# Patient Record
Sex: MALE | Race: WHITE | NOT HISPANIC OR LATINO | Employment: OTHER | URBAN - METROPOLITAN AREA
[De-identification: names, ages, dates, MRNs, and addresses within clinical notes are randomized per-mention and may not be internally consistent; named-entity substitution may affect disease eponyms.]

---

## 2017-01-02 ENCOUNTER — OFFICE VISIT (OUTPATIENT)
Dept: INTERNAL MEDICINE | Facility: CLINIC | Age: 67
End: 2017-01-02
Payer: COMMERCIAL

## 2017-01-02 VITALS
DIASTOLIC BLOOD PRESSURE: 66 MMHG | BODY MASS INDEX: 29.86 KG/M2 | OXYGEN SATURATION: 100 % | SYSTOLIC BLOOD PRESSURE: 118 MMHG | HEART RATE: 74 BPM | WEIGHT: 214 LBS | RESPIRATION RATE: 16 BRPM | TEMPERATURE: 97.3 F

## 2017-01-02 DIAGNOSIS — E78.5 HYPERLIPIDEMIA LDL GOAL <130: ICD-10-CM

## 2017-01-02 DIAGNOSIS — I67.89 ACUTE, BUT ILL-DEFINED, CEREBROVASCULAR DISEASE: Primary | ICD-10-CM

## 2017-01-02 PROCEDURE — 99214 OFFICE O/P EST MOD 30 MIN: CPT | Performed by: INTERNAL MEDICINE

## 2017-01-02 RX ORDER — ATORVASTATIN CALCIUM 40 MG/1
80 TABLET, FILM COATED ORAL DAILY
Qty: 180 TABLET | Refills: 3 | Status: SHIPPED | OUTPATIENT
Start: 2017-01-02 | End: 2018-03-23

## 2017-01-02 RX ORDER — ASPIRIN 325 MG
162 TABLET, DELAYED RELEASE (ENTERIC COATED) ORAL DAILY
COMMUNITY
End: 2018-03-23

## 2017-01-02 NOTE — NURSING NOTE
"Chief Complaint   Patient presents with     ER F/U       Initial /66 mmHg  Pulse 74  Temp(Src) 97.3  F (36.3  C) (Temporal)  Resp 16  Wt 214 lb (97.07 kg)  SpO2 100% Estimated body mass index is 29.86 kg/(m^2) as calculated from the following:    Height as of 12/28/16: 5' 11\" (1.803 m).    Weight as of this encounter: 214 lb (97.07 kg).  BP completed using cuff size: karolina Davis MA    "

## 2017-01-02 NOTE — MR AVS SNAPSHOT
After Visit Summary   1/2/2017    Dylon Barragan    MRN: [id number]           Patient Information     Date Of Birth          1950        Visit Information        Provider Department      1/2/2017 10:30 AM Orville Galeas MD [facility]        Today's Diagnoses     Acute, but ill-defined, cerebrovascular disease    -  1     Hyperlipidemia LDL goal <130            Follow-ups after your visit        Who to contact     If you have questions or need follow up information about today's clinic visit or your schedule please contact [facility] directly at [phone].  Normal or non-critical lab and imaging results will be communicated to you by T[employer]or Made Oilhart, letter or phone within 4 business days after the clinic has received the results. If you do not hear from us within 7 days, please contact the clinic through Totsyt or phone. If you have a critical or abnormal lab result, we will notify you by phone as soon as possible.  Submit refill requests through Unafinance or call your pharmacy and they will forward the refill request to us. Please allow 3 business days for your refill to be completed.          Additional Information About Your Visit        MyChart Information     Unafinance gives you secure access to your electronic health record. If you see a primary care provider, you can also send messages to your care team and make appointments. If you have questions, please call your primary care clinic.  If you do not have a primary care provider, please call [phone] and they will assist you.        Care EveryWhere ID     This is your Care EveryWhere ID. This could be used by other organizations to access your [place] medical records  [id number]        Your Vitals Were     Pulse Temperature Respirations Pulse Oximetry          74 97.3  F (36.3  C) (Temporal) 16 100%         Blood Pressure from Last 3 Encounters:   01/02/17 118/66   12/28/16 117/57   12/02/16 110/61     Weight from Last 3 Encounters:   01/02/17 214 lb (97.07 kg)   12/28/16 210 lb 1.6 oz (95.3 kg)   12/02/16 210 lb (95.255 kg)              Today, you had the following     No orders found for display         Today's Medication Changes          These changes are accurate as of: 1/2/17 11:59 PM.  If you have any questions, ask your nurse or doctor.               These medicines have changed or have updated prescriptions.        Dose/Directions    aspirin 325 MG EC tablet   This may have changed:  Another medication with the same name was removed. Continue taking this medication, and follow the directions you see here.   Changed by:  Orville Galeas MD        Dose:  325 mg   Take 325 mg by mouth daily   Refills:  0       atorvastatin 40 MG tablet   Commonly known as:  LIPITOR   This may have changed:    - how much to take  - how to take this  - when to take this   Used for:  Hyperlipidemia LDL goal <130   Changed by:  Orville Galeas MD        Dose:  80 mg   Take 2 tablets (80 mg) by mouth daily TAKE 1 TABLET BY MOUTH DAILY   Quantity:  180 tablet   Refills:  3            Where to get your medicines      These medications were sent to [address]   [address] [facility] [address]     Phone:  [phone]    - atorvastatin 40 MG tablet             Primary Care Provider Office Phone # Fax #    Orville Galeas [phone]       [address]   [facility] [address]        Thank you!     Thank you for choosing [facility]  for your care. Our goal is always to provide you with excellent care. Hearing back from our patients is one way we can continue to improve our services. Please take a few minutes to complete the written survey that you may receive in the mail after your visit with us. Thank you!             Your Updated Medication List - Protect others around you: Learn how to safely use, store and throw away  your medicines at www.disposemymeds.org.          This list is accurate as of: 1/2/17 11:59 PM.  Always use your most recent med list.                   Brand Name Dispense Instructions for use    aspirin 325 MG EC tablet      Take 325 mg by mouth daily       atorvastatin 40 MG tablet    LIPITOR    180 tablet    Take 2 tablets (80 mg) by mouth daily TAKE 1 TABLET BY MOUTH DAILY       clonazePAM 1 MG tablet    klonoPIN    30 tablet    Take 1 tablet (1 mg) by mouth daily       * COMPRESSION STOCKINGS     1 each    1 each daily.       fexofenadine 180 MG tablet    ALLEGRA    30 tablet    Take 1 tablet (180 mg) by mouth daily as needed       gabapentin 100 MG capsule    NEURONTIN     Take 100 mg by mouth daily       indapamide 1.25 MG tablet    LOZOL    90 tablet    Take 1 tablet (1.25 mg) by mouth every morning       ketoconazole 2 % cream    NIZORAL    60 g    APPLY TO AFFECTED AREA  on ear, face and handsBID       * other medical supplies      lotion for his hands       PARoxetine 12.5 MG 24 hr tablet    PAXIL-CR    90 tablet    TAKE ONE TABLET BY MOUTH EVERY MORNING       propranolol 20 MG tablet    INDERAL    180 tablet    Take 1 tablet (20 mg) by mouth 2 times daily       triamcinolone 0.5 % cream    KENALOG    45 g    Apply topically 2 times daily Apply 0.25 inches topically.       VENTOLIN  (90 BASE) MCG/ACT Inhaler   Generic drug:  albuterol     18 g    INHALE 2 PUFFS BY MOUTH EVERY 6 HOURS AS NEEDED FOR SHORTNESS OF BREATH / DYSPNEA       * Notice:  This list has 2 medication(s) that are the same as other medications prescribed for you. Read the directions carefully, and ask your doctor or other care provider to review them with you.

## 2017-01-02 NOTE — PROGRESS NOTES
SUBJECTIVE:                                                    Dylon Barragan is a 66 year old male who presents to clinic today for the following health issues:      ED/UC Followup:    Facility:  [facility]  Date of visit: 12/28/16  Reason for visit: TIA  Current Status: follow up     On the 28th, his wife notice a right side droop, tongue deviated as well.  Maybe some left eyelid hanging down.  Improved by the ER, maybe a few hours.      Elie lee had dizzy spell for a minute or so, then got better sitting in the car.  More balance issues, no syncope feelings. Then felt fine the next day.     Since the 28th has been more tired.      Patient is feeling better. No issues with speech, vision, facial droop.    Past Medical History   Diagnosis Date     Personal history of unspecified circulatory disease      Acute, but ill-defined, cerebrovascular disease 7/99     right brainstem with right hemiparesis and dysphagia left eye     Essential hypertension, benign      Mixed hyperlipidemia      Edema      Ventral hernia, unspecified, without mention of obstruction or gangrene      Other psoriasis      Sleep apnea      CPAP     Unspecified cerebral artery occlusion with cerebral infarction      1999     Dysphagia      Complication of anesthesia      half an airway due to stroke hx     Current Outpatient Prescriptions   Medication     aspirin 325 MG EC tablet     atorvastatin (LIPITOR) 40 MG tablet     fexofenadine (ALLEGRA) 180 MG tablet     PARoxetine (PAXIL-CR) 12.5 MG 24 hr tablet     propranolol (INDERAL) 20 MG tablet     indapamide (LOZOL) 1.25 MG tablet     triamcinolone (KENALOG) 0.5 % cream     clonazePAM (KLONOPIN) 1 MG tablet     VENTOLIN  (90 BASE) MCG/ACT inhaler     gabapentin (NEURONTIN) 100 MG capsule     ketoconazole (NIZORAL) 2 % cream     COMPRESSION STOCKINGS     OTHER MEDICAL SUPPLIES     No current facility-administered medications for this visit.       Review of  Systems  Constitutional-No fevers, chills, or weight changes..  Eyes-No blurry or double vision.  ENT-No earpain, sore throat, voice changes or rhinitis.  Cardiac-No chest pain or palpitations.  Respiratory-No cough, sob, or hemoptysis.  GI-No nausea, vomitting, diarrhea, constipation, or blood in the stool.    Physical Exam  /66 mmHg  Pulse 74  Temp(Src) 97.3  F (36.3  C) (Temporal)  Resp 16  Wt 214 lb (97.07 kg)  SpO2 100%  General Appearance-healthy, alert, no distress  Eyes-conjuctiva clear, PERRL, EOM intact  ENT-ENT exam normal, no neck nodes or sinus tenderness  Cardiac-regular rate and rhythm  with normal S1, S2 ; no murmur, rub or gallops  Lungs-clear to auscultation    ASSESSMENT:  Patient has had a stroke back in 2006. He now had a TIA. He was having some right-sided issues with his cheek and tongue. He was evaluated in the emergency room and slightly elevated blood pressure, negative head CT, negative MRI other than his old lesion from 2006 and a clear MRA of his head and neck. His symptoms resolved within 2 hours. He has felt tired since then. His blood pressure is well controlled. He is on atorvastatin but will increase this from 40-80 mg. He has said his aspirin increased from . He'll follow up as regularly scheduled with his neurologist. He is aware that his increased risk for the next 1-2 months to have another TIA or stroke and therefore will have increased aspirin and statin therapy he will avoid indulging on high cholesterol foods and stay near medical facilities.    Electronically signed by Orville Galeas MD

## 2017-02-03 ENCOUNTER — TRANSFERRED RECORDS (OUTPATIENT)
Dept: HEALTH INFORMATION MANAGEMENT | Facility: CLINIC | Age: 67
End: 2017-02-03

## 2017-08-23 ENCOUNTER — TELEPHONE (OUTPATIENT)
Dept: INTERNAL MEDICINE | Facility: CLINIC | Age: 67
End: 2017-08-23

## 2017-08-23 NOTE — TELEPHONE ENCOUNTER
Do you have any recommendations or should pt contact the travel clinic? Ani Morgan CMA ([facility])

## 2017-08-23 NOTE — TELEPHONE ENCOUNTER
I would go by the tourist information.  Malaria should not be an issue unless they list it. Typhoid and hepatitis A are options for vaccines but they are food and water contamination so he should be able to avoid that risk with avoid local foods, tap water, etc.  He can get the typhoid and hep A vaccines if he wants.

## 2017-08-23 NOTE — TELEPHONE ENCOUNTER
If he is not going to wilderness areas and just doing the cruise line tourist areas he should be fine. Avoid street food vendors.      He can research the areas on the CDC website if he wants but should not need travel clinic.

## 2017-08-23 NOTE — TELEPHONE ENCOUNTER
Relayed provider's message.  Pt states he is going to think about it, and will get back to us.    Vern Greene RN, BSN

## 2017-08-23 NOTE — TELEPHONE ENCOUNTER
Telephone call to pt, relayed provider's message.  Pt states that [place].  Advised pt that RN can double check with Dr. Galeas and will only call back if additional recommendations.  Pt verbalizes understanding.      Vern Greene RN, BSN

## 2017-08-23 NOTE — TELEPHONE ENCOUNTER
Reason for Call:  Other question    Detailed comments: patient is going on a cruise from [place], stopping in [place], [place], [place] and a few more, patient would like to know if there are shots he needs to get or any meds he should be bring, patient is wondering if he needs to go to the travel clinic, as patient states that the cruise line stated that they didn't needs any shots, please call and advise, also if they need to go to the travel clinic where would the closest one be.    Phone Number Patient can be reached at: Home number on file [phone] (home)    Best Time: any     Can we leave a detailed message on this number? YES    Call taken on 8/23/2017 at 8:36 AM by Cecilia West

## 2017-11-09 DIAGNOSIS — I10 ESSENTIAL HYPERTENSION WITH GOAL BLOOD PRESSURE LESS THAN 130/80: ICD-10-CM

## 2017-11-09 DIAGNOSIS — F43.21 ADJUSTMENT DISORDER WITH DEPRESSED MOOD: ICD-10-CM

## 2017-11-14 RX ORDER — INDAPAMIDE 1.25 MG/1
TABLET ORAL
Qty: 90 TABLET | Refills: 0 | Status: SHIPPED | OUTPATIENT
Start: 2017-11-14 | End: 2018-03-12

## 2017-11-14 RX ORDER — PAROXETINE HYDROCHLORIDE HEMIHYDRATE 12.5 MG/1
TABLET, FILM COATED, EXTENDED RELEASE ORAL
Qty: 90 TABLET | Refills: 2 | Status: SHIPPED | OUTPATIENT
Start: 2017-11-14 | End: 2018-03-23

## 2017-11-14 NOTE — TELEPHONE ENCOUNTER
Requested Prescriptions   Pending Prescriptions Disp Refills     PARoxetine (PAXIL-CR) 12.5 MG 24 hr tablet [Pharmacy Med Name: PAROXETINE HCL ER 12.5MG TB24] 90 tablet 2     Sig: TAKE ONE TABLET BY MOUTH EVERY MORNING    SSRIs Protocol Failed    11/9/2017  9:38 AM       Failed - PHQ-9 score less than 5 in past 6 months    Please review PHQ-9 score.          Failed - Recent (6 mo) or future visit with authorizing provider's specialty    Patient had office visit in the last 6 months or has a visit in the next 30 days with authorizing provider.  See chart review.            Passed - Medication is NOT Bupropion    If the medication is Bupropion (Wellbutrin), and the patient is taking for smoking cessation; OK to refill.         Passed - Patient is age 18 or older        indapamide (LOZOL) 1.25 MG tablet [Pharmacy Med Name: INDAPAMIDE 1.25MG TABS] 90 tablet 0     Sig: TAKE ONE TABLET BY MOUTH EVERY MORNING    Diuretics (Including Combos) Protocol Passed    11/9/2017  9:38 AM       Passed - Blood pressure under 140/90    BP Readings from Last 3 Encounters:   01/02/17 118/66   12/28/16 117/57   12/02/16 110/61                Passed - Recent or future visit with authorizing provider's specialty    Patient had office visit in the last year or has a visit in the next 30 days with authorizing provider.  See chart review.              Passed - Patient is age 18 or older       Passed - Normal serum creatinine on file in past 12 months    Recent Labs   Lab Test  12/28/16   0945   CR  0.79             Passed - Normal serum potassium on file in past 12 months    Recent Labs   Lab Test  12/28/16   0945   POTASSIUM  3.7                   Passed - Normal serum sodium on file in past 12 months    Recent Labs   Lab Test  12/28/16   0945   NA  142              PHQ-9 score:    PHQ-9 SCORE 3/12/2015   Total Score 4       Routing refill request to provider for review/approval because:  Patient needs to be seen because it has been more than  1 year since last office visit.  PHQ 9 completed on 03/12/2015.    Angélica Go RN

## 2017-11-15 ENCOUNTER — TELEPHONE (OUTPATIENT)
Dept: FAMILY MEDICINE | Facility: OTHER | Age: 67
End: 2017-11-15

## 2017-11-15 NOTE — TELEPHONE ENCOUNTER
Patient came to ask Dr. Herrera if she could look at a rupture underneath his eye, she directed him to an RN or FP, Manuel Yadav came to take a look at it and patient stated he had no pain, the bruise had gotten bigger since it was noticed by his wife at 5:15pm and he is unaware of any injury to the site. Patient did state he previously had a stroke and was being treated for high blood pressure so Manuel recommended that he be seen at the urgent care due to his history. Sonali Melendez, [facility]

## 2017-12-01 ENCOUNTER — MYC MEDICAL ADVICE (OUTPATIENT)
Dept: INTERNAL MEDICINE | Facility: CLINIC | Age: 67
End: 2017-12-01

## 2017-12-01 DIAGNOSIS — G81.90 HEMIPARESIS (H): ICD-10-CM

## 2017-12-01 DIAGNOSIS — Z86.73 HISTORY OF STROKE: Primary | ICD-10-CM

## 2017-12-04 ENCOUNTER — TELEPHONE (OUTPATIENT)
Dept: FAMILY MEDICINE | Facility: CLINIC | Age: 67
End: 2017-12-04

## 2017-12-04 DIAGNOSIS — Z71.84 TRAVEL ADVICE ENCOUNTER: Primary | ICD-10-CM

## 2017-12-04 RX ORDER — AZITHROMYCIN 250 MG/1
TABLET, FILM COATED ORAL
Qty: 6 TABLET | Refills: 0 | Status: SHIPPED | OUTPATIENT
Start: 2017-12-04 | End: 2018-03-23

## 2017-12-06 DIAGNOSIS — T75.3XXS: Primary | ICD-10-CM

## 2017-12-06 RX ORDER — SCOLOPAMINE TRANSDERMAL SYSTEM 1 MG/1
PATCH, EXTENDED RELEASE TRANSDERMAL
Qty: 10 PATCH | Refills: 0 | Status: SHIPPED | OUTPATIENT
Start: 2017-12-06 | End: 2018-03-23

## 2017-12-06 NOTE — TELEPHONE ENCOUNTER
Patient is requesting a refill on his Transderm Scop patches.  It is dc'd on his med list.  Last filled 12/7/2015.    Patient is going on a cruise with his wife and is requesting 10 patches.     -Michelle Garcia, Pharm.D., [facility], [phone]

## 2017-12-07 ENCOUNTER — TELEPHONE (OUTPATIENT)
Dept: INTERNAL MEDICINE | Facility: CLINIC | Age: 67
End: 2017-12-07

## 2017-12-07 DIAGNOSIS — J45.909 ASTHMA: Primary | ICD-10-CM

## 2017-12-07 NOTE — TELEPHONE ENCOUNTER
Pt was advised and verbalized understanding. Pt is now questioning if Dr. Galeas has an idea of what bug spray he should use while out of the country.

## 2017-12-07 NOTE — TELEPHONE ENCOUNTER
Yes those should be good. Typhoid is good for 5 years, pneumonia are good for life.  Make sure he got hepatitis A completely.

## 2017-12-07 NOTE — TELEPHONE ENCOUNTER
Reason for Call:  Other  - Dylon is aware that Dr Galeas is not in today    Detailed comments: Dylon is wondering about his travel immunziations he had 2yrs ago and if they are still active for his out of the country vacation this Jan.    Phone Number Patient can be reached at: Home number on file [phone] (home)    Best Time: any    Can we leave a detailed message on this number? YES    Call taken on 12/7/2017 at 10:34 AM by Katarina Rossi

## 2017-12-07 NOTE — TELEPHONE ENCOUNTER
Going on a cruise and needs for that  Roxy Chow, Pharmacy [facility] [place] [phone]

## 2017-12-11 ENCOUNTER — ALLIED HEALTH/NURSE VISIT (OUTPATIENT)
Dept: FAMILY MEDICINE | Facility: OTHER | Age: 67
End: 2017-12-11
Payer: COMMERCIAL

## 2017-12-11 DIAGNOSIS — Z23 NEED FOR VACCINATION: Primary | ICD-10-CM

## 2017-12-11 PROCEDURE — 90471 IMMUNIZATION ADMIN: CPT

## 2017-12-11 PROCEDURE — 90632 HEPA VACCINE ADULT IM: CPT

## 2017-12-11 PROCEDURE — 99207 ZZC NO CHARGE LOS: CPT

## 2017-12-11 NOTE — MR AVS SNAPSHOT
After Visit Summary   12/11/2017    Dylon Barragan    MRN: [id number]           Patient Information     Date Of Birth          1950        Visit Information        Provider Department      12/11/2017 8:45 AM CIELO LEWIS TEAM B, [facility]        Today's Diagnoses     Need for vaccination    -  1       Follow-ups after your visit        Who to contact     If you have questions or need follow up information about today's clinic visit or your schedule please contact [facility] directly at [phone].  Normal or non-critical lab and imaging results will be communicated to you by FAGUOhart, letter or phone within 4 business days after the clinic has received the results. If you do not hear from us within 7 days, please contact the clinic through Ampriust or phone. If you have a critical or abnormal lab result, we will notify you by phone as soon as possible.  Submit refill requests through HubHub or call your pharmacy and they will forward the refill request to us. Please allow 3 business days for your refill to be completed.          Additional Information About Your Visit        MyChart Information     HubHub gives you secure access to your electronic health record. If you see a primary care provider, you can also send messages to your care team and make appointments. If you have questions, please call your primary care clinic.  If you do not have a primary care provider, please call [phone] and they will assist you.        Care EveryWhere ID     This is your Care EveryWhere ID. This could be used by other organizations to access your [place] medical records  [id number]         Blood Pressure from Last 3 Encounters:   01/02/17 118/66   12/28/16 117/57   12/02/16 110/61    Weight from Last 3 Encounters:   01/02/17 214 lb (97.1 kg)   12/28/16 210 lb 1.6 oz (95.3 kg)   12/02/16 210 lb (95.3 kg)              We Performed the Following     1st  Administration   [[id number]]     HEPATITIS A VACCINE, ADULT  [[id number]]        Primary Care Provider Office Phone # Fax #    Orville Galeas [phone]       [facility] [address] [place] [address]        Equal Access to Services     GLADIS NITA : Hadii aad ku hadasho Soomaali, waaxda luqadaha, qaybta kaalmada adeegyada, waxay idiin hayaan adeeg khgarrett lastuartn ah. So [facility] [phone].    ATENCIÓN: Si habla español, tiene a jackson disposición servicios gratuitos de asistencia lingüística. Llame al [phone].    We comply with applicable federal civil rights laws and [place] laws. We do not discriminate on the basis of race, color, national origin, age, disability, sex, sexual orientation, or gender identity.            Thank you!     Thank you for choosing [facility]  for your care. Our goal is always to provide you with excellent care. Hearing back from our patients is one way we can continue to improve our services. Please take a few minutes to complete the written survey that you may receive in the mail after your visit with us. Thank you!             Your Updated Medication List - Protect others around you: Learn how to safely use, store and throw away your medicines at www.disposemymeds.org.          This list is accurate as of: 12/11/17  8:45 AM.  Always use your most recent med list.                   Brand Name Dispense Instructions for use Diagnosis    aspirin 325 MG EC tablet      Take 325 mg by mouth daily        atorvastatin 40 MG tablet    LIPITOR    180 tablet    Take 2 tablets (80 mg) by mouth daily TAKE 1 TABLET BY MOUTH DAILY    Hyperlipidemia LDL goal <130       azithromycin 250 MG tablet    ZITHROMAX    6 tablet    Two tablets first day, then one tablet daily for four days.    Travel advice encounter       clonazePAM 1 MG tablet    klonoPIN    30 tablet    Take 1 tablet (1 mg) by mouth daily    Tremor       * COMPRESSION STOCKINGS     1 each    1 each daily.     Edema, Ankle pain       fexofenadine 180 MG tablet    ALLEGRA    30 tablet    Take 1 tablet (180 mg) by mouth daily as needed    Chronic rhinitis       gabapentin 100 MG capsule    NEURONTIN     Take 100 mg by mouth daily        indapamide 1.25 MG tablet    LOZOL    90 tablet    TAKE ONE TABLET BY MOUTH EVERY MORNING    Essential hypertension with goal blood pressure less than 130/80       ketoconazole 2 % cream    NIZORAL    60 g    APPLY TO AFFECTED AREA  on ear, face and handsBID    Dermatitis       * other medical supplies      lotion for his hands        PARoxetine 12.5 MG 24 hr tablet    PAXIL-CR    90 tablet    TAKE ONE TABLET BY MOUTH EVERY MORNING    Adjustment disorder with depressed mood       propranolol 20 MG tablet    INDERAL    180 tablet    Take 1 tablet (20 mg) by mouth 2 times daily    Tremor       scopolamine 72 hr patch    TRANSDERM    10 patch    Apply 1 patch to hairless area behind one ear at least 4 hours before travel.  Remove old patch and change every 3 days (72 hours).    Seasickness, sequela       triamcinolone 0.5 % cream    KENALOG    45 g    Apply topically 2 times daily Apply 0.25 inches topically.    Rash       VENTOLIN  (90 BASE) MCG/ACT Inhaler   Generic drug:  albuterol     18 g    INHALE 2 PUFFS BY MOUTH EVERY 6 HOURS AS NEEDED FOR SHORTNESS OF BREATH / DYSPNEA    Unspecified asthma(493.90)       * Notice:  This list has 2 medication(s) that are the same as other medications prescribed for you. Read the directions carefully, and ask your doctor or other care provider to review them with you.

## 2017-12-11 NOTE — NURSING NOTE
Screening Questionnaire for Adult Immunization    Are you sick today?   No   Do you have allergies to medications, food, a vaccine component or latex?   No   Have you ever had a serious reaction after receiving a vaccination?   No   Do you have a long-term health problem with heart disease, lung disease, asthma, kidney disease, metabolic disease (e.g. diabetes), anemia, or other blood disorder?   No   Do you have cancer, leukemia, HIV/AIDS, or any other immune system problem?   No   In the past 3 months, have you taken medications that affect  your immune system, such as prednisone, other steroids, or anticancer drugs; drugs for the treatment of rheumatoid arthritis, Crohn s disease, or psoriasis; or have you had radiation treatments?   No   Have you had a seizure, or a brain or other nervous system problem?   No   During the past year, have you received a transfusion of blood or blood     products, or been given immune (gamma) globulin or antiviral drug?   No   For women: Are you pregnant or is there a chance you could become        pregnant during the next month?   No   Have you received any vaccinations in the past 4 weeks?   No     Immunization questionnaire answers were all negative.      Patient instructed to remain in clinic for 15 minutes afterwards, and to report any adverse reaction to me immediately. Prior to injection verified patient identity using patient's name and date of birth.     Screening performed by Katheryn Downing on 12/11/2017 at 8:42 AM.

## 2017-12-12 RX ORDER — ALBUTEROL SULFATE 90 UG/1
AEROSOL, METERED RESPIRATORY (INHALATION)
Qty: 18 G | Refills: 1 | Status: SHIPPED | OUTPATIENT
Start: 2017-12-12 | End: 2019-07-08

## 2018-03-12 DIAGNOSIS — I10 ESSENTIAL HYPERTENSION WITH GOAL BLOOD PRESSURE LESS THAN 130/80: ICD-10-CM

## 2018-03-12 NOTE — TELEPHONE ENCOUNTER
"Requested Prescriptions   Pending Prescriptions Disp Refills     indapamide (LOZOL) 1.25 MG tablet [Pharmacy Med Name: INDAPAMIDE 1.25MG TABS] 90 tablet 0     Sig: TAKE ONE TABLET BY MOUTH EVERY MORNING    Diuretics (Including Combos) Protocol Failed    3/12/2018  8:32 AM       Failed - Blood pressure under 140/90 in past 12 months    BP Readings from Last 3 Encounters:   01/02/17 118/66   12/28/16 117/57   12/02/16 110/61                Failed - Recent (12 mo) or future (30 days) visit within the authorizing provider's specialty    Patient had office visit in the last 12 months or has a visit in the next 30 days with authorizing provider or within the authorizing provider's specialty.  See \"Patient Info\" tab in inbasket, or \"Choose Columns\" in Meds & Orders section of the refill encounter.           Failed - Normal serum creatinine on file in past 12 months    Recent Labs   Lab Test  12/28/16   0945   CR  0.79             Failed - Normal serum potassium on file in past 12 months    Recent Labs   Lab Test  12/28/16   0945   POTASSIUM  3.7                   Failed - Normal serum sodium on file in past 12 months    Recent Labs   Lab Test  12/28/16   0945   NA  142             Passed - Patient is age 18 or older          Last Written Prescription Date:  11/14/17  Last Fill Quantity: 90,  # refills: 0   Last Office Visit with [facility], P or [facility] prescribing provider:  1/02/17   Future Office Visit:       "

## 2018-03-13 RX ORDER — INDAPAMIDE 1.25 MG/1
1.25 TABLET ORAL EVERY MORNING
Qty: 90 TABLET | Refills: 0 | Status: SHIPPED | OUTPATIENT
Start: 2018-03-13 | End: 2018-03-23

## 2018-03-15 ENCOUNTER — TRANSFERRED RECORDS (OUTPATIENT)
Dept: HEALTH INFORMATION MANAGEMENT | Facility: CLINIC | Age: 68
End: 2018-03-15

## 2018-03-23 ENCOUNTER — OFFICE VISIT (OUTPATIENT)
Dept: INTERNAL MEDICINE | Facility: CLINIC | Age: 68
End: 2018-03-23
Payer: COMMERCIAL

## 2018-03-23 ENCOUNTER — OFFICE VISIT (OUTPATIENT)
Dept: SLEEP MEDICINE | Facility: CLINIC | Age: 68
End: 2018-03-23
Payer: COMMERCIAL

## 2018-03-23 VITALS
HEIGHT: 71 IN | BODY MASS INDEX: 29.54 KG/M2 | HEART RATE: 74 BPM | SYSTOLIC BLOOD PRESSURE: 124 MMHG | OXYGEN SATURATION: 98 % | RESPIRATION RATE: 16 BRPM | DIASTOLIC BLOOD PRESSURE: 86 MMHG | WEIGHT: 211 LBS

## 2018-03-23 VITALS
HEIGHT: 71 IN | BODY MASS INDEX: 29.54 KG/M2 | HEART RATE: 74 BPM | TEMPERATURE: 96.9 F | WEIGHT: 211 LBS | OXYGEN SATURATION: 98 % | DIASTOLIC BLOOD PRESSURE: 86 MMHG | SYSTOLIC BLOOD PRESSURE: 124 MMHG | RESPIRATION RATE: 16 BRPM

## 2018-03-23 DIAGNOSIS — G47.33 OSA (OBSTRUCTIVE SLEEP APNEA): Primary | ICD-10-CM

## 2018-03-23 DIAGNOSIS — E78.5 HYPERLIPIDEMIA LDL GOAL <130: ICD-10-CM

## 2018-03-23 DIAGNOSIS — F43.21 ADJUSTMENT DISORDER WITH DEPRESSED MOOD: ICD-10-CM

## 2018-03-23 DIAGNOSIS — I10 ESSENTIAL HYPERTENSION WITH GOAL BLOOD PRESSURE LESS THAN 130/80: Primary | ICD-10-CM

## 2018-03-23 LAB
ALBUMIN SERPL-MCNC: 3.8 G/DL (ref 3.4–5)
ALP SERPL-CCNC: 99 U/L (ref 40–150)
ALT SERPL W P-5'-P-CCNC: 39 U/L (ref 0–70)
ANION GAP SERPL CALCULATED.3IONS-SCNC: 9 MMOL/L (ref 3–14)
AST SERPL W P-5'-P-CCNC: 28 U/L (ref 0–45)
BILIRUB SERPL-MCNC: 0.9 MG/DL (ref 0.2–1.3)
BUN SERPL-MCNC: 14 MG/DL (ref 7–30)
CALCIUM SERPL-MCNC: 8.6 MG/DL (ref 8.5–10.1)
CHLORIDE SERPL-SCNC: 101 MMOL/L (ref 94–109)
CHOLEST SERPL-MCNC: 135 MG/DL
CO2 SERPL-SCNC: 32 MMOL/L (ref 20–32)
CREAT SERPL-MCNC: 0.66 MG/DL (ref 0.66–1.25)
CREAT UR-MCNC: 233 MG/DL
GFR SERPL CREATININE-BSD FRML MDRD: >90 ML/MIN/1.7M2
GLUCOSE SERPL-MCNC: 103 MG/DL (ref 70–99)
HDLC SERPL-MCNC: 45 MG/DL
LDLC SERPL CALC-MCNC: 68 MG/DL
MICROALBUMIN UR-MCNC: 29 MG/L
MICROALBUMIN/CREAT UR: 12.45 MG/G CR (ref 0–17)
NONHDLC SERPL-MCNC: 90 MG/DL
POTASSIUM SERPL-SCNC: 3.6 MMOL/L (ref 3.4–5.3)
PROT SERPL-MCNC: 7.7 G/DL (ref 6.8–8.8)
SODIUM SERPL-SCNC: 142 MMOL/L (ref 133–144)
TRIGL SERPL-MCNC: 108 MG/DL

## 2018-03-23 PROCEDURE — 99213 OFFICE O/P EST LOW 20 MIN: CPT | Performed by: PHYSICIAN ASSISTANT

## 2018-03-23 PROCEDURE — 99214 OFFICE O/P EST MOD 30 MIN: CPT | Performed by: INTERNAL MEDICINE

## 2018-03-23 PROCEDURE — 82043 UR ALBUMIN QUANTITATIVE: CPT | Performed by: INTERNAL MEDICINE

## 2018-03-23 PROCEDURE — 36415 COLL VENOUS BLD VENIPUNCTURE: CPT | Performed by: INTERNAL MEDICINE

## 2018-03-23 PROCEDURE — 80053 COMPREHEN METABOLIC PANEL: CPT | Performed by: INTERNAL MEDICINE

## 2018-03-23 PROCEDURE — 80061 LIPID PANEL: CPT | Performed by: INTERNAL MEDICINE

## 2018-03-23 RX ORDER — BACLOFEN 20 MG/1
20 TABLET ORAL 3 TIMES DAILY
COMMUNITY
End: 2022-03-16

## 2018-03-23 RX ORDER — PAROXETINE HYDROCHLORIDE HEMIHYDRATE 12.5 MG/1
12.5 TABLET, FILM COATED, EXTENDED RELEASE ORAL EVERY MORNING
Qty: 90 TABLET | Refills: 3 | Status: SHIPPED | OUTPATIENT
Start: 2018-03-23 | End: 2018-12-05

## 2018-03-23 RX ORDER — PROPRANOLOL HYDROCHLORIDE 60 MG/1
1 TABLET ORAL 2 TIMES DAILY
COMMUNITY
End: 2019-07-15 | Stop reason: DRUGHIGH

## 2018-03-23 RX ORDER — INDAPAMIDE 1.25 MG/1
1.25 TABLET ORAL EVERY MORNING
Qty: 90 TABLET | Refills: 3 | Status: SHIPPED | OUTPATIENT
Start: 2018-03-23 | End: 2019-06-03

## 2018-03-23 RX ORDER — ATORVASTATIN CALCIUM 40 MG/1
80 TABLET, FILM COATED ORAL DAILY
Qty: 180 TABLET | Refills: 3 | Status: SHIPPED | OUTPATIENT
Start: 2018-03-23 | End: 2018-05-14

## 2018-03-23 ASSESSMENT — PAIN SCALES - GENERAL: PAINLEVEL: NO PAIN (0)

## 2018-03-23 NOTE — MR AVS SNAPSHOT
After Visit Summary   3/23/2018    Dyoln Barragan    MRN: [id number]           Patient Information     Date Of Birth          1950        Visit Information        Provider Department      3/23/2018 9:00 AM Orville Galeas MD [facility]         Follow-ups after your visit        Your next 10 appointments already scheduled     Mar 23, 2018 10:30 AM CDT   Return Sleep Patient with Silvano Chambers PA-C   [facility] ([facility])    [address] [place] [id number] [phone]              Who to contact     If you have questions or need follow up information about today's clinic visit or your schedule please contact [facility] directly at [phone].  Normal or non-critical lab and imaging results will be communicated to you by MyChart, letter or phone within 4 business days after the clinic has received the results. If you do not hear from us within 7 days, please contact the clinic through MyChart or phone. If you have a critical or abnormal lab result, we will notify you by phone as soon as possible.  Submit refill requests through [employer] or call your pharmacy and they will forward the refill request to us. Please allow 3 business days for your refill to be completed.          Additional Information About Your Visit        MyChart Information     [employer] gives you secure access to your electronic health record. If you see a primary care provider, you can also send messages to your care team and make appointments. If you have questions, please call your primary care clinic.  If you do not have a primary care provider, please call [phone] and they will assist you.        Care EveryWhere ID     This is your Care EveryWhere ID. This could be used by other organizations to access your [place] medical records  [id number]        Your Vitals Were     Pulse Temperature Respirations Height Pulse  "Oximetry BMI (Body Mass Index)    74 96.9  F (36.1  C) (Temporal) 16 5' 10.75\" (1.797 m) 98% 29.64 kg/m2       Blood Pressure from Last 3 Encounters:   03/23/18 98/64   01/02/17 118/66   12/28/16 117/57    Weight from Last 3 Encounters:   03/23/18 211 lb (95.7 kg)   01/02/17 214 lb (97.1 kg)   12/28/16 210 lb 1.6 oz (95.3 kg)              Today, you had the following     No orders found for display         Today's Medication Changes          These changes are accurate as of 3/23/18  9:10 AM.  If you have any questions, ask your nurse or doctor.               These medicines have changed or have updated prescriptions.        Dose/Directions    propranolol HCl 60 MG Tabs   This may have changed:  Another medication with the same name was removed. Continue taking this medication, and follow the directions you see here.   Changed by:  Orville Galeas MD        Dose:  1 tablet   Take 1 tablet by mouth 2 times daily   Refills:  0                Primary Care Provider Office Phone # Fax #    Orville Galeas [phone]       [facility] [address] [place] [address]        Equal Access to Services     HUMBLE MOYA AH: Hadii rodrigo ku hadasho Soomaali, waaxda luqadaha, qaybta kaalmada adeegyada, waxay osbaldoin haykenzien stephany griggs. So [facility] [phone].    ATENCIÓN: Si habla español, tiene a jackson disposición servicios gratuitos de asistencia lingüística. Llame al [phone].    We comply with applicable federal civil rights laws and [place] laws. We do not discriminate on the basis of race, color, national origin, age, disability, sex, sexual orientation, or gender identity.            Thank you!     Thank you for choosing [facility]  for your care. Our goal is always to provide you with excellent care. Hearing back from our patients is one way we can continue to improve our services. Please take a few minutes to complete the written survey that you may receive in the " mail after your visit with us. Thank you!             Your Updated Medication List - Protect others around you: Learn how to safely use, store and throw away your medicines at www.disposemymeds.org.          This list is accurate as of 3/23/18  9:10 AM.  Always use your most recent med list.                   Brand Name Dispense Instructions for use Diagnosis    albuterol 108 (90 BASE) MCG/ACT Inhaler    VENTOLIN HFA    18 g    INHALE 2 PUFFS BY MOUTH EVERY 6 HOURS AS NEEDED FOR SHORTNESS OF BREATH / DYSPNEA    Asthma       aspirin 325 MG EC tablet      Take 325 mg by mouth daily        atorvastatin 40 MG tablet    LIPITOR    180 tablet    Take 2 tablets (80 mg) by mouth daily TAKE 1 TABLET BY MOUTH DAILY    Hyperlipidemia LDL goal <130       baclofen 20 MG tablet    LIORESAL     Take 20 mg by mouth 2 times daily        clonazePAM 1 MG tablet    klonoPIN    30 tablet    Take 1 tablet (1 mg) by mouth daily    Tremor       * COMPRESSION STOCKINGS     1 each    1 each daily.    Edema, Ankle pain       fexofenadine 180 MG tablet    ALLEGRA    30 tablet    Take 1 tablet (180 mg) by mouth daily as needed    Chronic rhinitis       gabapentin 100 MG capsule    NEURONTIN     Take 100 mg by mouth daily        indapamide 1.25 MG tablet    LOZOL    90 tablet    Take 1 tablet (1.25 mg) by mouth every morning Due for office visit    Essential hypertension with goal blood pressure less than 130/80       ketoconazole 2 % cream    NIZORAL    60 g    APPLY TO AFFECTED AREA  on ear, face and handsBID    Dermatitis       * other medical supplies      lotion for his hands        PARoxetine 12.5 MG 24 hr tablet    PAXIL-CR    90 tablet    TAKE ONE TABLET BY MOUTH EVERY MORNING    Adjustment disorder with depressed mood       propranolol HCl 60 MG Tabs      Take 1 tablet by mouth 2 times daily        triamcinolone 0.5 % cream    KENALOG    45 g    Apply topically 2 times daily Apply 0.25 inches topically.    Rash       * Notice:  This  list has 2 medication(s) that are the same as other medications prescribed for you. Read the directions carefully, and ask your doctor or other care provider to review them with you.

## 2018-03-23 NOTE — NURSING NOTE
"Chief Complaint   Patient presents with     CPAP Follow Up     Need DME supplies, last seen a provider on 12/15/15       Initial /86  Pulse 74  Resp 16  Ht 1.797 m (5' 10.75\")  Wt 95.7 kg (211 lb)  SpO2 98%  BMI 29.64 kg/m2 Estimated body mass index is 29.64 kg/(m^2) as calculated from the following:    Height as of this encounter: 1.797 m (5' 10.75\").    Weight as of this encounter: 95.7 kg (211 lb).  Medication Reconciliation: complete     [place]=10    April Campos CMA       "

## 2018-03-23 NOTE — PROGRESS NOTES
"Obstructive Sleep Apnea- PAP Follow-Up Visit:    Chief Complaint   Patient presents with     CPAP Follow Up     Need DME supplies, last seen a provider on 12/15/15       Dylon Barragan comes in today for follow-up of their severe sleep apnea, managed with CPAP.     No specialty comments available.    Overall, he rates the experience with PAP as 6-15 (0 poor, 10 great). The mask is comfortable.  The mask is uncomfortable because of \"he needs a new one\".  The mask is not leaking .  He is not snoring with the mask on. He is not having gasp arousals.  He is not having significant oral/nasal dryness. The pressure is comfortable.    His PAP interface is Nasal Mask.    Bedtime is typically 11:30. Usually it takes about 10 minutes to fall asleep with the mask on. Wake time is typically 0630.  Patient is using PAP therapy 4-5  hours per night. The patient is usually getting 6-7 hours of sleep per night.    He does not feel rested in the morning.      ResMed   Auto-PAP 6 - 15 cmH2O 30 day usage data:    73% of days with > 4 hours of use. 1/30 days with no use. Average use 4 hours 46 minutes per day.   95%ile Leak 1.9 L/min.   CPAP 95% pressure 10.4 cm.   AHI 6.8 events per hour.       Past medical/surgical history, family history, social history, medications and allergies were reviewed.      Problem List:  Patient Active Problem List    Diagnosis Date Noted     Essential hypertension with goal blood pressure less than 130/80 08/30/2016     Priority: Medium     Adjustment disorder with depressed mood 12/22/2015     Priority: Medium     Acute bronchitis 07/09/2015     Priority: Medium     TED (obstructive sleep apnea) 06/25/2013     Priority: Medium     Advanced directives, counseling/discussion 01/23/2012     Priority: Medium     Advance Directive Problem List Overview:   Name Relationship Phone    Primary Health Care Agent            Alternative Health Care Agent          Discussed advance care planning with patient; " "however, patient declined at this time. 1/23/2012          HYPERLIPIDEMIA LDL GOAL <130 10/31/2010     Priority: Medium     GERD (gastroesophageal reflux disease) 09/25/2008     Priority: Medium     Acute, but ill-defined, cerebrovascular disease      Priority: Medium     right brainstem          /86  Pulse 74  Resp 16  Ht 1.797 m (5' 10.75\")  Wt 95.7 kg (211 lb)  SpO2 98%  BMI 29.64 kg/m2    [place]= 10      Impression/Plan:     Severe Sleep apnea. He is Tolerating PAP well. Daytime symptoms are improved, still falls asleep easily.   Overall improved from 2015, apnea index shows 3.7 central/1.9 obstructive events. Will not change pressures at this time. He will talk with DME regarding new supplies.     Dylon Barragan will follow up in about 1 year(s).     Twenty-five minutes spent with patient, all of which were spent face-to-face counseling, consulting, coordinating plan of care.            CC:  Orville Galeas,     "

## 2018-03-23 NOTE — MR AVS SNAPSHOT
"              After Visit Summary   3/23/2018    Dylon Barragan    MRN: [id number]           Patient Information     Date Of Birth          1950        Visit Information        Provider Department      3/23/2018 10:30 AM Silvano Chambers PA-C [facility]        Today's Diagnoses     TED (obstructive sleep apnea)    -  1       Follow-ups after your visit        Follow-up notes from your care team     Return in about 1 year (around 3/23/2019).      Who to contact     If you have questions or need follow up information about today's clinic visit or your schedule please contact [facility] directly at [phone].  Normal or non-critical lab and imaging results will be communicated to you by MyChart, letter or phone within 4 business days after the clinic has received the results. If you do not hear from us within 7 days, please contact the clinic through [employer]Buz[employer] or phone. If you have a critical or abnormal lab result, we will notify you by phone as soon as possible.  Submit refill requests through [email or web address] or call your pharmacy and they will forward the refill request to us. Please allow 3 business days for your refill to be completed.          Additional Information About Your Visit        MyChart Information     [email or web address] gives you secure access to your electronic health record. If you see a primary care provider, you can also send messages to your care team and make appointments. If you have questions, please call your primary care clinic.  If you do not have a primary care provider, please call [phone] and they will assist you.        Care EveryWhere ID     This is your Care EveryWhere ID. This could be used by other organizations to access your [place] medical records  [id number]        Your Vitals Were     Pulse Respirations Height Pulse Oximetry BMI (Body Mass Index)       74 16 1.797 m (5' 10.75\") 98% 29.64 kg/m2        Blood Pressure from Last 3 " Encounters:   03/23/18 124/86   03/23/18 124/86   01/02/17 118/66    Weight from Last 3 Encounters:   03/23/18 95.7 kg (211 lb)   03/23/18 95.7 kg (211 lb)   01/02/17 97.1 kg (214 lb)              We Performed the Following     Comprehensive DME          Today's Medication Changes          These changes are accurate as of 3/23/18  2:05 PM.  If you have any questions, ask your nurse or doctor.               These medicines have changed or have updated prescriptions.        Dose/Directions    indapamide 1.25 MG tablet   Commonly known as:  LOZOL   This may have changed:  additional instructions   Used for:  Essential hypertension with goal blood pressure less than 130/80   Changed by:  Orville Galeas MD        Dose:  1.25 mg   Take 1 tablet (1.25 mg) by mouth every morning   Quantity:  90 tablet   Refills:  3       PARoxetine 12.5 MG 24 hr tablet   Commonly known as:  PAXIL-CR   This may have changed:  See the new instructions.   Used for:  Adjustment disorder with depressed mood   Changed by:  Orville Galeas MD        Dose:  12.5 mg   Take 1 tablet (12.5 mg) by mouth every morning   Quantity:  90 tablet   Refills:  3       propranolol HCl 60 MG Tabs   This may have changed:  Another medication with the same name was removed. Continue taking this medication, and follow the directions you see here.   Changed by:  Orville Galeas MD        Dose:  1 tablet   Take 1 tablet by mouth 2 times daily   Refills:  0            Where to get your medicines      These medications were sent to [facility] [place], [place] - [address]  [address] [address]     Phone:  [phone]     atorvastatin 40 MG tablet    indapamide 1.25 MG tablet    PARoxetine 12.5 MG 24 hr tablet                Primary Care Provider Office Phone # Fax #    Orville Galeas [phone]       [address] [place] [address]        Equal Access to Services     HUMBLE MOYA AH:  Hadii aad ku hadvickeyo Soomaali, waaxda luqadaha, qaybta kaalmada eliel, deonte osbaldoin hayaavonnie friendsuman meadows lastuartvonnie anson. So [facility] [phone].    ATENCIÓN: Si delia smiley, tiene a jackson disposición servicios gratuitos de asistencia lingüística. Ushaame al [phone].    We comply with applicable federal civil rights laws and [place] laws. We do not discriminate on the basis of race, color, national origin, age, disability, sex, sexual orientation, or gender identity.            Thank you!     Thank you for choosing [facility]  for your care. Our goal is always to provide you with excellent care. Hearing back from our patients is one way we can continue to improve our services. Please take a few minutes to complete the written survey that you may receive in the mail after your visit with us. Thank you!             Your Updated Medication List - Protect others around you: Learn how to safely use, store and throw away your medicines at www.disposemymeds.org.          This list is accurate as of 3/23/18  2:05 PM.  Always use your most recent med list.                   Brand Name Dispense Instructions for use Diagnosis    albuterol 108 (90 BASE) MCG/ACT Inhaler    VENTOLIN HFA    18 g    INHALE 2 PUFFS BY MOUTH EVERY 6 HOURS AS NEEDED FOR SHORTNESS OF BREATH / DYSPNEA    Asthma       ASPIRIN PO      Take 162 mg by mouth        atorvastatin 40 MG tablet    LIPITOR    180 tablet    Take 2 tablets (80 mg) by mouth daily TAKE 1 TABLET BY MOUTH DAILY    Hyperlipidemia LDL goal <130       baclofen 20 MG tablet    LIORESAL     Take 10 mg by mouth 2 times daily        clonazePAM 1 MG tablet    klonoPIN    30 tablet    Take 1 tablet (1 mg) by mouth daily    Tremor       * COMPRESSION STOCKINGS     1 each    1 each daily.    Edema, Ankle pain       fexofenadine 180 MG tablet    ALLEGRA    30 tablet    Take 1 tablet (180 mg) by mouth daily as needed    Chronic rhinitis       gabapentin 100 MG capsule     NEURONTIN     Take 300 mg by mouth daily        indapamide 1.25 MG tablet    LOZOL    90 tablet    Take 1 tablet (1.25 mg) by mouth every morning    Essential hypertension with goal blood pressure less than 130/80       ketoconazole 2 % cream    NIZORAL    60 g    APPLY TO AFFECTED AREA  on ear, face and handsBID    Dermatitis       * other medical supplies      lotion for his hands        PARoxetine 12.5 MG 24 hr tablet    PAXIL-CR    90 tablet    Take 1 tablet (12.5 mg) by mouth every morning    Adjustment disorder with depressed mood       propranolol HCl 60 MG Tabs      Take 1 tablet by mouth 2 times daily        triamcinolone 0.5 % cream    KENALOG    45 g    Apply topically 2 times daily Apply 0.25 inches topically.    Rash       * Notice:  This list has 2 medication(s) that are the same as other medications prescribed for you. Read the directions carefully, and ask your doctor or other care provider to review them with you.

## 2018-03-23 NOTE — NURSING NOTE
"Chief Complaint   Patient presents with     Recheck Medication     Htn  Lipids  Asthma       Initial BP 98/64  Pulse 74  Temp 96.9  F (36.1  C) (Temporal)  Resp 16  Ht 5' 10.75\" (1.797 m)  Wt 211 lb (95.7 kg)  SpO2 98%  BMI 29.64 kg/m2 Estimated body mass index is 29.64 kg/(m^2) as calculated from the following:    Height as of this encounter: 5' 10.75\" (1.797 m).    Weight as of this encounter: 211 lb (95.7 kg).  Medication Reconciliation: complete    "

## 2018-03-23 NOTE — PROGRESS NOTES
SUBJECTIVE:   Dylon Barragan is a 68 year old male who presents to clinic today for the following health issues:    Chief Complaint   Patient presents with     Recheck Medication     Htn  Lipids  Asthma     He hasn't been in for a year.      He saw his neurologist two weeks ago.  Right side was bothering. They started Baclofen and he is better, more agile and doing ok.      BP at neurology was 140 range so they increased propranolol to 60 mg bid and he has been monitoring at home.  Getting 138-150 range systolic.      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     Complication of anesthesia     half an airway due to stroke hx     Dysphagia      Edema      Essential hypertension, benign      Mixed hyperlipidemia      Other psoriasis      Personal history of unspecified circulatory disease      Sleep apnea     CPAP     Unspecified cerebral artery occlusion with cerebral infarction     1999     Ventral hernia, unspecified, without mention of obstruction or gangrene      Current Outpatient Prescriptions   Medication     baclofen (LIORESAL) 20 MG tablet     propranolol HCl 60 MG TABS     indapamide (LOZOL) 1.25 MG tablet     albuterol (VENTOLIN HFA) 108 (90 BASE) MCG/ACT Inhaler     PARoxetine (PAXIL-CR) 12.5 MG 24 hr tablet     aspirin 325 MG EC tablet     atorvastatin (LIPITOR) 40 MG tablet     clonazePAM (KLONOPIN) 1 MG tablet     gabapentin (NEURONTIN) 100 MG capsule     ketoconazole (NIZORAL) 2 % cream     fexofenadine (ALLEGRA) 180 MG tablet     triamcinolone (KENALOG) 0.5 % cream     [DISCONTINUED] propranolol (INDERAL) 20 MG tablet     COMPRESSION STOCKINGS     OTHER MEDICAL SUPPLIES     No current facility-administered medications for this visit.        Review of Systems  Constitutional-No fevers, chills, or weight changes..  Cardiac-No chest pain or palpitations.  Respiratory-No cough, sob,  "or hemoptysis.  GI-No nausea, vomitting, diarrhea, constipation, or blood in the stool.  Musculoskeletal-pains on the right side of better with baclofen the last few weeks..    Physical Exam  /86  Pulse 74  Temp 96.9  F (36.1  C) (Temporal)  Resp 16  Ht 5' 10.75\" (1.797 m)  Wt 211 lb (95.7 kg)  SpO2 98%  BMI 29.64 kg/m2  General Appearance-healthy, alert, no distress  Cardiac-regular rate and rhythm  with normal S1, S2 ; no murmur, rub or gallops  Lungs-clear to auscultation  Extremities- Trace edema in both lower extremities    ASSESSMENT:  68-year-old gentleman had a stroke 19 years ago.  He does have hypertension, he was recently seen by his neurologist.  His blood pressure was high initially increased his propranolol.  His blood pressure today is actually quite good on recheck is 124/86 on the right side and 130/80 on the left side.  He will continue his medications.  We will check his labs today including fasting lipids and his electrolytes.    He will continue on his Paxil even of low-dose is doing well.  Can consider weaning this off in the future.    Electronically signed by Orville Galeas MD        "

## 2018-03-24 ASSESSMENT — ASTHMA QUESTIONNAIRES: ACT_TOTALSCORE: 24

## 2018-03-26 DIAGNOSIS — G47.33 OSA (OBSTRUCTIVE SLEEP APNEA): Primary | ICD-10-CM

## 2018-05-14 ENCOUNTER — TELEPHONE (OUTPATIENT)
Dept: FAMILY MEDICINE | Facility: CLINIC | Age: 68
End: 2018-05-14

## 2018-05-14 ENCOUNTER — TRANSFERRED RECORDS (OUTPATIENT)
Dept: HEALTH INFORMATION MANAGEMENT | Facility: CLINIC | Age: 68
End: 2018-05-14

## 2018-05-14 DIAGNOSIS — E78.5 HYPERLIPIDEMIA LDL GOAL <130: ICD-10-CM

## 2018-05-14 RX ORDER — ATORVASTATIN CALCIUM 40 MG/1
40 TABLET, FILM COATED ORAL DAILY
Qty: 90 TABLET | Refills: 3 | Status: SHIPPED | OUTPATIENT
Start: 2018-05-14 | End: 2019-06-24

## 2018-05-14 NOTE — TELEPHONE ENCOUNTER
"On 3/23/18 a new rx was sent for Lipitor 40mg with sig of \"Take 2 tablets (80mg) po daily TAKE 1 TABLET BY MOUTH DAILY\".    We had contacted you to verify one daily or two daily and we noted on the rx it was verified at 2 daily.      Per patient has only been taking one tablet (40mg) daily.  He says he has always taken as one tablet daily (40mg tabs).    Please send new rx with correct directions.    Thank you   -Michelle Garcia, Pharm.D., [facility], [phone]  "

## 2018-07-05 ENCOUNTER — TELEPHONE (OUTPATIENT)
Dept: INTERNAL MEDICINE | Facility: CLINIC | Age: 68
End: 2018-07-05

## 2018-07-05 NOTE — TELEPHONE ENCOUNTER
": 1950  PHONE #'s: [phone] (home) [phone] (work)    PRESENTING PROBLEM:  Had a problem swallowing one of his pills last night. 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 So I do have a hard time, once in a while with it,  swallowing things. I just usually have to bend forward to move things in my throat and then I am OK. Last night, was not the case. That pill wouldn't move. I think it was a tablet and not a capsule so it probably melted in my throat. \"   Onset/duration:  Last night  Precip. factors: Hx of esophageal problems, intermittent since his stroke 19 years ago.   Assoc. Sx:  This morning I made my wife a sandwich and I ate some lunch meat, that , too felt like it was stuck, but then I drank some milk and it went down. I went to [place] and had a muffin and coffee afterward and that felt fine.  I decided to give it a try and take all 8 of my meds at one time like I usually do , and it went fine.  So not sure what is going on??  Improves/worsens Sx:  Improved.   Pain scale (1-10)   It feels better today, but last night was BAD.  Sx specific meds:  NA  Last exam/Tx:   Has NOT been seen for this.     RECOMMENDED DISPOSITION:  Home care advice - gargle with warm tap water to sooth throat. May want to followup with ENT to see if any damage to throat from pill melting.  Patient states he wants to give it a few days to see if it feels better like today, IF so, he will cancel appt with ENT. [facility] up kaitlin with DR. Galeano, whom he has seen in the past.   Will comply with recommendation: YES  If further questions/concerns or if Sx do not improve, worsen or new Sx develop, call your PCP or [facility] as soon as possible.    NOTES:  Disposition was determined by the first positive assessment question, " therefore all previous assessment questions were negative.  Informed to check provider manual or call insurance company to assure coverage.    Guideline used: Swallowing Difficulty  Telephone Triage Protocols for Nurses, Fifth Edition, Yoon Asencio RN  July 5, 2018

## 2018-07-05 NOTE — TELEPHONE ENCOUNTER
Reason for call:  Patient reporting a symptom    Symptom or request: Patient states he had a hard time swallowing one of his pills last night and it got stuck in a pocket in his throat and it was burning and swollen. Patient states it is better than last night but wanted to make us aware of it. Patient had a stroke 20 years ago and has problems with swallowing at times. Patient is concerned and is worried about aspirating.     Duration (how long have symptoms been present): 1 day    Have you been treated for this before? No    Additional comments:     Phone Number patient can be reached at:  Home number on file [phone] (home)    Best Time:  any    Can we leave a detailed message on this number:  YES    Call taken on 7/5/2018 at 10:45 AM by Alejandra Salter

## 2018-07-25 ENCOUNTER — TRANSFERRED RECORDS (OUTPATIENT)
Dept: HEALTH INFORMATION MANAGEMENT | Facility: CLINIC | Age: 68
End: 2018-07-25

## 2018-09-26 ENCOUNTER — TRANSFERRED RECORDS (OUTPATIENT)
Dept: HEALTH INFORMATION MANAGEMENT | Facility: CLINIC | Age: 68
End: 2018-09-26

## 2018-10-05 ENCOUNTER — TRANSFERRED RECORDS (OUTPATIENT)
Dept: HEALTH INFORMATION MANAGEMENT | Facility: CLINIC | Age: 68
End: 2018-10-05

## 2018-11-19 ENCOUNTER — TELEPHONE (OUTPATIENT)
Dept: FAMILY MEDICINE | Facility: CLINIC | Age: 68
End: 2018-11-19

## 2018-11-19 NOTE — TELEPHONE ENCOUNTER
Reason for Call:  Other prescription    Detailed comments: Paxil is now a tier 3 and is too expensive. He is wondering if there may be a cheaper alternative? He did not have a list of what they would cover.     Phone Number Patient can be reached at: Home number on file [phone] (home)    Best Time: any     Can we leave a detailed message on this number? YES    Call taken on 11/19/2018 at 1:58 PM by Dorinda Abreu

## 2018-11-19 NOTE — TELEPHONE ENCOUNTER
He could go to prozac, zoloft, celexa but the switch can be a little hard.  Paxil is old and should be generic.

## 2018-11-20 NOTE — TELEPHONE ENCOUNTER
Pt was advised of the recommendations and verbalized understanding. Pt states he prefers not to switch drug because he's been doing so well on Paxil. Pt states he is switching insurance company at the first of the year.

## 2018-11-30 ENCOUNTER — TELEPHONE (OUTPATIENT)
Dept: INTERNAL MEDICINE | Facility: CLINIC | Age: 68
End: 2018-11-30

## 2018-11-30 DIAGNOSIS — J06.9 UPPER RESPIRATORY TRACT INFECTION, UNSPECIFIED TYPE: Primary | ICD-10-CM

## 2018-11-30 RX ORDER — PREDNISONE 20 MG/1
20 TABLET ORAL DAILY
Qty: 5 TABLET | Refills: 0 | Status: SHIPPED | OUTPATIENT
Start: 2018-11-30 | End: 2018-12-05

## 2018-11-30 NOTE — TELEPHONE ENCOUNTER
"Dylon Barragan is a 68 year old male who calls with sinus congestion.    NURSING ASSESSMENT:  Description:  Patient states that he has had sinus congestion and green discharge intermittently for the past 3 days.  Patient states it increases with a cough later on in the evening. He states he has sharp \"above the eye\" headache pain intermittently and some cheek pain.  Patient is concerned about these symptoms creating an URI due to past history.  Patient states since stroke, he has only one good airway.  Patient has an appointment on Wednesday 12/5/18, but wants to know if he should be seen sooner to be proactive.   Patient denies vision change, fever, persistent dull ache around eyes, Chronic cough.  Patient advised to use humidifier, hot water shower running in bathroom, Drink plenty of fluids/ water, home care.    Onset/duration:  3 days  Precip. factors:  Unknown  Associated symptoms:  See above  Improves/worsens symptoms:  NA    Allergies: No Known Allergies    NURSING PLAN: Nursing advice to patient continue home care, call with worsening symptoms.  Will forward to provider for FYI on patient concerns and review.      RECOMMENDED DISPOSITION:  Home care advice   Will comply with recommendation: Yes  If further questions/concerns or if symptoms do not improve, worsen or new symptoms develop, call your PCP or [facility] as soon as possible.      Guideline used: Sinus Problems  Telephone Triage Protocols for Nurses, Fifth Edition, Yoon Villanueva RN      "

## 2018-11-30 NOTE — TELEPHONE ENCOUNTER
Reason for Call:  Medication or medication refill:    Do you use a [facility]?  Name of the pharmacy and phone number for the current request:  [facility]  [phone]    Name of the medication requested: Prednisone?     Other request: Pt states he's getting a sinus infection and has an unopened zpak and is wondering if he should start taking it? And in the past you have prescribed Prednisone. Is this the route you want to go? Please call him back this morning rather than this afternoon and talk to him.     Can we leave a detailed message on this number? YES    Phone number patient can be reached at: Home number on file [phone] (home)    Best Time: anytime    Call taken on 11/30/2018 at 8:16 AM by Kira Fry

## 2018-11-30 NOTE — TELEPHONE ENCOUNTER
Please get more details, length of symptoms and such. Usually only treat if it has been two weeks.

## 2018-12-05 ENCOUNTER — OFFICE VISIT (OUTPATIENT)
Dept: INTERNAL MEDICINE | Facility: CLINIC | Age: 68
End: 2018-12-05
Payer: COMMERCIAL

## 2018-12-05 VITALS
HEART RATE: 68 BPM | DIASTOLIC BLOOD PRESSURE: 66 MMHG | SYSTOLIC BLOOD PRESSURE: 108 MMHG | RESPIRATION RATE: 16 BRPM | WEIGHT: 212 LBS | TEMPERATURE: 96.9 F | OXYGEN SATURATION: 96 % | BODY MASS INDEX: 29.78 KG/M2

## 2018-12-05 DIAGNOSIS — F43.21 ADJUSTMENT DISORDER WITH DEPRESSED MOOD: Primary | ICD-10-CM

## 2018-12-05 DIAGNOSIS — I10 ESSENTIAL HYPERTENSION WITH GOAL BLOOD PRESSURE LESS THAN 130/80: ICD-10-CM

## 2018-12-05 DIAGNOSIS — R25.1 TREMOR: ICD-10-CM

## 2018-12-05 DIAGNOSIS — G47.33 OSA (OBSTRUCTIVE SLEEP APNEA): ICD-10-CM

## 2018-12-05 PROCEDURE — 99214 OFFICE O/P EST MOD 30 MIN: CPT | Performed by: INTERNAL MEDICINE

## 2018-12-05 RX ORDER — PAROXETINE 10 MG/1
10 TABLET, FILM COATED ORAL AT BEDTIME
Qty: 90 TABLET | Refills: 3 | Status: SHIPPED | OUTPATIENT
Start: 2018-12-05 | End: 2019-07-02 | Stop reason: ALTCHOICE

## 2018-12-05 ASSESSMENT — PAIN SCALES - GENERAL: PAINLEVEL: NO PAIN (0)

## 2018-12-05 ASSESSMENT — PATIENT HEALTH QUESTIONNAIRE - PHQ9: SUM OF ALL RESPONSES TO PHQ QUESTIONS 1-9: 7

## 2018-12-05 NOTE — PROGRESS NOTES
SUBJECTIVE:   Dylon Barragan is a 68 year old male who presents to clinic today for the following health issues:    Chief Complaint   Patient presents with     Recheck Medication     Htn  Lipids  GERD       He had a URI and we gave him prednisone with a zpak he had, now doing well. Today was last day of medications, feeling better, some congestion.      BP is doing ok, weight is stable.    No side effects with medications.    Will change to medicare and supplement next year. Paxil is going up to tier 4      Feeling ok, sleep is messed up.  Staying up later then normal lately.     Has some tremor and shaking.  Sees neurology, got on baclofen for sore and stiff muscles.      Had a flu and pneumonia shot.    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     Complication of anesthesia     half an airway due to stroke hx     Dysphagia      Edema      Essential hypertension, benign      Mixed hyperlipidemia      Other psoriasis      Personal history of unspecified circulatory disease      Sleep apnea     CPAP     Unspecified cerebral artery occlusion with cerebral infarction     1999     Ventral hernia, unspecified, without mention of obstruction or gangrene      Current Outpatient Prescriptions   Medication     albuterol (VENTOLIN HFA) 108 (90 BASE) MCG/ACT Inhaler     ASPIRIN PO     atorvastatin (LIPITOR) 40 MG tablet     baclofen (LIORESAL) 20 MG tablet     clonazePAM (KLONOPIN) 1 MG tablet     fexofenadine (ALLEGRA) 180 MG tablet     indapamide (LOZOL) 1.25 MG tablet     ketoconazole (NIZORAL) 2 % cream     PARoxetine (PAXIL) 10 MG tablet     propranolol HCl 60 MG TABS     triamcinolone (KENALOG) 0.5 % cream     COMPRESSION STOCKINGS     OTHER MEDICAL SUPPLIES     [DISCONTINUED] PARoxetine (PAXIL-CR) 12.5 MG 24 hr tablet     No current facility-administered medications for this visit.      Review of  Systems  Constitutional-No fevers, chills, or weight changes..  ENT-Rhinitis.  Cardiac-No chest pain or palpitations.  Respiratory-Cough without sputum.    Physical Exam  /66  Pulse 68  Temp 96.9  F (36.1  C) (Temporal)  Resp 16  Wt 212 lb (96.2 kg)  SpO2 96%  BMI 29.78 kg/m2  General Appearance-healthy, alert, no distress  ENT-ENT exam normal, no neck nodes or sinus tenderness  Cardiac-regular rate and rhythm  with normal S1, S2 ; no murmur, rub or gallops  Lungs-clear to auscultation    ASSESSMENT:  This is a 68-year-old gentleman who comes in for recheck of his blood pressure, hyperlipidemia, depression and recently had an upper respiratory infection.  He has a history of a stroke but is done very well since then.  He does follow with his neurologist and has been placed on baclofen for some muscle spasms, Klonopin the pain was well.    Recent upper respiratory infection he did have a Z-Quinten at home which he took I gave him some prednisone to help he has finished all 5 days and is at the point where he is getting better.  His lung exam today is benign with clear lungs no wheezing no crackles.    Hypertension is doing well his blood pressure is 108/66, we will continue him on an Depramine and follow his blood pressure.    Slight tremor the patient follows with neurology he is on propranolol for his blood pressure and tremors.    Hyperlipidemia the patient had lipids and LFTs done in the spring we will repeat that at his physical, make sure his atorvastatin is taken and filled.    Electronically signed by Orville Galeas MD

## 2018-12-05 NOTE — MR AVS SNAPSHOT
After Visit Summary   12/5/2018    Dylon Barragan    MRN: [id number]           Patient Information     Date Of Birth          1950        Visit Information        Provider Department      12/5/2018 2:45 PM Orville Galeas MD [facility]         Follow-ups after your visit        Who to contact     If you have questions or need follow up information about today's clinic visit or your schedule please contact [facility] directly at [phone].  Normal or non-critical lab and imaging results will be communicated to you by DriveFactorhart, letter or phone within 4 business days after the clinic has received the results. If you do not hear from us within 7 days, please contact the clinic through dotSyntaxt or phone. If you have a critical or abnormal lab result, we will notify you by phone as soon as possible.  Submit refill requests through [employer] or call your pharmacy and they will forward the refill request to us. Please allow 3 business days for your refill to be completed.          Additional Information About Your Visit        DriveFactorhart Information     [employer] gives you secure access to your electronic health record. If you see a primary care provider, you can also send messages to your care team and make appointments. If you have questions, please call your primary care clinic.  If you do not have a primary care provider, please call [phone] and they will assist you.        Care EveryWhere ID     This is your Care EveryWhere ID. This could be used by other organizations to access your [place] medical records  [id number]        Your Vitals Were     Pulse Temperature Respirations Pulse Oximetry BMI (Body Mass Index)       68 96.9  F (36.1  C) (Temporal) 16 96% 29.78 kg/m2        Blood Pressure from Last 3 Encounters:   12/05/18 108/66   03/23/18 124/86   03/23/18 124/86    Weight from Last 3 Encounters:   12/05/18 212 lb (96.2 kg)   03/23/18 211 lb (95.7 kg)    03/23/18 211 lb (95.7 kg)              Today, you had the following     No orders found for display       Primary Care Provider Office Phone # Fax #    Orville Galeas [phone] [address] [facility] [address]        Equal Access to Services     LIVGLADIS NITA : Hadii aad ku hadasho Soomaali, waaxda luqadaha, qaybta kaalmada adeegyada, waxay idiin hayaan adeeg abdiel lapatricia . So [facility] [phone].    ATENCIÓN: Si habla español, tiene a jackson disposición servicios gratuitos de asistencia lingüística. Adia al [phone].    We comply with applicable federal civil rights laws and [place] laws. We do not discriminate on the basis of race, color, national origin, age, disability, sex, sexual orientation, or gender identity.            Thank you!     Thank you for choosing [facility]  for your care. Our goal is always to provide you with excellent care. Hearing back from our patients is one way we can continue to improve our services. Please take a few minutes to complete the written survey that you may receive in the mail after your visit with us. Thank you!             Your Updated Medication List - Protect others around you: Learn how to safely use, store and throw away your medicines at www.disposemymeds.org.          This list is accurate as of 12/5/18  2:48 PM.  Always use your most recent med list.                   Brand Name Dispense Instructions for use Diagnosis    albuterol 108 (90 Base) MCG/ACT inhaler    VENTOLIN HFA    18 g    INHALE 2 PUFFS BY MOUTH EVERY 6 HOURS AS NEEDED FOR SHORTNESS OF BREATH / DYSPNEA    Asthma       ASPIRIN PO      Take 162 mg by mouth        atorvastatin 40 MG tablet    LIPITOR    90 tablet    Take 1 tablet (40 mg) by mouth daily TAKE 1 TABLET BY MOUTH DAILY    Hyperlipidemia LDL goal <130       baclofen 20 MG tablet    LIORESAL     Take 10 mg by mouth 2 times daily        clonazePAM 1 MG tablet    klonoPIN    30 tablet    Take 1  tablet (1 mg) by mouth daily    Tremor       * COMPRESSION STOCKINGS     1 each    1 each daily.    Edema, Ankle pain       fexofenadine 180 MG tablet    ALLEGRA    30 tablet    Take 1 tablet (180 mg) by mouth daily as needed    Chronic rhinitis       indapamide 1.25 MG tablet    LOZOL    90 tablet    Take 1 tablet (1.25 mg) by mouth every morning    Essential hypertension with goal blood pressure less than 130/80       ketoconazole 2 % external cream    NIZORAL    60 g    APPLY TO AFFECTED AREA  on ear, face and handsBID    Dermatitis       * other medical supplies      lotion for his hands        PARoxetine 12.5 MG 24 hr tablet    PAXIL-CR    90 tablet    Take 1 tablet (12.5 mg) by mouth every morning    Adjustment disorder with depressed mood       propranolol 60 MG tablet    INDERAL     Take 1 tablet by mouth 2 times daily        triamcinolone 0.5 % external cream    KENALOG    45 g    Apply topically 2 times daily Apply 0.25 inches topically.    Rash       * Notice:  This list has 2 medication(s) that are the same as other medications prescribed for you. Read the directions carefully, and ask your doctor or other care provider to review them with you.

## 2019-01-04 ENCOUNTER — OFFICE VISIT (OUTPATIENT)
Dept: FAMILY MEDICINE | Facility: OTHER | Age: 69
End: 2019-01-04
Payer: COMMERCIAL

## 2019-01-04 VITALS
BODY MASS INDEX: 29.92 KG/M2 | WEIGHT: 213 LBS | TEMPERATURE: 98.4 F | SYSTOLIC BLOOD PRESSURE: 112 MMHG | HEART RATE: 62 BPM | RESPIRATION RATE: 14 BRPM | OXYGEN SATURATION: 97 % | DIASTOLIC BLOOD PRESSURE: 80 MMHG

## 2019-01-04 DIAGNOSIS — J01.90 ACUTE SINUSITIS, RECURRENCE NOT SPECIFIED, UNSPECIFIED LOCATION: ICD-10-CM

## 2019-01-04 DIAGNOSIS — H10.33 ACUTE BACTERIAL CONJUNCTIVITIS OF BOTH EYES: Primary | ICD-10-CM

## 2019-01-04 PROCEDURE — 99213 OFFICE O/P EST LOW 20 MIN: CPT | Performed by: PHYSICIAN ASSISTANT

## 2019-01-04 RX ORDER — AMOXICILLIN AND CLAVULANATE POTASSIUM 600; 42.9 MG/5ML; MG/5ML
865 POWDER, FOR SUSPENSION ORAL 2 TIMES DAILY
Qty: 144 ML | Refills: 0 | Status: SHIPPED | OUTPATIENT
Start: 2019-01-04 | End: 2019-05-21

## 2019-01-04 RX ORDER — POLYMYXIN B SULFATE AND TRIMETHOPRIM 1; 10000 MG/ML; [USP'U]/ML
1-2 SOLUTION OPHTHALMIC 4 TIMES DAILY
Qty: 1 BOTTLE | Refills: 0 | Status: SHIPPED | OUTPATIENT
Start: 2019-01-04 | End: 2019-05-21

## 2019-01-04 NOTE — PROGRESS NOTES
"  SUBJECTIVE:   Dylon Barragan is a 68 year old male who presents to clinic today for the following health issues:      HPI  Acute Illness   Acute illness concerns: cold/sore throat  Onset: last month, he took a Z-judi and prednisone, which started clearing up after a week, but \"then got it back and now it's not in chest but in my sinuses\"    Fever: no    Chills/Sweats: YES    Headache (location?): no    Sinus Pressure:YES- post-nasal drainage and facial pain    Conjunctivitis:  YES: both - \"using azithromycin salve from eye doctor\"    Ear Pain: no    Rhinorrhea: YES- \"green mucous\"    Congestion: YES    Sore Throat: YES- during the day it's better, but at night it's \"so bad\"     Cough: YES-productive of green sputum    Wheeze: no    Decreased Appetite: no - \"not like it normally is\"    Nausea: no    Vomiting: no    Diarrhea:  no    Dysuria/Freq.: no    Fatigue/Achiness: YES    Sick/Strep Exposure: no     Therapies Tried and outcome: Tylenol    He got better on the Z-pack and prednisone last month but then symptoms returned about 1 week ago with sinus pressure, nasal congestion with green mucous discharge and a sore throat. He has a slight cough as well but not as bad as when he was sick last month. He also has redness and drainage from both eyes, worse on the left. No fever.     Problem list and histories reviewed & adjusted, as indicated.  Additional history: none    ROS:  GENERAL: Denies fever, fatigue, weakness, weight gain, or weight loss.  HEENT: Eyes-Denies pain, redness, loss of vision, double or blurred vision.     Ears/Nose- +Nasal/sinus congestion, sore throat. Denies tinnitus, loss of hearing, epistaxis, decreased sense of smell. Denies loss of sense of taste, dry mouth.  CARDIOVASCULAR: Denies chest pain, shortness of breath, irregular heartbeats, palpitations, or edema.  RESPIRATORY: +Productive cough. Denies hemoptysis and shortness of breath.    OBJECTIVE:     /80   Pulse 62   Temp 98.4  F " (36.9  C) (Temporal)   Resp 14   Wt 96.6 kg (213 lb)   SpO2 97%   BMI 29.92 kg/m    Body mass index is 29.92 kg/m .  GENERAL: healthy, alert and no distress  EYES: Bilateral scleral and conjunctival erythema, worse on the left with yellowish discharge. PERRL.  HENT: ear canals and TM's normal. Nasal mucosa is erythematous and edematous, worse on the left. Pharynx is clear.   NECK: no adenopathy, no asymmetry, masses, or scars and thyroid normal to palpation  RESP: lungs clear to auscultation - no rales, rhonchi or wheezes  CV: regular rate and rhythm, normal S1 S2, no S3 or S4, no murmur, click or rub    ASSESSMENT/PLAN:       ICD-10-CM    1. Acute bacterial conjunctivitis of both eyes H10.33 trimethoprim-polymyxin b (POLYTRIM) 34939-7.1 UNIT/ML-% ophthalmic solution   2. Acute sinusitis, recurrence not specified, unspecified location J01.90 amoxicillin-clavulanate (AUGMENTIN-ES) 600-42.9 MG/5ML suspension       Will prescribe Polytrim eye drops to use 4 times daily for 10 days for the conjunctivitis  I recommend warm compresses over the eyes.  Will also prescribe Augmentin to take twice daily for 10 days if symptoms are not improving over the next few days to treat for sinusitis. He has difficulty swallowing pills so will prescribe the suspension form. Instructed to take a daily probiotic or Activia yogurt while you are on this.  Encouraged to drink plenty of fluids.  Can use an over the counter Nettipot or sinus rinse to help with nasal congestion. Mucinex can also be helpful.   I also recommend Flonase to help with nasal swelling.  Follow up if symptoms are not improving.      Oumar Yadav PA-C  [facility]

## 2019-01-04 NOTE — PATIENT INSTRUCTIONS
Will prescribe an antibiotic called Augmentin to take twice daily for 10 days if symptoms are not improving over the next few days. Take a daily probiotic or Activia yogurt while you are on this.  Drink plenty of fluids.  Can use an over the counter Nettipot or sinus rinse to help with nasal congestion. Mucinex can also be helpful.   I also recommend Flonase to help with nasal swelling.  Follow up if symptoms are not improving.

## 2019-04-03 ENCOUNTER — TRANSFERRED RECORDS (OUTPATIENT)
Dept: HEALTH INFORMATION MANAGEMENT | Facility: CLINIC | Age: 69
End: 2019-04-03

## 2019-05-21 ENCOUNTER — OFFICE VISIT (OUTPATIENT)
Dept: INTERNAL MEDICINE | Facility: CLINIC | Age: 69
End: 2019-05-21
Payer: COMMERCIAL

## 2019-05-21 VITALS
OXYGEN SATURATION: 96 % | HEIGHT: 71 IN | WEIGHT: 214 LBS | TEMPERATURE: 97.2 F | DIASTOLIC BLOOD PRESSURE: 70 MMHG | BODY MASS INDEX: 29.96 KG/M2 | HEART RATE: 68 BPM | RESPIRATION RATE: 16 BRPM | SYSTOLIC BLOOD PRESSURE: 118 MMHG

## 2019-05-21 DIAGNOSIS — R07.89 ATYPICAL CHEST PAIN: ICD-10-CM

## 2019-05-21 DIAGNOSIS — J30.1 SEASONAL ALLERGIC RHINITIS DUE TO POLLEN: ICD-10-CM

## 2019-05-21 DIAGNOSIS — R06.02 SOB (SHORTNESS OF BREATH) ON EXERTION: Primary | ICD-10-CM

## 2019-05-21 LAB
ALBUMIN SERPL-MCNC: 3.7 G/DL (ref 3.4–5)
ALP SERPL-CCNC: 96 U/L (ref 40–150)
ALT SERPL W P-5'-P-CCNC: 40 U/L (ref 0–70)
ANION GAP SERPL CALCULATED.3IONS-SCNC: 5 MMOL/L (ref 3–14)
AST SERPL W P-5'-P-CCNC: 25 U/L (ref 0–45)
BILIRUB SERPL-MCNC: 0.8 MG/DL (ref 0.2–1.3)
BUN SERPL-MCNC: 18 MG/DL (ref 7–30)
CALCIUM SERPL-MCNC: 8.5 MG/DL (ref 8.5–10.1)
CHLORIDE SERPL-SCNC: 101 MMOL/L (ref 94–109)
CO2 SERPL-SCNC: 35 MMOL/L (ref 20–32)
CREAT SERPL-MCNC: 0.84 MG/DL (ref 0.66–1.25)
ERYTHROCYTE [DISTWIDTH] IN BLOOD BY AUTOMATED COUNT: 13.9 % (ref 10–15)
GFR SERPL CREATININE-BSD FRML MDRD: 89 ML/MIN/{1.73_M2}
GLUCOSE SERPL-MCNC: 126 MG/DL (ref 70–99)
HCT VFR BLD AUTO: 44.6 % (ref 40–53)
HGB BLD-MCNC: 15.2 G/DL (ref 13.3–17.7)
MCH RBC QN AUTO: 30.5 PG (ref 26.5–33)
MCHC RBC AUTO-ENTMCNC: 34.1 G/DL (ref 31.5–36.5)
MCV RBC AUTO: 89 FL (ref 78–100)
PLATELET # BLD AUTO: 124 10E9/L (ref 150–450)
POTASSIUM SERPL-SCNC: 3.5 MMOL/L (ref 3.4–5.3)
PROT SERPL-MCNC: 7.4 G/DL (ref 6.8–8.8)
RBC # BLD AUTO: 4.99 10E12/L (ref 4.4–5.9)
SODIUM SERPL-SCNC: 141 MMOL/L (ref 133–144)
TROPONIN I SERPL-MCNC: <0.015 UG/L (ref 0–0.04)
WBC # BLD AUTO: 7 10E9/L (ref 4–11)

## 2019-05-21 PROCEDURE — 36415 COLL VENOUS BLD VENIPUNCTURE: CPT | Performed by: INTERNAL MEDICINE

## 2019-05-21 PROCEDURE — 84484 ASSAY OF TROPONIN QUANT: CPT | Performed by: INTERNAL MEDICINE

## 2019-05-21 PROCEDURE — 99214 OFFICE O/P EST MOD 30 MIN: CPT | Performed by: INTERNAL MEDICINE

## 2019-05-21 PROCEDURE — 85027 COMPLETE CBC AUTOMATED: CPT | Performed by: INTERNAL MEDICINE

## 2019-05-21 PROCEDURE — 80053 COMPREHEN METABOLIC PANEL: CPT | Performed by: INTERNAL MEDICINE

## 2019-05-21 PROCEDURE — 93000 ELECTROCARDIOGRAM COMPLETE: CPT | Performed by: INTERNAL MEDICINE

## 2019-05-21 RX ORDER — PRIMIDONE 50 MG/1
100 TABLET ORAL AT BEDTIME
COMMUNITY
End: 2019-07-15 | Stop reason: ALTCHOICE

## 2019-05-21 ASSESSMENT — PAIN SCALES - GENERAL: PAINLEVEL: NO PAIN (0)

## 2019-05-21 ASSESSMENT — MIFFLIN-ST. JEOR: SCORE: 1748.86

## 2019-05-21 NOTE — PROGRESS NOTES
Subjective     Dylon Barragan is a 69 year old male who presents to clinic today for the following health issues:    HPI   Chief Complaint   Patient presents with     Ear Problem     experiencing different sounds in ears and some dizziness     Shortness of Breath     some episodes of sob with exertion     Ears are concern, almost had vertigo. Swallow and yawn feels ear popping.  Nose always is running.      Gets sob with some heavier walking, once had some chest pain/tightness.  Harder to do yard work.     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     Complication of anesthesia     half an airway due to stroke hx     Dysphagia      Edema      Essential hypertension, benign      Mixed hyperlipidemia      Other psoriasis      Personal history of unspecified circulatory disease      Sleep apnea     CPAP     Unspecified cerebral artery occlusion with cerebral infarction          Ventral hernia, unspecified, without mention of obstruction or gangrene      Current Outpatient Medications   Medication     albuterol (VENTOLIN HFA) 108 (90 BASE) MCG/ACT Inhaler     ASPIRIN PO     atorvastatin (LIPITOR) 40 MG tablet     baclofen (LIORESAL) 20 MG tablet     clonazePAM (KLONOPIN) 1 MG tablet     fexofenadine (ALLEGRA) 180 MG tablet     indapamide (LOZOL) 1.25 MG tablet     PARoxetine (PAXIL) 10 MG tablet     primidone (MYSOLINE) 50 MG tablet     propranolol HCl 60 MG TABS     COMPRESSION STOCKINGS     ketoconazole (NIZORAL) 2 % cream     OTHER MEDICAL SUPPLIES     No current facility-administered medications for this visit.      Social History     Tobacco Use     Smoking status: Former Smoker     Packs/day: 1.00     Years: 20.00     Pack years: 20.00     Types: Cigarettes     Last attempt to quit: 1985     Years since quittin.1     Smokeless tobacco: Never Used     Tobacco comment: spouse smokes  "outside   Substance Use Topics     Alcohol use: Yes     Alcohol/week: 0.0 oz     Comment: 6 per year     Drug use: No     Review of Systems  Constitutional-No fevers, chills, or weight changes..  ENT-No earpain, sore throat, voice changes or rhinitis.  Cardiac-Exertional SOB.  Respiratory-No cough, sob, or hemoptysis.  GI-No nausea, vomitting, diarrhea, constipation, or blood in the stool.  Musculoskeletal-No muscles aches or joint pains.    Physical Exam  /70   Pulse 68   Temp 97.2  F (36.2  C) (Temporal)   Resp 16   Ht 1.797 m (5' 10.75\")   Wt 97.1 kg (214 lb)   SpO2 96%   BMI 30.06 kg/m    General Appearance-healthy, alert, no distress  ENT-ENT exam normal, no neck nodes or sinus tenderness  Cardiac-regular rate and rhythm  with normal S1, S2 ; no murmur, rub or gallops  Lungs-clear to auscultation    EKG-NSR, no st changes    ASSESSMENT:  Patient here for feelings of vertigo and wonders about his ears which are clear on the left and some fluid on the right, I think this is allergies and he has Allegra but he isn't taking it so should restart that.    He also reports some atypical chest pains, sob with heavy exertion.  Concern for angina and heart disease, will check labs today, EKG was ok.  Will get a stress echo in the future, limit exertion until stress test is ok.    Electronically signed by Orville Galeas MD    "

## 2019-05-22 ENCOUNTER — TELEPHONE (OUTPATIENT)
Dept: INTERNAL MEDICINE | Facility: CLINIC | Age: 69
End: 2019-05-22

## 2019-05-22 ASSESSMENT — ASTHMA QUESTIONNAIRES: ACT_TOTALSCORE: 21

## 2019-05-22 NOTE — TELEPHONE ENCOUNTER
Reason for Call:  Other prescription    Detailed comments: Patient was seen yesterday and told to take allegra. Wondering what the dosage and frequency should be. Please call and advise.     Phone Number Patient can be reached at: Home number on file [phone] (home)    Best Time: any    Can we leave a detailed message on this number? YES    Call taken on 5/22/2019 at 12:14 PM by Linsey Winston

## 2019-05-22 NOTE — TELEPHONE ENCOUNTER
Pt calling again. He states you can disregard this. He found the info.  Thank you,  Dorinda Abreu- Patient Representative

## 2019-05-28 ENCOUNTER — TELEPHONE (OUTPATIENT)
Dept: INTERNAL MEDICINE | Facility: CLINIC | Age: 69
End: 2019-05-28

## 2019-05-28 NOTE — TELEPHONE ENCOUNTER
Reason for Call:  Other call back    Detailed comments: Patient is scheduled for a cardiac stress test tomorrow morning. Wondering if he is able to take all of his medication. - Lipitor - baby Asprin -propranolol - baclofen - diapamide     Phone Number Patient can be reached at: Home number on file [phone] (home)    Best Time: any    Can we leave a detailed message on this number? YES    Call taken on 5/28/2019 at 4:48 PM by Linsey Winston

## 2019-05-29 ENCOUNTER — HOSPITAL ENCOUNTER (OUTPATIENT)
Dept: CARDIOLOGY | Facility: CLINIC | Age: 69
Discharge: HOME | End: 2019-05-29
Attending: INTERNAL MEDICINE | Admitting: INTERNAL MEDICINE
Payer: COMMERCIAL

## 2019-05-29 PROCEDURE — 93350 STRESS TTE ONLY: CPT | Mod: 26 | Performed by: INTERNAL MEDICINE

## 2019-05-29 PROCEDURE — 40000264 ECHO STRESS ECHOCARDIOGRAM

## 2019-05-29 PROCEDURE — 93321 DOPPLER ECHO F-UP/LMTD STD: CPT | Mod: 26 | Performed by: INTERNAL MEDICINE

## 2019-05-29 PROCEDURE — 93325 DOPPLER ECHO COLOR FLOW MAPG: CPT | Mod: 26 | Performed by: INTERNAL MEDICINE

## 2019-05-29 PROCEDURE — 25500064 ZZH RX 255 OP 636: Performed by: INTERNAL MEDICINE

## 2019-05-29 PROCEDURE — 93018 CV STRESS TEST I&R ONLY: CPT | Performed by: INTERNAL MEDICINE

## 2019-05-29 PROCEDURE — 93016 CV STRESS TEST SUPVJ ONLY: CPT | Performed by: INTERNAL MEDICINE

## 2019-05-29 RX ADMIN — HUMAN ALBUMIN MICROSPHERES AND PERFLUTREN 4 ML: 10; .22 INJECTION, SOLUTION INTRAVENOUS at 09:25

## 2019-05-30 ENCOUNTER — TELEPHONE (OUTPATIENT)
Dept: INTERNAL MEDICINE | Facility: CLINIC | Age: 69
End: 2019-05-30

## 2019-05-30 DIAGNOSIS — R06.02 SOB (SHORTNESS OF BREATH): Primary | ICD-10-CM

## 2019-05-30 DIAGNOSIS — R07.89 ATYPICAL CHEST PAIN: ICD-10-CM

## 2019-05-30 NOTE — TELEPHONE ENCOUNTER
----- Message from Orville Galeas MD sent at 5/29/2019  5:26 PM CDT -----  His stress echo was non diagnostic  I would like him to have a lexiscan for evaluation.

## 2019-05-31 NOTE — TELEPHONE ENCOUNTER
Patient notified and transferred to specialty to schedule.Instructed to hold propranolol and caffiene.  Jennifer,

## 2019-06-03 DIAGNOSIS — I10 ESSENTIAL HYPERTENSION WITH GOAL BLOOD PRESSURE LESS THAN 130/80: ICD-10-CM

## 2019-06-04 RX ORDER — INDAPAMIDE 1.25 MG/1
1.25 TABLET ORAL EVERY MORNING
Qty: 90 TABLET | Refills: 3 | Status: SHIPPED | OUTPATIENT
Start: 2019-06-04 | End: 2020-03-17

## 2019-06-04 NOTE — TELEPHONE ENCOUNTER
LOZOL  Last Written Prescription Date:  3/23/18  Last Fill Quantity: 90,  # refills: 3  Last office visit: 5/21/2019 with prescribing provider:   Component      Latest Ref Rng & Units 12/28/2016 3/23/2018 5/21/2019   Potassium      3.4 - 5.3 mmol/L 3.7 3.6 3.5     Component      Latest Ref Rng & Units 12/28/2016 3/23/2018 5/21/2019   Creatinine      0.66 - 1.25 mg/dL 0.79 0.66 0.84     Future Office Visit:  NONE  BP Readings from Last 3 Encounters:   05/21/19 118/70   01/04/19 112/80   12/05/18 108/66     Routing refill request to provider for review/approval because:  A break in medication  RabiaRN

## 2019-06-10 ENCOUNTER — TELEPHONE (OUTPATIENT)
Dept: INTERNAL MEDICINE | Facility: CLINIC | Age: 69
End: 2019-06-10

## 2019-06-10 DIAGNOSIS — R39.89 ABNORMAL URINE COLOR: ICD-10-CM

## 2019-06-10 DIAGNOSIS — R10.9 FLANK PAIN: Primary | ICD-10-CM

## 2019-06-10 NOTE — TELEPHONE ENCOUNTER
It should be fine, this scan is ok on the kidneys.  We can check a UA tomorrow to look for blood or infection.

## 2019-06-10 NOTE — TELEPHONE ENCOUNTER
Called patient.  Right flank pain, started about a week ago.  The pain seems to going upwards, no other symptoms except his urine maybe a little on the darker side.  Wondering about the scan and any dye that will be given.

## 2019-06-10 NOTE — TELEPHONE ENCOUNTER
Reason for call:  Patient reporting a symptom    Symptom or request: Pain on back right side-getting worse    Duration (how long have symptoms been present): 1 week    Have you been treated for this before? No    Additional comments: Pt calling and states he is experiencing pain on his back right side and it's getting worse. He is having a Lexiscan tomorrow and he is concerned if he should do this or not. Please advise.     Phone Number patient can be reached at:  Cell number on file:    Telephone Information:   Mobile [phone]       Best Time:  any    Can we leave a detailed message on this number:  YES    Call taken on 6/10/2019 at 2:14 PM by Alejandra Salter

## 2019-06-11 ENCOUNTER — HOSPITAL ENCOUNTER (OUTPATIENT)
Dept: NUCLEAR MEDICINE | Facility: CLINIC | Age: 69
Setting detail: NUCLEAR MEDICINE
Discharge: HOME | End: 2019-06-11
Attending: INTERNAL MEDICINE | Admitting: INTERNAL MEDICINE
Payer: COMMERCIAL

## 2019-06-11 DIAGNOSIS — R07.89 ATYPICAL CHEST PAIN: ICD-10-CM

## 2019-06-11 DIAGNOSIS — R10.9 FLANK PAIN: ICD-10-CM

## 2019-06-11 DIAGNOSIS — R39.89 ABNORMAL URINE COLOR: ICD-10-CM

## 2019-06-11 DIAGNOSIS — R06.02 SOB (SHORTNESS OF BREATH): ICD-10-CM

## 2019-06-11 LAB
ALBUMIN UR-MCNC: NEGATIVE MG/DL
APPEARANCE UR: CLEAR
BILIRUB UR QL STRIP: NEGATIVE
COLOR UR AUTO: YELLOW
GLUCOSE UR STRIP-MCNC: NEGATIVE MG/DL
HGB UR QL STRIP: NEGATIVE
KETONES UR STRIP-MCNC: NEGATIVE MG/DL
LEUKOCYTE ESTERASE UR QL STRIP: NEGATIVE
NITRATE UR QL: NEGATIVE
PH UR STRIP: 6 PH (ref 5–7)
SOURCE: NORMAL
SP GR UR STRIP: 1.02 (ref 1–1.03)
UROBILINOGEN UR STRIP-MCNC: 0 MG/DL (ref 0–2)

## 2019-06-11 PROCEDURE — 78452 HT MUSCLE IMAGE SPECT MULT: CPT

## 2019-06-11 PROCEDURE — 93017 CV STRESS TEST TRACING ONLY: CPT | Performed by: REHABILITATION PRACTITIONER

## 2019-06-11 PROCEDURE — 78452 HT MUSCLE IMAGE SPECT MULT: CPT | Mod: 26 | Performed by: INTERNAL MEDICINE

## 2019-06-11 PROCEDURE — 93018 CV STRESS TEST I&R ONLY: CPT | Performed by: INTERNAL MEDICINE

## 2019-06-11 PROCEDURE — 34300033 ZZH RX 343: Performed by: INTERNAL MEDICINE

## 2019-06-11 PROCEDURE — A9502 TC99M TETROFOSMIN: HCPCS | Performed by: INTERNAL MEDICINE

## 2019-06-11 PROCEDURE — 25000128 H RX IP 250 OP 636: Performed by: INTERNAL MEDICINE

## 2019-06-11 PROCEDURE — 93016 CV STRESS TEST SUPVJ ONLY: CPT | Performed by: INTERNAL MEDICINE

## 2019-06-11 PROCEDURE — 81003 URINALYSIS AUTO W/O SCOPE: CPT | Performed by: INTERNAL MEDICINE

## 2019-06-11 RX ORDER — REGADENOSON 0.08 MG/ML
0.4 INJECTION, SOLUTION INTRAVENOUS ONCE
Status: COMPLETED | OUTPATIENT
Start: 2019-06-11 | End: 2019-06-11

## 2019-06-11 RX ADMIN — TETROFOSMIN 10 MCI.: 1.38 INJECTION, POWDER, LYOPHILIZED, FOR SOLUTION INTRAVENOUS at 08:45

## 2019-06-11 RX ADMIN — REGADENOSON 0.4 MG: 0.08 INJECTION, SOLUTION INTRAVENOUS at 10:19

## 2019-06-11 RX ADMIN — TETROFOSMIN 31.9 MCI.: 1.38 INJECTION, POWDER, LYOPHILIZED, FOR SOLUTION INTRAVENOUS at 10:13

## 2019-06-12 ENCOUNTER — TELEPHONE (OUTPATIENT)
Dept: INTERNAL MEDICINE | Facility: CLINIC | Age: 69
End: 2019-06-12

## 2019-06-12 ENCOUNTER — OFFICE VISIT (OUTPATIENT)
Dept: INTERNAL MEDICINE | Facility: CLINIC | Age: 69
End: 2019-06-12
Payer: COMMERCIAL

## 2019-06-12 VITALS
TEMPERATURE: 97.2 F | BODY MASS INDEX: 29.36 KG/M2 | SYSTOLIC BLOOD PRESSURE: 144 MMHG | OXYGEN SATURATION: 96 % | WEIGHT: 209 LBS | RESPIRATION RATE: 16 BRPM | DIASTOLIC BLOOD PRESSURE: 84 MMHG | HEART RATE: 74 BPM

## 2019-06-12 DIAGNOSIS — R10.9 FLANK PAIN: Primary | ICD-10-CM

## 2019-06-12 DIAGNOSIS — R19.8 CHANGE IN BOWEL FUNCTION: ICD-10-CM

## 2019-06-12 DIAGNOSIS — F43.21 ADJUSTMENT DISORDER WITH DEPRESSED MOOD: ICD-10-CM

## 2019-06-12 PROCEDURE — 99214 OFFICE O/P EST MOD 30 MIN: CPT | Performed by: INTERNAL MEDICINE

## 2019-06-12 RX ORDER — PAROXETINE HYDROCHLORIDE HEMIHYDRATE 12.5 MG/1
12.5 TABLET, FILM COATED, EXTENDED RELEASE ORAL EVERY MORNING
Qty: 30 TABLET | Refills: 11 | Status: SHIPPED | OUTPATIENT
Start: 2019-06-12 | End: 2020-04-30

## 2019-06-12 ASSESSMENT — PAIN SCALES - GENERAL: PAINLEVEL: MODERATE PAIN (5)

## 2019-06-12 ASSESSMENT — PATIENT HEALTH QUESTIONNAIRE - PHQ9: SUM OF ALL RESPONSES TO PHQ QUESTIONS 1-9: 13

## 2019-06-12 NOTE — TELEPHONE ENCOUNTER
Left message for patient to call back.  I have him down for 2:45(he can come in a little earlier, like 2:30) today but if the noon spot is still open he can come at that time instead.

## 2019-06-12 NOTE — PROGRESS NOTES
Dylon Barragan is a 69 year old male who presents with recheck, stress nuclear scan was ok.     Right flank and mid back has some pain, did Ua yesterday that was ok.      Symptoms seem to come on with change from clonazepam to primodone.  Felt more back pains and some sob with it as well.  They have tried him on lower dose.       paxil change from XR to immediate release and takes that at night.  Was changed due to cost. Feels more depression as well,      CPAP hasn't been working as well.      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     Complication of anesthesia     half an airway due to stroke hx     Dysphagia      Edema      Essential hypertension, benign      Mixed hyperlipidemia      Other psoriasis      Personal history of unspecified circulatory disease      Sleep apnea     CPAP     Unspecified cerebral artery occlusion with cerebral infarction          Ventral hernia, unspecified, without mention of obstruction or gangrene      Current Outpatient Medications   Medication     albuterol (VENTOLIN HFA) 108 (90 BASE) MCG/ACT Inhaler     ASPIRIN PO     atorvastatin (LIPITOR) 40 MG tablet     baclofen (LIORESAL) 20 MG tablet     indapamide (LOZOL) 1.25 MG tablet     ketoconazole (NIZORAL) 2 % cream     PARoxetine (PAXIL) 10 MG tablet     PARoxetine (PAXIL-CR) 12.5 MG 24 hr tablet     primidone (MYSOLINE) 50 MG tablet     propranolol HCl 60 MG TABS     fexofenadine (ALLEGRA) 180 MG tablet     OTHER MEDICAL SUPPLIES     No current facility-administered medications for this visit.      Social History     Tobacco Use     Smoking status: Former Smoker     Packs/day: 1.00     Years: 20.00     Pack years: 20.00     Types: Cigarettes     Last attempt to quit: 1985     Years since quittin.2     Smokeless tobacco: Never Used     Tobacco comment: spouse smokes outside   Substance Use Topics      Alcohol use: Yes     Alcohol/week: 0.0 oz     Comment: 6 per year     Drug use: No     Review of Systems  Constitutional-No fevers, chills, or weight changes..  Cardiac-No chest pain or palpitations.  Respiratory-No cough, sob, or hemoptysis.  GI-No nausea, vomitting, diarrhea, constipation, or blood in the stool.  Musculoskeletal-neck pains.    Physical Exam  /84   Pulse 74   Temp 97.2  F (36.2  C) (Temporal)   Resp 16   Wt 94.8 kg (209 lb)   SpO2 96%   BMI 29.36 kg/m    General Appearance-healthy, alert, no distress  Cardiac-regular rate and rhythm  with normal S1, S2 ; no murmur, rub or gallops  Lungs-clear to auscultation  Extremities-no peripheral edema, peripheral pulses normal    ASSESSMENT:  69-year-old gentleman who is here with some right flank pain.  Some low back pain and has had some shortness of breath, he had a recent negative nuclear stress test.  He feels some of these changes are from switching to from clonazepam to primidone for his tremors.  He has follow-up in July with neurology.  We did discuss stopping the primidone and see how his symptoms are including how bad his tremors get.    He does have a more depression since switching his Paxil due to insurance will go back to Paxil 12.5 extended release and take it in the morning.    Patient also has some neck pain we will be seeing him again in early July we can discuss it then depending how these other events have affected him.  Hopefully he will do better off the primidone and he is given a written description of weaning it down and stopping it.    Electronically signed by Orville Galeas MD

## 2019-06-12 NOTE — TELEPHONE ENCOUNTER
----- Message from Orville Galeas MD sent at 6/11/2019  4:34 PM CDT -----  Stress test was ok, no ischemia or cause of his symptoms.

## 2019-06-12 NOTE — TELEPHONE ENCOUNTER
Patient was informed that his stress test was ok. There was no ischemia or cause of his symptoms. Patient is scheduled for 7/2/19 at 9:00 am to discuss medications. Patient is wondering if the changes in his medications have to do with his symptoms.     Alejo Forte, [facility]

## 2019-06-12 NOTE — TELEPHONE ENCOUNTER
Reason for Call:  Same Day Appointment, Requested Provider:  Orville Galeas MD    PCP: Orville Galeas    Reason for visit: Patient has been having right sided back pain. Had a UA/UC yesterday and was told it was negative. It is not getting any better. Hoping to get worked in with PCP ASAP to have this addressed.     Duration of symptoms: 2 - 3 weeks getting worse every day    Have you been treated for this in the past? No    Additional comments:     Can we leave a detailed message on this number? YES    Phone number patient can be reached at: Home number on file [phone] (home)    Best Time: any    Call taken on 6/12/2019 at 10:10 AM by Linsey Winston

## 2019-06-13 ENCOUNTER — HOSPITAL ENCOUNTER (OUTPATIENT)
Dept: CT IMAGING | Facility: CLINIC | Age: 69
Discharge: HOME | End: 2019-06-13
Attending: INTERNAL MEDICINE | Admitting: INTERNAL MEDICINE
Payer: COMMERCIAL

## 2019-06-13 DIAGNOSIS — R10.9 FLANK PAIN: ICD-10-CM

## 2019-06-13 DIAGNOSIS — R19.8 CHANGE IN BOWEL FUNCTION: ICD-10-CM

## 2019-06-13 PROCEDURE — 25000125 ZZHC RX 250: Performed by: INTERNAL MEDICINE

## 2019-06-13 PROCEDURE — 74177 CT ABD & PELVIS W/CONTRAST: CPT

## 2019-06-13 PROCEDURE — 25000128 H RX IP 250 OP 636: Performed by: INTERNAL MEDICINE

## 2019-06-13 RX ORDER — IOPAMIDOL 755 MG/ML
500 INJECTION, SOLUTION INTRAVASCULAR ONCE
Status: COMPLETED | OUTPATIENT
Start: 2019-06-13 | End: 2019-06-13

## 2019-06-13 RX ORDER — IOPAMIDOL 755 MG/ML
500 INJECTION, SOLUTION INTRAVASCULAR ONCE
Status: DISCONTINUED | OUTPATIENT
Start: 2019-06-13 | End: 2019-06-13

## 2019-06-13 RX ADMIN — IOPAMIDOL 98 ML: 755 INJECTION, SOLUTION INTRAVENOUS at 12:48

## 2019-06-13 RX ADMIN — SODIUM CHLORIDE 60 ML: 9 INJECTION, SOLUTION INTRAVENOUS at 12:48

## 2019-06-24 DIAGNOSIS — E78.5 HYPERLIPIDEMIA LDL GOAL <130: ICD-10-CM

## 2019-06-25 RX ORDER — ATORVASTATIN CALCIUM 40 MG/1
TABLET, FILM COATED ORAL
Qty: 90 TABLET | Refills: 1 | Status: SHIPPED | OUTPATIENT
Start: 2019-06-25 | End: 2019-12-24

## 2019-06-25 NOTE — TELEPHONE ENCOUNTER
"atorvastatin  Last Written Prescription Date:  5/14/2018  Last Fill Quantity: 90,  # refills: 3   Last office visit: 1/4/2019 with prescribing provider:      Future Office Visit:   Next 5 appointments (look out 90 days)    Jul 02, 2019  9:00 AM CDT  Office Visit with Orville Galeas MD  [facility] ([facility]) [address] [place] [id number] [phone]           Requested Prescriptions   Pending Prescriptions Disp Refills     atorvastatin (LIPITOR) 40 MG tablet [Pharmacy Med Name: ATORVASTATIN CALCIUM 40MG TABS] 90 tablet 3     Sig: TAKE ONE TABLET BY MOUTH EVERY DAY       Statins Protocol Failed - 6/24/2019  8:28 AM        Failed - LDL on file in past 12 months     Recent Labs   Lab Test 03/23/18  0954   LDL 68             Passed - No abnormal creatine kinase in past 12 months     Recent Labs   Lab Test 10/19/16  0932                   Passed - Recent (12 mo) or future (30 days) visit within the authorizing provider's specialty     Patient had office visit in the last 12 months or has a visit in the next 30 days with authorizing provider or within the authorizing provider's specialty.  See \"Patient Info\" tab in inbasket, or \"Choose Columns\" in Meds & Orders section of the refill encounter.              Passed - Medication is active on med list        Passed - Patient is age 18 or older          Routing refill request to provider for review/approval because:  Drug interaction warning              Opal Davis RN on 6/25/2019 at 4:39 PM    "

## 2019-06-27 ENCOUNTER — OFFICE VISIT (OUTPATIENT)
Dept: SLEEP MEDICINE | Facility: CLINIC | Age: 69
End: 2019-06-27
Payer: COMMERCIAL

## 2019-06-27 ENCOUNTER — DOCUMENTATION ONLY (OUTPATIENT)
Dept: SLEEP MEDICINE | Facility: CLINIC | Age: 69
End: 2019-06-27
Payer: COMMERCIAL

## 2019-06-27 VITALS
HEART RATE: 61 BPM | DIASTOLIC BLOOD PRESSURE: 63 MMHG | WEIGHT: 211 LBS | OXYGEN SATURATION: 97 % | HEIGHT: 71 IN | SYSTOLIC BLOOD PRESSURE: 128 MMHG | BODY MASS INDEX: 29.54 KG/M2

## 2019-06-27 DIAGNOSIS — G47.33 OSA (OBSTRUCTIVE SLEEP APNEA): Primary | ICD-10-CM

## 2019-06-27 PROCEDURE — 99214 OFFICE O/P EST MOD 30 MIN: CPT | Performed by: PHYSICIAN ASSISTANT

## 2019-06-27 ASSESSMENT — MIFFLIN-ST. JEOR: SCORE: 1739.22

## 2019-06-27 NOTE — PROGRESS NOTES
Obstructive Sleep Apnea - PAP Follow-Up Visit:    Chief Complaint   Patient presents with     CPAP Follow Up       Dylon Barragan comes in today for follow-up of their moderate sleep apnea, managed with CPAP.      He states his CPAP is about 6 years old and stopped working. The humidifier has not been working for few months before the machine completely stopped working.     Overall, he rates the experience with PAP as 7 (0 poor, 10 great). The mask is comfortable.   The mask is leaking  He is not snoring with the mask on. He is not having gasp arousals.  He is having significant oral/nasal dryness. The pressure is comfortable.     His PAP interface is Full Face Mask.    Bedtime is typically 2330. Usually it takes about 15 min minutes to fall asleep with the mask on. Wake time is typically 0645.  Patient is using PAP therapy 5 hours per night. The patient is usually getting 7 hours of sleep per night.    He does feel rested in the morning.    Total score - Moody: 12 (6/27/2019  8:00 AM)    ResMed   Auto-PAP 6 - 15 cmH2O 30 day usage data:    87% of days with > 4 hours of use. 4/30 days with no use.   Average use 3 hours 56 minutes per day.   95%ile Leak 28.4 L/min.   CPAP 95% pressure 11.8 cm.   AHI 18.8 events per hour. Central index 7.3, obstructive 6.7, unknown 2.9.    Past medical/surgical history, family history, social history, medications and allergies were reviewed.      Problem List:  Patient Active Problem List    Diagnosis Date Noted     Essential hypertension with goal blood pressure less than 130/80 08/30/2016     Priority: Medium     Adjustment disorder with depressed mood 12/22/2015     Priority: Medium     Acute bronchitis 07/09/2015     Priority: Medium     TED (obstructive sleep apnea) 06/25/2013     Priority: Medium     Advanced directives, counseling/discussion 01/23/2012     Priority: Medium     Advance Directive Problem List Overview:   Name Relationship Phone    Primary Health Care Agent     "        Alternative Health Care Agent          Discussed advance care planning with patient; however, patient declined at this time. 1/23/2012          HYPERLIPIDEMIA LDL GOAL <130 10/31/2010     Priority: Medium     GERD (gastroesophageal reflux disease) 09/25/2008     Priority: Medium     Acute, but ill-defined, cerebrovascular disease      Priority: Medium     right brainstem          Ht 1.803 m (5' 11\")   Wt 95.7 kg (211 lb)   BMI 29.43 kg/m      Impression/Plan:  Moderate obstructive sleep apnea-  Tolerating PAP well.  He needs a replacements CPAP and Comprehensive DME order placed.   Mask fitting with replacement CPAP  He will be taught how to adjust humidifier settings.     If the mask is no longer leaking and he continues to have persistent elevated AHI, will consider a titration study.     Dylon Barragan will follow up in about 6 week(s).     Twenty-five minutes spent with patient, all of which were spent face-to-face counseling, consulting, coordinating plan of care regarding TED.      Jennifer Avila PA-C    "

## 2019-06-27 NOTE — PATIENT INSTRUCTIONS
Your BMI is Body mass index is 29.43 kg/m .  Weight management is a personal decision.  If you are interested in exploring weight loss strategies, the following discussion covers the approaches that may be successful. Body mass index (BMI) is one way to tell whether you are at a healthy weight, overweight, or obese. It measures your weight in relation to your height.  A BMI of 18.5 to 24.9 is in the healthy range. A person with a BMI of 25 to 29.9 is considered overweight, and someone with a BMI of 30 or greater is considered obese. More than two-thirds of American adults are considered overweight or obese.  Being overweight or obese increases the risk for further weight gain. Excess weight may lead to heart disease and diabetes.  Creating and following plans for healthy eating and physical activity may help you improve your health.  Weight control is part of healthy lifestyle and includes exercise, emotional health, and healthy eating habits. Careful eating habits lifelong are the mainstay of weight control. Though there are significant health benefits from weight loss, long-term weight loss with diet alone may be very difficult to achieve- studies show long-term success with dietary management in less than 10% of people. Attaining a healthy weight may be especially difficult to achieve in those with severe obesity. In some cases, medications, devices and surgical management might be considered.  What can you do?  If you are overweight or obese and are interested in methods for weight loss, you should discuss this with your provider.     Consider reducing daily calorie intake by 500 calories.     Keep a food journal.     Avoiding skipping meals, consider cutting portions instead.    Diet combined with exercise helps maintain muscle while optimizing fat loss. Strength training is particularly important for building and maintaining muscle mass. Exercise helps reduce stress, increase energy, and improves fitness.  Increasing exercise without diet control, however, may not burn enough calories to loose weight.       Start walking three days a week 10-20 minutes at a time    Work towards walking thirty minutes five days a week     Eventually, increase the speed of your walking for 1-2 minutes at time    In addition, we recommend that you review healthy lifestyles and methods for weight loss available through the National Institutes of Health patient information sites:  http://win.niddk.nih.gov/publications/index.htm    And look into health and wellness programs that may be available through your health insurance provider, employer, local community center, or sheila club.    Weight management plan: Patient was referred to their PCP to discuss a diet and exercise plan.

## 2019-06-27 NOTE — PROGRESS NOTES
Patient was offered choice of vendor and chose [facility].  Patient Dylon Barragan was set up at [place] on June 27, 2019. Patient received a Resmed AirSense 10 Auto. Pressures were set at 6-15 cm H2O.   Patient s ramp is 5 cm H2O for Off and FLEX/EPR is EPR.  Patient received a Resmed Mask name: AIRFIT F30  Full Face mask size Small, heated tubing and heated humidifier.  Patient is not enrolled in the STM Program and does need to meet compliance. Patient has a follow up on 8/29/19 with Dr. Galeano.    Joya Gutierrez

## 2019-06-27 NOTE — NURSING NOTE
"Chief Complaint   Patient presents with     CPAP Follow Up       Initial /63   Pulse 61   Ht 1.803 m (5' 11\")   Wt 95.7 kg (211 lb)   SpO2 97%   BMI 29.43 kg/m   Estimated body mass index is 29.43 kg/m  as calculated from the following:    Height as of this encounter: 1.803 m (5' 11\").    Weight as of this encounter: 95.7 kg (211 lb).    Medication Reconciliation: complete      "

## 2019-07-02 ENCOUNTER — TELEPHONE (OUTPATIENT)
Dept: INTERNAL MEDICINE | Facility: CLINIC | Age: 69
End: 2019-07-02

## 2019-07-02 ENCOUNTER — OFFICE VISIT (OUTPATIENT)
Dept: INTERNAL MEDICINE | Facility: CLINIC | Age: 69
End: 2019-07-02
Payer: COMMERCIAL

## 2019-07-02 VITALS
RESPIRATION RATE: 16 BRPM | BODY MASS INDEX: 29.29 KG/M2 | TEMPERATURE: 96.2 F | SYSTOLIC BLOOD PRESSURE: 138 MMHG | HEART RATE: 66 BPM | WEIGHT: 210 LBS | OXYGEN SATURATION: 98 % | DIASTOLIC BLOOD PRESSURE: 84 MMHG

## 2019-07-02 DIAGNOSIS — G47.33 OSA (OBSTRUCTIVE SLEEP APNEA): ICD-10-CM

## 2019-07-02 DIAGNOSIS — R25.1 TREMOR: ICD-10-CM

## 2019-07-02 DIAGNOSIS — I10 ESSENTIAL HYPERTENSION WITH GOAL BLOOD PRESSURE LESS THAN 130/80: ICD-10-CM

## 2019-07-02 DIAGNOSIS — F43.21 ADJUSTMENT DISORDER WITH DEPRESSED MOOD: Primary | ICD-10-CM

## 2019-07-02 DIAGNOSIS — Z29.8 * * * SBE PROPHYLAXIS * * *: ICD-10-CM

## 2019-07-02 PROCEDURE — 99214 OFFICE O/P EST MOD 30 MIN: CPT | Performed by: INTERNAL MEDICINE

## 2019-07-02 RX ORDER — LISINOPRIL 10 MG/1
10 TABLET ORAL DAILY
Qty: 30 TABLET | Refills: 3 | Status: SHIPPED | OUTPATIENT
Start: 2019-07-02 | End: 2019-07-15

## 2019-07-02 RX ORDER — AMOXICILLIN 400 MG/5ML
POWDER, FOR SUSPENSION ORAL
Qty: 25 ML | Refills: 1 | Status: SHIPPED | OUTPATIENT
Start: 2019-07-02 | End: 2019-12-02

## 2019-07-02 ASSESSMENT — PAIN SCALES - GENERAL: PAINLEVEL: NO PAIN (0)

## 2019-07-02 NOTE — PROGRESS NOTES
Subjective     Dylon Barragan is a 69 year old male who presents to clinic today for the following health issues:    HPI   Chief Complaint   Patient presents with     RECHECK     breathing - not feeling right     Depression     f/u     Was seen on June 12th, still some things going on.    Harder to breath at times, feels he stops breathing at times.      Voice is weaker at times, tired all the time.  CPAP broke, has a new one.  Better not as dried out.  Sleeping more with new cpap, 7-9 hours.      Some low ambition, tired, not doing as much.      We tried him off primodone and then went back on it.  Back to neurology on July 11th.      Fogginess in his head, clears up at 6 PM.      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     Complication of anesthesia     half an airway due to stroke hx     Dysphagia      Edema      Essential hypertension, benign      Mixed hyperlipidemia      Other psoriasis      Personal history of unspecified circulatory disease      Sleep apnea     CPAP     Unspecified cerebral artery occlusion with cerebral infarction     1999     Ventral hernia, unspecified, without mention of obstruction or gangrene      Current Outpatient Medications   Medication     albuterol (VENTOLIN HFA) 108 (90 BASE) MCG/ACT Inhaler     amoxicillin (AMOXIL) 400 MG/5ML suspension     ASPIRIN PO     atorvastatin (LIPITOR) 40 MG tablet     baclofen (LIORESAL) 20 MG tablet     fexofenadine (ALLEGRA) 180 MG tablet     indapamide (LOZOL) 1.25 MG tablet     ketoconazole (NIZORAL) 2 % cream     lisinopril (PRINIVIL/ZESTRIL) 10 MG tablet     order for DME     OTHER MEDICAL SUPPLIES     PARoxetine (PAXIL-CR) 12.5 MG 24 hr tablet     primidone (MYSOLINE) 50 MG tablet     propranolol HCl 60 MG TABS     No current facility-administered medications for this visit.      Social History     Tobacco Use     Smoking  status: Former Smoker     Packs/day: 1.00     Years: 20.00     Pack years: 20.00     Types: Cigarettes     Last attempt to quit: 1985     Years since quittin.2     Smokeless tobacco: Never Used     Tobacco comment: spouse smokes outside   Substance Use Topics     Alcohol use: Yes     Alcohol/week: 0.0 oz     Comment: 6 per year     Drug use: No     Physical Exam  /84   Pulse 66   Temp 96.2  F (35.7  C) (Temporal)   Resp 16   Wt 95.3 kg (210 lb)   SpO2 98%   BMI 29.29 kg/m    General Appearance-healthy, alert, no distress  Slight tremor, better then last time.      ASSESSMENT:  Patient with multiple things going on, he has had some increased fogginess, tremors, swallow issues after his stroke.  Had a long history of hypertension, he has obstructive sleep apnea he does get a new CPAP machine.  He is sleeping more the last 3 to 4 days since he had it.  He also has a history of depression we had to change his Paxil due to insurance but then went back to the controlled release multiple medication changes.    Problem #1 hypertension I want to change him off of propranolol which was tried for tremors but he is better with tremors on primidone.  We will stop the propranolol and put him on lisinopril 10 mg.  Problem #2 some dental prophylaxis I did give him liquid amoxicillin today  Problem #3 tremors he will continue the primidone he will see neurology in 11 days.  Problem #4 fogginess and decreased cognition he will try cutting back the baclofen in 1 week to see if that makes a difference.  Problem #5 abnormal sound in his ears he may see ENT in the future we reviewed his MR angiogram from 2016 as being normal.  He also has some vocal changes and decreased ability and swallowing may need to see the for that as well.  Currently is scheduled with Dr. Sage of [facility] nose and throat but that is set up for sleep in August I would like to see him back in early August to review these changes.    I spent greater  than 50% of this 30 minute visit in counseling and coordination of care of above issues.    Electronically signed by Orville Galeas MD

## 2019-07-02 NOTE — TELEPHONE ENCOUNTER
Do you want Dylon to wean off the Propranolol or is he to just stop it?     -Michelle Garcia, Pharm.D., [facility], [phone]    Ok to do half dose bid for 3 days then stop it.

## 2019-07-08 DIAGNOSIS — J45.909 MILD ASTHMA WITHOUT COMPLICATION, UNSPECIFIED WHETHER PERSISTENT: ICD-10-CM

## 2019-07-09 ENCOUNTER — HOSPITAL ENCOUNTER (EMERGENCY)
Facility: CLINIC | Age: 69
Discharge: HOME | End: 2019-07-09
Attending: FAMILY MEDICINE | Admitting: FAMILY MEDICINE
Payer: COMMERCIAL

## 2019-07-09 ENCOUNTER — APPOINTMENT (OUTPATIENT)
Dept: CT IMAGING | Facility: CLINIC | Age: 69
End: 2019-07-09
Attending: FAMILY MEDICINE
Payer: COMMERCIAL

## 2019-07-09 ENCOUNTER — TELEPHONE (OUTPATIENT)
Dept: EMERGENCY MEDICINE | Facility: CLINIC | Age: 69
End: 2019-07-09

## 2019-07-09 VITALS
SYSTOLIC BLOOD PRESSURE: 122 MMHG | BODY MASS INDEX: 29.29 KG/M2 | OXYGEN SATURATION: 96 % | HEART RATE: 78 BPM | WEIGHT: 210 LBS | TEMPERATURE: 96 F | RESPIRATION RATE: 16 BRPM | DIASTOLIC BLOOD PRESSURE: 65 MMHG

## 2019-07-09 DIAGNOSIS — I10 ESSENTIAL HYPERTENSION WITH GOAL BLOOD PRESSURE LESS THAN 130/80: Chronic | ICD-10-CM

## 2019-07-09 DIAGNOSIS — F41.9 ANXIETY: ICD-10-CM

## 2019-07-09 DIAGNOSIS — R06.00 DYSPNEA, UNSPECIFIED TYPE: ICD-10-CM

## 2019-07-09 DIAGNOSIS — I31.39 PERICARDIAL EFFUSION: ICD-10-CM

## 2019-07-09 LAB
ALBUMIN SERPL-MCNC: 3.8 G/DL (ref 3.4–5)
ALP SERPL-CCNC: 112 U/L (ref 40–150)
ALT SERPL W P-5'-P-CCNC: 42 U/L (ref 0–70)
ANION GAP SERPL CALCULATED.3IONS-SCNC: 7 MMOL/L (ref 3–14)
AST SERPL W P-5'-P-CCNC: 25 U/L (ref 0–45)
BASE EXCESS BLDV CALC-SCNC: 5.6 MMOL/L
BASOPHILS # BLD AUTO: 0 10E9/L (ref 0–0.2)
BASOPHILS NFR BLD AUTO: 0.4 %
BILIRUB SERPL-MCNC: 1.2 MG/DL (ref 0.2–1.3)
BUN SERPL-MCNC: 11 MG/DL (ref 7–30)
CALCIUM SERPL-MCNC: 8.9 MG/DL (ref 8.5–10.1)
CHLORIDE SERPL-SCNC: 98 MMOL/L (ref 94–109)
CO2 SERPL-SCNC: 31 MMOL/L (ref 20–32)
CREAT SERPL-MCNC: 0.75 MG/DL (ref 0.66–1.25)
D DIMER PPP FEU-MCNC: <0.3 UG/ML FEU (ref 0–0.5)
DIFFERENTIAL METHOD BLD: ABNORMAL
EOSINOPHIL NFR BLD AUTO: 0.5 %
ERYTHROCYTE [DISTWIDTH] IN BLOOD BY AUTOMATED COUNT: 13.6 % (ref 10–15)
GFR SERPL CREATININE-BSD FRML MDRD: >90 ML/MIN/{1.73_M2}
GLUCOSE SERPL-MCNC: 151 MG/DL (ref 70–99)
HCO3 BLDV-SCNC: 30 MMOL/L (ref 21–28)
HCT VFR BLD AUTO: 44.7 % (ref 40–53)
HGB BLD-MCNC: 15.6 G/DL (ref 13.3–17.7)
IMM GRANULOCYTES # BLD: 0 10E9/L (ref 0–0.4)
IMM GRANULOCYTES NFR BLD: 0.2 %
LYMPHOCYTES # BLD AUTO: 1.5 10E9/L (ref 0.8–5.3)
LYMPHOCYTES NFR BLD AUTO: 17.3 %
MCH RBC QN AUTO: 30.8 PG (ref 26.5–33)
MCHC RBC AUTO-ENTMCNC: 34.9 G/DL (ref 31.5–36.5)
MCV RBC AUTO: 88 FL (ref 78–100)
MONOCYTES # BLD AUTO: 0.7 10E9/L (ref 0–1.3)
MONOCYTES NFR BLD AUTO: 8.7 %
NEUTROPHILS # BLD AUTO: 6.1 10E9/L (ref 1.6–8.3)
NEUTROPHILS NFR BLD AUTO: 72.9 %
NRBC # BLD AUTO: 0 10*3/UL
NRBC BLD AUTO-RTO: 0 /100
NT-PROBNP SERPL-MCNC: 179 PG/ML (ref 0–900)
O2/TOTAL GAS SETTING VFR VENT: 21 %
PCO2 BLDV: 42 MM HG (ref 40–50)
PH BLDV: 7.47 PH (ref 7.32–7.43)
PLATELET # BLD AUTO: 128 10E9/L (ref 150–450)
PO2 BLDV: 37 MM HG (ref 25–47)
POTASSIUM SERPL-SCNC: 3.4 MMOL/L (ref 3.4–5.3)
PROT SERPL-MCNC: 7.6 G/DL (ref 6.8–8.8)
RBC # BLD AUTO: 5.06 10E12/L (ref 4.4–5.9)
SODIUM SERPL-SCNC: 136 MMOL/L (ref 133–144)
TROPONIN I SERPL-MCNC: <0.015 UG/L (ref 0–0.04)
WBC # BLD AUTO: 8.4 10E9/L (ref 4–11)

## 2019-07-09 PROCEDURE — 84484 ASSAY OF TROPONIN QUANT: CPT | Performed by: FAMILY MEDICINE

## 2019-07-09 PROCEDURE — 99285 EMERGENCY DEPT VISIT HI MDM: CPT | Mod: 25 | Performed by: FAMILY MEDICINE

## 2019-07-09 PROCEDURE — 85379 FIBRIN DEGRADATION QUANT: CPT | Performed by: FAMILY MEDICINE

## 2019-07-09 PROCEDURE — 25000128 H RX IP 250 OP 636: Performed by: FAMILY MEDICINE

## 2019-07-09 PROCEDURE — 85025 COMPLETE CBC W/AUTO DIFF WBC: CPT | Performed by: FAMILY MEDICINE

## 2019-07-09 PROCEDURE — 93010 ELECTROCARDIOGRAM REPORT: CPT | Mod: Z6 | Performed by: FAMILY MEDICINE

## 2019-07-09 PROCEDURE — 80053 COMPREHEN METABOLIC PANEL: CPT | Performed by: FAMILY MEDICINE

## 2019-07-09 PROCEDURE — 83880 ASSAY OF NATRIURETIC PEPTIDE: CPT | Performed by: FAMILY MEDICINE

## 2019-07-09 PROCEDURE — 25000125 ZZHC RX 250: Performed by: FAMILY MEDICINE

## 2019-07-09 PROCEDURE — 71260 CT THORAX DX C+: CPT

## 2019-07-09 PROCEDURE — 96374 THER/PROPH/DIAG INJ IV PUSH: CPT | Performed by: FAMILY MEDICINE

## 2019-07-09 PROCEDURE — 93005 ELECTROCARDIOGRAM TRACING: CPT | Performed by: FAMILY MEDICINE

## 2019-07-09 PROCEDURE — 82803 BLOOD GASES ANY COMBINATION: CPT | Performed by: FAMILY MEDICINE

## 2019-07-09 RX ORDER — IOPAMIDOL 755 MG/ML
100 INJECTION, SOLUTION INTRAVASCULAR ONCE
Status: COMPLETED | OUTPATIENT
Start: 2019-07-09 | End: 2019-07-09

## 2019-07-09 RX ORDER — LORAZEPAM 2 MG/ML
1 INJECTION INTRAMUSCULAR ONCE
Status: COMPLETED | OUTPATIENT
Start: 2019-07-09 | End: 2019-07-09

## 2019-07-09 RX ORDER — ALBUTEROL SULFATE 90 UG/1
AEROSOL, METERED RESPIRATORY (INHALATION)
Qty: 18 G | Refills: 1 | Status: SHIPPED | OUTPATIENT
Start: 2019-07-09 | End: 2020-11-09

## 2019-07-09 RX ADMIN — IOPAMIDOL 85 ML: 755 INJECTION, SOLUTION INTRAVENOUS at 05:36

## 2019-07-09 RX ADMIN — SODIUM CHLORIDE 60 ML: 9 INJECTION, SOLUTION INTRAVENOUS at 05:37

## 2019-07-09 RX ADMIN — LORAZEPAM 1 MG: 2 INJECTION INTRAMUSCULAR; INTRAVENOUS at 04:29

## 2019-07-09 ASSESSMENT — ENCOUNTER SYMPTOMS: UNEXPECTED WEIGHT CHANGE: 1

## 2019-07-09 NOTE — ED PROVIDER NOTES
History     Chief Complaint   Patient presents with     Shortness of Breath     HPI  Dylon Barragan is a 69 year old male who presents to the ED with increasing shortness of breath that is progressive since about May of this year.  Initially saw Dr. Galeas on 5/21/2019 with dyspnea on exertion.  Has been in several times since then and they are still trying to sort things out.  Stress echo was nondiagnostic so a Lexiscan was performed which was normal.  No decreased cardiac perfusion and an ejection fraction of greater than 70%.     Has been dealing with depression and anxiety and switching medications around.  Finally back on his previous medication due to cost which is Paxil extended release.    Switched off of propranolol back to primidone for tremors as the primidone seemed to work better.  They put him on 10 mg of lisinopril when they took him off the propranolol. This was on 7/2/2019.      6/12/19, noted that he had acceleration of his symptoms when he switched from Klonopin to primidone.    Has a new CPAP machine.    Voice seems weak and tired all the time.  More tired, low ambition not doing as much.  Has a neurology follow-up this week for his tremors.  They tried him off primidone but then he went back on it because it worked better than propranolol.    Some chest heaviness.  Has not noticed any wheezing.  No fevers chills or sweats.  Feet and hands feel cold at times.  Feels like he is got stuff in his chest that he is trying to cough up but is unable to expectorate.              Allergies:  Allergies   Allergen Reactions     No Known Allergies        Problem List:    Patient Active Problem List    Diagnosis Date Noted     Essential hypertension with goal blood pressure less than 130/80 08/30/2016     Priority: Medium     Adjustment disorder with depressed mood 12/22/2015     Priority: Medium     Acute bronchitis 07/09/2015     Priority: Medium     TED (obstructive sleep apnea) 06/25/2013      Priority: Medium     Advanced directives, counseling/discussion 01/23/2012     Priority: Medium     Advance Directive Problem List Overview:   Name Relationship Phone    Primary Health Care Agent            Alternative Health Care Agent          Discussed advance care planning with patient; however, patient declined at this time. 1/23/2012          HYPERLIPIDEMIA LDL GOAL <130 10/31/2010     Priority: Medium     GERD (gastroesophageal reflux disease) 09/25/2008     Priority: Medium     Acute, but ill-defined, cerebrovascular disease      Priority: Medium     right brainstem          Past Medical History:    Past Medical History:   Diagnosis Date     Acute, but ill-defined, cerebrovascular disease 7/99     Complication of anesthesia      Dysphagia      Edema      Essential hypertension, benign      Mixed hyperlipidemia      Other psoriasis      Personal history of unspecified circulatory disease      Sleep apnea      Unspecified cerebral artery occlusion with cerebral infarction      Ventral hernia, unspecified, without mention of obstruction or gangrene        Past Surgical History:    Past Surgical History:   Procedure Laterality Date     C APPENDECTOMY       C NONSPECIFIC PROCEDURE      previous PEG from CVA     C NONSPECIFIC PROCEDURE      incisional hernia repair from PEG site     COLONOSCOPY  05/22/2006     COLONOSCOPY N/A 12/2/2016    Procedure: COLONOSCOPY;  Surgeon: Mj Mcfarland MD;  Location:  GI     REPAIR PTOSIS  10/18/2013    Procedure: REPAIR PTOSIS;  LEFT UPPER LID PTOSIS REPAIR;  Surgeon: Bienvenido Alejandra MD;  Location:  [facility]     REPAIR PTOSIS BILATERAL  8/23/2013    Procedure: REPAIR PTOSIS BILATERAL;  BILATERAL UPPER LID PTOSIS AND MECHANICAL PTOSIS REPAIR ;  Surgeon: Bienvenido Alejandra MD;  Location:  [facility]     TONSILLECTOMY & ADENOIDECTOMY         Family History:    Family History   Problem Relation Age of Onset     C.A.D. Father      Diabetes Father      Hypertension Father       GLADYS Paternal Grandfather      GLADYS Paternal Uncle      Cancer Mother         skin     Heart Disease Sister         valve replacement       Social History:  Marital Status:   [2]  Social History     Tobacco Use     Smoking status: Former Smoker     Packs/day: 1.00     Years: 20.00     Pack years: 20.00     Types: Cigarettes     Last attempt to quit: 1985     Years since quittin.2     Smokeless tobacco: Never Used     Tobacco comment: spouse smokes outside   Substance Use Topics     Alcohol use: Yes     Alcohol/week: 0.0 oz     Comment: 6 per year     Drug use: No        Medications:      albuterol (VENTOLIN HFA) 108 (90 BASE) MCG/ACT Inhaler   amoxicillin (AMOXIL) 400 MG/5ML suspension   ASPIRIN PO   atorvastatin (LIPITOR) 40 MG tablet   baclofen (LIORESAL) 20 MG tablet   fexofenadine (ALLEGRA) 180 MG tablet   indapamide (LOZOL) 1.25 MG tablet   ketoconazole (NIZORAL) 2 % cream   lisinopril (PRINIVIL/ZESTRIL) 10 MG tablet   order for DME   OTHER MEDICAL SUPPLIES   PARoxetine (PAXIL-CR) 12.5 MG 24 hr tablet   primidone (MYSOLINE) 50 MG tablet   propranolol HCl 60 MG TABS         Review of Systems   Constitutional: Positive for unexpected weight change ( states he lost 10# in the past week.).   All other systems reviewed and are negative.      Physical Exam   BP: (!) 199/124  Pulse: 93  Heart Rate: 89  Temp: 96  F (35.6  C)  Resp: 16  Weight: 95.3 kg (210 lb)  SpO2: 95 %      Physical Exam   Constitutional: He is oriented to person, place, and time. He appears well-developed and well-nourished. He appears distressed (mild, anxious).   HENT:   Mouth/Throat: Oropharynx is clear and moist.   Eyes: Pupils are equal, round, and reactive to light. EOM are normal.   Cardiovascular: Normal rate and regular rhythm.   Pulmonary/Chest: Effort normal and breath sounds normal. No respiratory distress.   Abdominal: Soft. There is no tenderness.   Musculoskeletal: Normal range of motion. He exhibits no edema or  tenderness.   Neurological: He is alert and oriented to person, place, and time. No cranial nerve deficit.   Skin: Skin is warm and dry.   Psychiatric: His mood appears anxious ( mild).       ED Course  (with Medical Decision Making)    69-year-old gentleman with dyspnea on exertion and at rest for close to 2 months.  He has had an extensive cardiac work-up and had a negative Lexiscan in June of this year.  They have been changing his medications around in regards to his tremors and anxiety as well.  Complains of some chest heaviness and tightness at times.  Get a new CPAP machine.  Sometimes feels like he has stuff in his chest that he just cannot cough out.  No fevers chills or sweats but does claim a 10 pound weight loss in the past week.  No swelling in the legs.    EKG is unremarkable showing no acute ischemic changes.    IV placed.  Labs drawn.  Will await his d-dimer results and decide on chest x-ray versus chest CT but I am leaning towards the latter, especially in light of his weight loss.  Seems a bit anxious and that might be playing into his shortness of breath so he was given Ativan 1 mg IV while waiting for things to come back.         ED Course as of Jul 09 0637   Tue Jul 09, 2019   0439 BP has come down nicely to 149/68 after the Ativan.      0501 CBC was unremarkable.  Comprehensive profile was normal except his glucose is up slightly at 151.  Troponin was undetectable and d-dimer was negative at less than 3.  VBG shows a pH 7.47, PCO2 42, PO2 37, bicarb 30 on room air.  He is just very slightly alkalotic his bicarb is up just a touch.  Possible that he could be a mild CO2 retainer and now blowing his CO2 down to low normal with his anxiety.  I am going to go ahead and get a CT of his chest rather than just repeating a chest x-ray in light of his 10 pound weight loss that he states in the past week or so.  Not concerned about PE with his normal d-dimer but will look for any subtle infiltrates,  interstitial disease, lymphadenopathy etc.          0635 CT showed no evidence of infiltrate, interstitial disease, tumors or lymphadenopathy.  Small pericardial effusion was noted.  I did do a limited bedside ultrasound of the heart and he does have a tiny pericardial effusion but it is certainly not constricting or limiting his cardiac function whatsoever.  He has excellent squeeze and no evidence of cardiac tamponade.  I discussed all of these results with he and his wife.  He is feeling much better after the Ativan.  I think anxiety is playing a significant role in his symptomatology.  He says an extensive work-up which is been essentially unrevealing.  He is going to see neurology in the 2 days for medication adjustments in regards to his tremors I believe.  I asked him to follow-up in clinic with Dr. Galeas to recheck his blood pressure.  I sent Dr. Galeas and note to let him know that he was in.  Certainly could go up on his lisinopril from 10 mg daily up to 20 mg daily but will leave that up to Dr. Galeas.  May need to explore antianxiety medications as well but since he is already on a bunch of medications, will also leave that up to Dr. Galeas so we do not get too many cooks in the kitchen.  He and his wife are comfortable with this plan relieved that we are not finding anything significant as far as his heart and lung function goes.        Procedures               EKG Interpretation:      Interpreted by Rigoberto Lentz Erp  Time reviewed: 4:19 AM;   Symptoms at time of EKG: dyspnea   Rhythm: normal sinus   Rate: normal  Axis: normal  Ectopy: none  Conduction: normal  ST Segments/ T Waves: No ST-T wave changes  Q Waves: none  Comparison to prior: Unchanged from 12/28/2016    Clinical Impression: normal EKG          Critical Care time:  none               Results for orders placed or performed during the hospital encounter of 07/09/19 (from the past 24 hour(s))   CBC with platelets differential   Result  Value Ref Range    WBC 8.4 4.0 - 11.0 10e9/L    RBC Count 5.06 4.4 - 5.9 10e12/L    Hemoglobin 15.6 13.3 - 17.7 g/dL    Hematocrit 44.7 40.0 - 53.0 %    MCV 88 78 - 100 fl    MCH 30.8 26.5 - 33.0 pg    MCHC 34.9 31.5 - 36.5 g/dL    RDW 13.6 10.0 - 15.0 %    Platelet Count 128 (L) 150 - 450 10e9/L    Diff Method Automated Method     % Neutrophils 72.9 %    % Lymphocytes 17.3 %    % Monocytes 8.7 %    % Eosinophils 0.5 %    % Basophils 0.4 %    % Immature Granulocytes 0.2 %    Nucleated RBCs 0 0 /100    Absolute Neutrophil 6.1 1.6 - 8.3 10e9/L    Absolute Lymphocytes 1.5 0.8 - 5.3 10e9/L    Absolute Monocytes 0.7 0.0 - 1.3 10e9/L    Absolute Basophils 0.0 0.0 - 0.2 10e9/L    Abs Immature Granulocytes 0.0 0 - 0.4 10e9/L    Absolute Nucleated RBC 0.0    Comprehensive metabolic panel   Result Value Ref Range    Sodium 136 133 - 144 mmol/L    Potassium 3.4 3.4 - 5.3 mmol/L    Chloride 98 94 - 109 mmol/L    Carbon Dioxide 31 20 - 32 mmol/L    Anion Gap 7 3 - 14 mmol/L    Glucose 151 (H) 70 - 99 mg/dL    Urea Nitrogen 11 7 - 30 mg/dL    Creatinine 0.75 0.66 - 1.25 mg/dL    GFR Estimate >90 >60 mL/min/[1.73_m2]    GFR Estimate If Black >90 >60 mL/min/[1.73_m2]    Calcium 8.9 8.5 - 10.1 mg/dL    Bilirubin Total 1.2 0.2 - 1.3 mg/dL    Albumin 3.8 3.4 - 5.0 g/dL    Protein Total 7.6 6.8 - 8.8 g/dL    Alkaline Phosphatase 112 40 - 150 U/L    ALT 42 0 - 70 U/L    AST 25 0 - 45 U/L   D dimer quantitative   Result Value Ref Range    D Dimer <0.3 0.0 - 0.50 ug/ml FEU   Troponin I   Result Value Ref Range    Troponin I ES <0.015 0.000 - 0.045 ug/L   Nt probnp inpatient   Result Value Ref Range    N-Terminal Pro BNP Inpatient 179 0 - 900 pg/mL   Blood gas venous   Result Value Ref Range    Ph Venous 7.47 (H) 7.32 - 7.43 pH    PCO2 Venous 42 40 - 50 mm Hg    PO2 Venous 37 25 - 47 mm Hg    Bicarbonate Venous 30 (H) 21 - 28 mmol/L    Base Excess Venous 5.6 mmol/L    FIO2 21    CT CHEST W CONTRAST    Narrative    CT CHEST W CONTRAST   7/9/2019 5:45 AM    HISTORY: Dyspnea, chronic. Negative or nondiagnostic x-ray. Weight  loss.    TECHNIQUE: Scans obtained from the apices through the diaphragm with  IV contrast. 85 mL Isovue-370 injected. Radiation dose for this scan  was reduced using automated exposure control, adjustment of the mA  and/or kV according to patient size, or iterative reconstruction  technique.    COMPARISON: None.    FINDINGS:  There is a curvilinear probable subpleural scar in the  right middle lobe laterally. Mild dependent atelectasis bilaterally.  Scarring at the lung bases. No pneumothorax or pleural effusion.  Tracheobronchial tree is unremarkable. There is no mediastinal, hilar  or axillary lymph node enlargement. The heart size is normal. There is  a small pericardial effusion. Images through the upper abdomen show  mild splenomegaly. There is atherosclerotic calcification of the  aorta. No aneurysm. Degenerative disease in the spine.      Impression    IMPRESSION:  1. Small pericardial effusion.  2. No other acute chest abnormality.  3. Mild splenomegaly.    JENI PADILLA MD       Medications   LORazepam (ATIVAN) injection 1 mg (1 mg Intravenous Given 7/9/19 0429)   iopamidol (ISOVUE-370) solution 100 mL (85 mLs Intravenous Given 7/9/19 0536)   Sodium Chloride 0.9 % bag 100mL for CT scan (60 mLs Intravenous Given 7/9/19 0537)   sodium chloride (PF) 0.9% PF flush 3 mL (10 mLs Intracatheter Given 7/9/19 0536)       Assessments & Plan     I have reviewed the nursing notes.    I have reviewed the findings, diagnosis, plan and need for follow up with the patient.          Medication List      There are no discharge medications for this visit.         Final diagnoses:   Anxiety   Dyspnea, unspecified type   Pericardial effusion - small, no tamponade   Essential hypertension with goal blood pressure less than 130/80       7/9/2019   [facility] EMERGENCY DEPARTMENT     Rigoberto Benedict MD  07/09/19 0627

## 2019-07-09 NOTE — ED TRIAGE NOTES
"Pt presents with shortness of breath associated with chest tightness. Pt states that his medications have recently been adjusted. His paxil and he was weaned off of propranolol. Pt isn't sure if he is anxious because of a medical issue or because of the recent medication change. Pt states \"I don't feel right in my head.' Denies suicidal thoughts. Pt states he has been short of breath since May intermittently. Pt see's Dr. Galeas. Pt had stroke \"20 years ago,brain stem.\"   "

## 2019-07-09 NOTE — ED AVS SNAPSHOT
[facility]  [address] [place] [address]  Phone:  [phone]  Fax:  [phone]                                    Dylon Barragan   MRN: [id number]    Department:  [facility]   Date of Visit:  7/9/2019           After Visit Summary Signature Page    I have received my discharge instructions, and my questions have been answered. I have discussed any challenges I see with this plan with the nurse or doctor.    ..........................................................................................................................................  Patient/Patient Representative Signature      ..........................................................................................................................................  Patient Representative Print Name and Relationship to Patient    ..................................................               ................................................  Date                                   Time    ..........................................................................................................................................  Reviewed by Signature/Title    ...................................................              ..............................................  Date                                               Time          22EPIC Rev 08/18

## 2019-07-09 NOTE — DISCHARGE INSTRUCTIONS
Follow-up with Dr. Ulloa on Thursday as scheduled and with Dr. Galeas within the next week to recheck your blood pressure and adjust your medications.  Your EKG, blood work and CT scan were reassuring this morning.  I think anxiety is playing a role in your symptoms.  The Ativan seemed to help.  They may need to adjust your medications.  Please be patient until they find the right combination that works for you.  I sent Dr. Galeas a note to let him know that you were in this morning and what we found.  It was nice visiting with both of you.  I am glad you are feeling at least a little bit better and hope you continue to improve.    Take your medications when you get home.    Thank you for choosing [facility]. We appreciate the opportunity to meet your urgent medical needs. Please let us know if we could have done anything to make your stay more satisfying.    After discharge, please closely monitor for any new or worsening symptoms. Return to the Emergency Department if you develop any acute worsening signs or symptoms.    If you received an opiate pain medication or sedative during your visit, please do not drive for at least 8 hours.     If you had lab work, cultures or imaging studies done during your stay, the final results may still be pending. We will call you if your plan of care needs to change. However, if you are not improving as expected, please follow up with your primary care provider or clinic.     Start any prescription medications that were prescribed to you and take them as directed.     Please see additional handouts that may be pertinent to your condition.

## 2019-07-09 NOTE — TELEPHONE ENCOUNTER
"albuterol  Last Written Prescription Date:  12/12/2017  Last Fill Quantity: 18 g,  # refills: 1   Last office visit: 1/4/2019 with prescribing provider:      Future Office Visit:   Next 5 appointments (look out 90 days)    Jul 15, 2019 11:00 AM CDT  Office Visit with Orville [facility]as MD  [facility] [address] [place] [id number] [phone]           Requested Prescriptions   Pending Prescriptions Disp Refills     albuterol (VENTOLIN HFA) 108 (90 Base) MCG/ACT inhaler [Pharmacy Med Name: VENTOLIN HFA 108MCG/ACT AERS] 18 g 1     Sig: INHALE 2 PUFFS BY MOUTH EVERY 6 HOURS AS NEEDED FOR SHORTNESS OF BREATH /DYSPNEA       Asthma Maintenance Inhalers - Anticholinergics Passed - 7/8/2019  8:59 AM        Passed - Patient is age 12 years or older        Passed - Asthma control assessment score within normal limits in last 6 months     Please review ACT score.           Passed - Medication is active on med list        Passed - Recent (6 mo) or future (30 days) visit within the authorizing provider's specialty     Patient had office visit in the last 6 months or has a visit in the next 30 days with authorizing provider or within the authorizing provider's specialty.  See \"Patient Info\" tab in inbasket, or \"Choose Columns\" in Meds & Orders section of the refill encounter.              Routing refill request to provider for review/approval because:  A break in medication    Opal Davis RN on 7/9/2019 at 12:20 PM    "

## 2019-07-09 NOTE — TELEPHONE ENCOUNTER
Patient is in need of an ER follow up appointment per Dr. Benedict for anxiety/medication change in approximately one week with Dr. Galeas.     Thank you!

## 2019-07-15 ENCOUNTER — OFFICE VISIT (OUTPATIENT)
Dept: INTERNAL MEDICINE | Facility: CLINIC | Age: 69
End: 2019-07-15
Payer: COMMERCIAL

## 2019-07-15 VITALS
HEART RATE: 66 BPM | RESPIRATION RATE: 16 BRPM | TEMPERATURE: 96.4 F | DIASTOLIC BLOOD PRESSURE: 70 MMHG | SYSTOLIC BLOOD PRESSURE: 128 MMHG | BODY MASS INDEX: 28.73 KG/M2 | WEIGHT: 206 LBS | OXYGEN SATURATION: 98 %

## 2019-07-15 DIAGNOSIS — G47.33 OSA (OBSTRUCTIVE SLEEP APNEA): ICD-10-CM

## 2019-07-15 DIAGNOSIS — R25.1 TREMOR: Primary | ICD-10-CM

## 2019-07-15 DIAGNOSIS — I10 ESSENTIAL HYPERTENSION WITH GOAL BLOOD PRESSURE LESS THAN 130/80: ICD-10-CM

## 2019-07-15 DIAGNOSIS — F41.9 ANXIETY: ICD-10-CM

## 2019-07-15 DIAGNOSIS — F43.21 ADJUSTMENT DISORDER WITH DEPRESSED MOOD: ICD-10-CM

## 2019-07-15 PROCEDURE — 99214 OFFICE O/P EST MOD 30 MIN: CPT | Performed by: INTERNAL MEDICINE

## 2019-07-15 RX ORDER — PROPRANOLOL HYDROCHLORIDE 80 MG/1
80 TABLET ORAL 2 TIMES DAILY
COMMUNITY
End: 2022-03-16

## 2019-07-15 RX ORDER — CLONAZEPAM 0.5 MG/1
0.5 TABLET ORAL 2 TIMES DAILY
COMMUNITY
End: 2020-11-02

## 2019-07-15 ASSESSMENT — PAIN SCALES - GENERAL: PAINLEVEL: NO PAIN (0)

## 2019-07-15 NOTE — PROGRESS NOTES
Subjective     Dylon Barragan is a 69 year old male who presents to clinic today for the following health issues:    HPI   ED/UC Followup:    Facility:  [facility]  Date of visit: 7/9/19  Reason for visit: Anxiety  Dyspnea  Pericardial effusion  Current Status: Follow up     Anxiety has been taking over, ended up in the ED. Had high blood pressure.  Ativan really worked well in the ER.  Helped for a day or so.  Then started back with anxiety.      Didn't feel well without propranolol, weaned down.  But now back on it, probably helped him calm down.  Propranolol 80 mg bid.      Saw neurology and doing better.      Tremors, stopped the primodone and went with clonazepam 0.5 mg bid.      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     Complication of anesthesia     half an airway due to stroke hx     Dysphagia      Edema      Essential hypertension, benign      Mixed hyperlipidemia      Other psoriasis      Personal history of unspecified circulatory disease      Sleep apnea     CPAP     Unspecified cerebral artery occlusion with cerebral infarction     1999     Ventral hernia, unspecified, without mention of obstruction or gangrene      Current Outpatient Medications   Medication     ASPIRIN PO     atorvastatin (LIPITOR) 40 MG tablet     baclofen (LIORESAL) 20 MG tablet     clonazePAM (KLONOPIN) 0.5 MG tablet     indapamide (LOZOL) 1.25 MG tablet     order for DME     OTHER MEDICAL SUPPLIES     PARoxetine (PAXIL-CR) 12.5 MG 24 hr tablet     propranolol (INDERAL) 80 MG tablet     albuterol (VENTOLIN HFA) 108 (90 Base) MCG/ACT inhaler     amoxicillin (AMOXIL) 400 MG/5ML suspension     fexofenadine (ALLEGRA) 180 MG tablet     ketoconazole (NIZORAL) 2 % cream     No current facility-administered medications for this visit.      Social History     Tobacco Use     Smoking status: Former Smoker      Packs/day: 1.00     Years: 20.00     Pack years: 20.00     Types: Cigarettes     Last attempt to quit: 1985     Years since quittin.3     Smokeless tobacco: Never Used     Tobacco comment: spouse smokes outside   Substance Use Topics     Alcohol use: Yes     Alcohol/week: 0.0 oz     Comment: 6 per year     Drug use: No     Physical Exam  /70   Pulse 66   Temp 96.4  F (35.8  C) (Temporal)   Resp 16   Wt 93.4 kg (206 lb)   SpO2 98%   BMI 28.73 kg/m    General Appearance-healthy, alert, no distress  Cardiac-regular rate and rhythm  with normal S1, S2 ; no murmur, rub or gallops    ASSESSMENT:  Patient I have seen quite a bit this spring.  Now doing better was in the ER for anxiety and dizziness.    Anxiety, he is back on paxil CR, changed due to insurance and had problems.  Has clonazepam again for anxiety, tremors.  Doing ok, we stopped it before for memory changes but he needs this.    TED will continue on cpap, better with less humidity.     HTN is stable, back on propranolol was ok on lisinopril as well.      Tremors will use the clonazepam, stay off primodone.    I spent greater than 50% of this 30 minute visit in counseling and coordination of care with them reviewing medications.    Electronically signed by Orville Galeas MD  .

## 2019-08-29 ENCOUNTER — OFFICE VISIT (OUTPATIENT)
Dept: SLEEP MEDICINE | Facility: CLINIC | Age: 69
End: 2019-08-29
Payer: COMMERCIAL

## 2019-08-29 VITALS
HEART RATE: 66 BPM | OXYGEN SATURATION: 96 % | DIASTOLIC BLOOD PRESSURE: 60 MMHG | WEIGHT: 206 LBS | BODY MASS INDEX: 28.84 KG/M2 | SYSTOLIC BLOOD PRESSURE: 118 MMHG | HEIGHT: 71 IN

## 2019-08-29 DIAGNOSIS — G47.33 OSA (OBSTRUCTIVE SLEEP APNEA): Primary | ICD-10-CM

## 2019-08-29 PROCEDURE — 99213 OFFICE O/P EST LOW 20 MIN: CPT | Performed by: OTOLARYNGOLOGY

## 2019-08-29 ASSESSMENT — MIFFLIN-ST. JEOR: SCORE: 1716.54

## 2019-08-29 NOTE — PROGRESS NOTES
Obstructive Sleep Apnea - PAP Follow-Up Visit:    Chief Complaint   Patient presents with     CPAP Follow Up       Dylon Barragan comes in today for follow-up of their severe sleep apnea, managed with CPAP.     No specialty comments available.    Overall, he rates the experience with PAP as 9 (0 poor, 10 great). The mask is comfortable.   The mask is leaking at his nose.  The mask is leaking 7 nights per week.  He is not snoring with the mask on. He is not having gasp arousals.  He is not having significant oral/nasal dryness. The pressure is comfortable.     His PAP interface is Full Face Mask.     The patient is usually getting 7 hours of sleep per night.    He does feel rested in the morning.    [place] Sleepiness Scale: 7/24      No data recorded    ResMed       Auto-PAP 6.0 - 15.0 cmH2O 30 day usage data:    93% of days with > 4 hours of use. 0/30 days with no use.   Average use 415 minutes per day.   95%ile Leak 38.71 L/min.   CPAP 95% pressure 12.4 cm.   AHI 14.21 events per hour.         Past medical/surgical history, family history, social history, medications and allergies were reviewed.      Problem List:  Patient Active Problem List    Diagnosis Date Noted     Essential hypertension with goal blood pressure less than 130/80 08/30/2016     Priority: Medium     Adjustment disorder with depressed mood 12/22/2015     Priority: Medium     Acute bronchitis 07/09/2015     Priority: Medium     TED (obstructive sleep apnea) 06/25/2013     Priority: Medium     Advanced directives, counseling/discussion 01/23/2012     Priority: Medium     Advance Directive Problem List Overview:   Name Relationship Phone    Primary Health Care Agent            Alternative Health Care Agent          Discussed advance care planning with patient; however, patient declined at this time. 1/23/2012          HYPERLIPIDEMIA LDL GOAL <130 10/31/2010     Priority: Medium     GERD (gastroesophageal reflux disease) 09/25/2008     Priority:  Medium     Acute, but ill-defined, cerebrovascular disease      Priority: Medium     right brainstem          There were no vitals taken for this visit.    Impression/Plan:  The patient with severe TED had CVA in the past still has residual TED with significant central component(AI 6.7 central).  No new cardiac issues(therefore, unlikely represents Cheyne Seymour) However, since his subjective improvement in EDS and excellent compliance will keep current settings.   Severe Sleep apnea.  Tolerating PAP well. Daytime symptoms are improved.       Dylon Barragan will follow up in about 1 year(s).     Fifteen minutes spent with patient, all of which were spent face-to-face counseling, consulting, coordinating plan of care.      CC:  Orville Galeas, Aubrey Galeano MD

## 2019-10-08 ENCOUNTER — IMMUNIZATION (OUTPATIENT)
Dept: FAMILY MEDICINE | Facility: CLINIC | Age: 69
End: 2019-10-08
Payer: COMMERCIAL

## 2019-10-08 PROCEDURE — 90662 IIV NO PRSV INCREASED AG IM: CPT

## 2019-10-08 PROCEDURE — G0008 ADMIN INFLUENZA VIRUS VAC: HCPCS

## 2019-12-02 DIAGNOSIS — Z29.8 * * * SBE PROPHYLAXIS * * *: ICD-10-CM

## 2019-12-05 RX ORDER — AMOXICILLIN 400 MG/5ML
POWDER, FOR SUSPENSION ORAL
Qty: 75 ML | Refills: 1 | Status: SHIPPED | OUTPATIENT
Start: 2019-12-05 | End: 2020-02-12

## 2019-12-05 NOTE — TELEPHONE ENCOUNTER
"  Requested Prescriptions   Pending Prescriptions Disp Refills     amoxicillin (AMOXIL) 400 MG/5ML suspension [Pharmacy Med Name: AMOXICILLIN 400MG/5ML SUSR] 75 mL 1     Sig: TAKE 25 MLS (2,000 MG) BY MOUTH ONE HOUR BEFORE DENTIST (DISCARD REMAINDER)   Last Written Prescription Date:  7/2/2019  Last Fill Quantity: 25 ml,  # refills: 1  Last office visit: 7/15/2019 with prescribing provider:     Future Office Visit:        Oral Acne/Rosacea Medications Protocol Failed - 12/2/2019  1:35 PM        Failed - Confirmation of diagnosis is required     Please confirm diagnosis is acne or rosacea.     If NOT acne or rosacea; refer request to provider for further evaluation.    If diagnosis IS acne or rosacea, OK to refill BASED ON PREVIOUS REFILL CLINICAL NOTE RECOMMENDATION.          Passed - Patient is 12 years of age or older        Passed - Recent (12 mo) or future (30 days) visit within the authorizing provider's specialty     Patient has had an office visit with the authorizing provider or a provider within the authorizing providers department within the previous 12 mos or has a future within next 30 days. See \"Patient Info\" tab in inbasket, or \"Choose Columns\" in Meds & Orders section of the refill encounter.              Passed - Medication is active on med list      Prescription approved per [facility] Refill Protocol.  Brielle Villanueva RN      "

## 2019-12-23 DIAGNOSIS — E78.5 HYPERLIPIDEMIA LDL GOAL <130: ICD-10-CM

## 2019-12-23 NOTE — TELEPHONE ENCOUNTER
Routing refill request to provider for review/approval because:  Labs not current:  LDL    Vira Cardenas RN on 12/23/2019 at 5:43 PM

## 2019-12-23 NOTE — TELEPHONE ENCOUNTER
"Requested Prescriptions   Pending Prescriptions Disp Refills     atorvastatin (LIPITOR) 40 MG tablet [Pharmacy Med Name: ATORVASTATIN CALCIUM 40MG TABS] 90 tablet 1     Sig: TAKE ONE TABLET BY MOUTH ONCE DAILY   Last Written Prescription Date:  6/25/19  Last Fill Quantity: 90,  # refills: 1   Last office visit: 7/15/19 Adal  Future Office Visit:        Statins Protocol Failed - 12/23/2019  9:24 AM        Failed - LDL on file in past 12 months     Recent Labs   Lab Test 03/23/18  0954   LDL 68           Passed - No abnormal creatine kinase in past 12 months     Recent Labs   Lab Test 10/19/16  0932               Passed - Recent (12 mo) or future (30 days) visit within the authorizing provider's specialty     Patient has had an office visit with the authorizing provider or a provider within the authorizing providers department within the previous 12 mos or has a future within next 30 days. See \"Patient Info\" tab in inbasket, or \"Choose Columns\" in Meds & Orders section of the refill encounter.              Passed - Medication is active on med list        Passed - Patient is age 18 or older          "

## 2019-12-24 RX ORDER — ATORVASTATIN CALCIUM 40 MG/1
TABLET, FILM COATED ORAL
Qty: 90 TABLET | Refills: 1 | Status: SHIPPED | OUTPATIENT
Start: 2019-12-24 | End: 2020-03-17

## 2019-12-27 ENCOUNTER — APPOINTMENT (OUTPATIENT)
Dept: SLEEP MEDICINE | Facility: CLINIC | Age: 69
End: 2019-12-27
Payer: COMMERCIAL

## 2020-01-29 ENCOUNTER — TELEPHONE (OUTPATIENT)
Dept: INTERNAL MEDICINE | Facility: CLINIC | Age: 70
End: 2020-01-29

## 2020-01-29 NOTE — TELEPHONE ENCOUNTER
Patient is due for a PHQ-9.  Index start date:10/13/2019  Index end date:2/10/2020    Please call patient. Pt is active on Snappy shuttle. I have sent a PHQ-9 via Snappy shuttle and will postpone the encounter. Ani Morgan CMA ([facility])

## 2020-02-05 NOTE — TELEPHONE ENCOUNTER
Pt completed PHQ-9.    PHQ-9 SCORE 2/4/2020   PHQ-9 Total Score -   PHQ-9 Total Score MyChart 2 (Minimal depression)   PHQ-9 Total Score 2     Ani Morgan CMA ([facility])

## 2020-02-12 DIAGNOSIS — Z29.8 * * * SBE PROPHYLAXIS * * *: ICD-10-CM

## 2020-02-12 RX ORDER — AMOXICILLIN 400 MG/5ML
POWDER, FOR SUSPENSION ORAL
Qty: 75 ML | Refills: 1 | Status: SHIPPED | OUTPATIENT
Start: 2020-02-12 | End: 2021-04-01

## 2020-02-12 NOTE — TELEPHONE ENCOUNTER
"Requested Prescriptions   Pending Prescriptions Disp Refills     amoxicillin (AMOXIL) 400 MG/5ML suspension [Pharmacy Med Name: AMOXICILLIN 400MG/5ML SUSR] 75 mL 1     Sig: TAKE 25 MLS (2,000 MG) BY MOUTH ONE HOUR BEFORE DENTIST (DISCARD REMAINDER)   Last Written Prescription Date:  12/5/19  Last Fill Quantity: 75 ml,  # refills: 1   Last office visit: 7/15/2019 with prescribing provider:  Adal   Future Office Visit:        Oral Acne/Rosacea Medications Protocol Failed - 2/12/2020 11:02 AM        Failed - Confirmation of diagnosis is required     Please confirm diagnosis is acne or rosacea.     If NOT acne or rosacea; refer request to provider for further evaluation.    If diagnosis IS acne or rosacea, OK to refill BASED ON PREVIOUS REFILL CLINICAL NOTE RECOMMENDATION.          Passed - Patient is 12 years of age or older        Passed - Recent (12 mo) or future (30 days) visit within the authorizing provider's specialty     Patient has had an office visit with the authorizing provider or a provider within the authorizing providers department within the previous 12 mos or has a future within next 30 days. See \"Patient Info\" tab in inbasket, or \"Choose Columns\" in Meds & Orders section of the refill encounter.              Passed - Medication is active on med list          "

## 2020-02-12 NOTE — TELEPHONE ENCOUNTER
Routing refill request to provider for review/approval because:  If NOT acne or rosacea; refer request to provider for further evaluation.    Vira Cardenas RN on 2/12/2020 at 1:52 PM

## 2020-03-15 ENCOUNTER — HEALTH MAINTENANCE LETTER (OUTPATIENT)
Age: 70
End: 2020-03-15

## 2020-03-16 DIAGNOSIS — I10 ESSENTIAL HYPERTENSION WITH GOAL BLOOD PRESSURE LESS THAN 130/80: ICD-10-CM

## 2020-03-16 DIAGNOSIS — E78.5 HYPERLIPIDEMIA LDL GOAL <130: ICD-10-CM

## 2020-03-17 RX ORDER — INDAPAMIDE 1.25 MG/1
1.25 TABLET ORAL EVERY MORNING
Qty: 90 TABLET | Refills: 1 | Status: SHIPPED | OUTPATIENT
Start: 2020-03-17 | End: 2020-09-22

## 2020-03-17 RX ORDER — ATORVASTATIN CALCIUM 40 MG/1
TABLET, FILM COATED ORAL
Qty: 90 TABLET | Refills: 1 | Status: SHIPPED | OUTPATIENT
Start: 2020-03-17 | End: 2020-06-16

## 2020-03-17 NOTE — TELEPHONE ENCOUNTER
"Requested Prescriptions   Pending Prescriptions Disp Refills     atorvastatin (LIPITOR) 40 MG tablet [Pharmacy Med Name: ATORVASTATIN CALCIUM 40MG TABS] 90 tablet 1     Sig: TAKE ONE TABLET BY MOUTH ONCE DAILY       Statins Protocol Failed - 3/16/2020 10:39 AM        Failed - LDL on file in past 12 months     Recent Labs   Lab Test 03/23/18  0954   LDL 68             Passed - No abnormal creatine kinase in past 12 months     Recent Labs   Lab Test 10/19/16  0932                   Passed - Recent (12 mo) or future (30 days) visit within the authorizing provider's specialty     Patient has had an office visit with the authorizing provider or a provider within the authorizing providers department within the previous 12 mos or has a future within next 30 days. See \"Patient Info\" tab in inbasket, or \"Choose Columns\" in Meds & Orders section of the refill encounter.              Passed - Medication is active on med list        Passed - Patient is age 18 or older           indapamide (LOZOL) 1.25 MG tablet [Pharmacy Med Name: INDAPAMIDE 1.25MG TABS] 90 tablet 3     Sig: TAKE 1 TABLET (1.25 MG) BY MOUTH EVERY MORNING       Diuretics (Including Combos) Protocol Passed - 3/16/2020 10:39 AM        Passed - Blood pressure under 140/90 in past 12 months     BP Readings from Last 3 Encounters:   08/29/19 118/60   07/15/19 128/70   07/09/19 122/65                 Passed - Recent (12 mo) or future (30 days) visit within the authorizing provider's specialty     Patient has had an office visit with the authorizing provider or a provider within the authorizing providers department within the previous 12 mos or has a future within next 30 days. See \"Patient Info\" tab in inbasket, or \"Choose Columns\" in Meds & Orders section of the refill encounter.              Passed - Medication is active on med list        Passed - Patient is age 18 or older        Passed - Normal serum creatinine on file in past 12 months     Recent Labs "   Lab Test 07/09/19  0425   CR 0.75              Passed - Normal serum potassium on file in past 12 months     Recent Labs   Lab Test 07/09/19  0425   POTASSIUM 3.4                    Passed - Normal serum sodium on file in past 12 months     Recent Labs   Lab Test 07/09/19  0425

## 2020-03-17 NOTE — TELEPHONE ENCOUNTER
Prescription approved per [facility] Refill Protocol.  Opal Davis RN

## 2020-03-17 NOTE — TELEPHONE ENCOUNTER
"atorvastatin  Last Written Prescription Date:  12/24/2019  Last Fill Quantity: 90,  # refills: 1   Last office visit: 10/8/2019 with prescribing provider:      Future Office Visit:      Requested Prescriptions   Pending Prescriptions Disp Refills     atorvastatin (LIPITOR) 40 MG tablet [Pharmacy Med Name: ATORVASTATIN CALCIUM 40MG TABS] 90 tablet 1     Sig: TAKE ONE TABLET BY MOUTH ONCE DAILY       Statins Protocol Failed - 3/17/2020  2:43 PM        Failed - LDL on file in past 12 months     Recent Labs   Lab Test 03/23/18  0954   LDL 68             Passed - No abnormal creatine kinase in past 12 months     Recent Labs   Lab Test 10/19/16  0932                   Passed - Recent (12 mo) or future (30 days) visit within the authorizing provider's specialty     Patient has had an office visit with the authorizing provider or a provider within the authorizing providers department within the previous 12 mos or has a future within next 30 days. See \"Patient Info\" tab in inbasket, or \"Choose Columns\" in Meds & Orders section of the refill encounter.              Passed - Medication is active on med list        Passed - Patient is age 18 or older            Indapamide  Last Written Prescription Date:  6/4/2019  Last Fill Quantity: 90,  # refills: 3   Last office visit: 10/8/2019 with prescribing provider:      Future Office Visit:         indapamide (LOZOL) 1.25 MG tablet [Pharmacy Med Name: INDAPAMIDE 1.25MG TABS] 90 tablet 3     Sig: TAKE 1 TABLET (1.25 MG) BY MOUTH EVERY MORNING       Diuretics (Including Combos) Protocol Passed - 3/17/2020  2:43 PM        Passed - Blood pressure under 140/90 in past 12 months     BP Readings from Last 3 Encounters:   08/29/19 118/60   07/15/19 128/70   07/09/19 122/65             Passed - Recent (12 mo) or future (30 days) visit within the authorizing provider's specialty     Patient has had an office visit with the authorizing provider or a provider within the authorizing " "providers department within the previous 12 mos or has a future within next 30 days. See \"Patient Info\" tab in inbasket, or \"Choose Columns\" in Meds & Orders section of the refill encounter.              Passed - Medication is active on med list        Passed - Patient is age 18 or older        Passed - Normal serum creatinine on file in past 12 months     Recent Labs   Lab Test 07/09/19  0425   CR 0.75              Passed - Normal serum potassium on file in past 12 months     Recent Labs   Lab Test 07/09/19  0425   POTASSIUM 3.4                    Passed - Normal serum sodium on file in past 12 months     Recent Labs   Lab Test 07/09/19  0425                      Opal Davis RN on 3/17/2020 at 5:58 PM    "

## 2020-04-30 DIAGNOSIS — F43.21 ADJUSTMENT DISORDER WITH DEPRESSED MOOD: ICD-10-CM

## 2020-04-30 RX ORDER — PAROXETINE HYDROCHLORIDE HEMIHYDRATE 12.5 MG/1
12.5 TABLET, FILM COATED, EXTENDED RELEASE ORAL EVERY MORNING
Qty: 30 TABLET | Refills: 11 | Status: SHIPPED | OUTPATIENT
Start: 2020-04-30 | End: 2021-05-25

## 2020-04-30 NOTE — TELEPHONE ENCOUNTER
Last filled 4/27/20 for #30 - has 0 refills remaining.    Patient requesting refills to have on file for future fills.    Thank you   -Michelle Garcia, Pharm.D., [facility], [phone]

## 2020-04-30 NOTE — TELEPHONE ENCOUNTER
Routing refill request to provider for review/approval because:  Patient needs to be seen because:  > 6 months since LOV for depression.  PHQ-9 is up to date though.  Do you want to have a visit with patient?    Last Written Prescription Date:  6/12/19  Last Fill Quantity: 30,  # refills: 11   Last office visit: 7/15/2019 with prescribing provider:     Future Office Visit:      PETRA CurielN, RN

## 2020-06-15 DIAGNOSIS — E78.5 HYPERLIPIDEMIA LDL GOAL <130: ICD-10-CM

## 2020-06-16 RX ORDER — ATORVASTATIN CALCIUM 40 MG/1
TABLET, FILM COATED ORAL
Qty: 90 TABLET | Refills: 1 | Status: SHIPPED | OUTPATIENT
Start: 2020-06-16 | End: 2020-11-02

## 2020-06-16 NOTE — TELEPHONE ENCOUNTER
Prescription approved per [facility] Refill Protocol.  Opal Davis RN

## 2020-07-14 DIAGNOSIS — G47.33 OBSTRUCTIVE SLEEP APNEA (ADULT) (PEDIATRIC): Primary | ICD-10-CM

## 2020-07-23 ENCOUNTER — DOCUMENTATION ONLY (OUTPATIENT)
Dept: SLEEP MEDICINE | Facility: CLINIC | Age: 70
End: 2020-07-23

## 2020-07-23 NOTE — PROGRESS NOTES
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  HE ALSO RECD AIRFIT F30I MASK/HEADGEAR SIZE MEDIUM AND A10 TUBING.

## 2020-09-08 ENCOUNTER — DOCUMENTATION ONLY (OUTPATIENT)
Dept: SLEEP MEDICINE | Facility: CLINIC | Age: 70
End: 2020-09-08

## 2020-09-08 NOTE — PROGRESS NOTES
9/8/2020- ALIS - SPOKE WITH HAVEN AND HE IS COMING TO [place] ON 9/15/ 2020 AT 1PM TO PICKUP MACHINE THAT WAS SENT INTO [facility] AND RETURN LOANER.

## 2020-09-15 ENCOUNTER — DOCUMENTATION ONLY (OUTPATIENT)
Dept: SLEEP MEDICINE | Facility: CLINIC | Age: 70
End: 2020-09-15

## 2020-09-15 NOTE — PROGRESS NOTES
9/15/2020- ALIS OROURKE CALLED AND STATED HIS WIFE IS HAVING SOME MEDICAL ISSUES. HE RESCHEDULED APPT IN [place] FOR 9/24/2020 AT 1PM FOR CURBSIDE PICK-UP.

## 2020-10-15 ENCOUNTER — IMMUNIZATION (OUTPATIENT)
Dept: FAMILY MEDICINE | Facility: OTHER | Age: 70
End: 2020-10-15
Payer: COMMERCIAL

## 2020-10-15 DIAGNOSIS — Z23 NEED FOR PROPHYLACTIC VACCINATION AND INOCULATION AGAINST INFLUENZA: Primary | ICD-10-CM

## 2020-10-15 NOTE — PROGRESS NOTES
Patient consents to receive outdoor care: Yes    Upon arrival, patient instructed to proceed to designated location, place vehicle in park, turn off, and remove keys  and Patient receiving an immunization or injection. Instructed patient to notify healthcare personnel if they are having an adverse reaction.    If we are unable to safely and ergonomically able to provide care- is the patient able to safely able to get out of car and transfer to a chair? Yes      Patient would like to receive their AVS in person after care is given.    Melissa Gaffney MA

## 2020-11-02 ENCOUNTER — MYC REFILL (OUTPATIENT)
Dept: FAMILY MEDICINE | Facility: CLINIC | Age: 70
End: 2020-11-02

## 2020-11-02 DIAGNOSIS — F41.9 ANXIETY: Primary | ICD-10-CM

## 2020-11-02 DIAGNOSIS — E78.5 HYPERLIPIDEMIA LDL GOAL <130: ICD-10-CM

## 2020-11-02 DIAGNOSIS — I10 ESSENTIAL HYPERTENSION WITH GOAL BLOOD PRESSURE LESS THAN 130/80: ICD-10-CM

## 2020-11-02 RX ORDER — INDAPAMIDE 1.25 MG/1
1.25 TABLET ORAL EVERY MORNING
Qty: 90 TABLET | Refills: 1 | Status: CANCELLED | OUTPATIENT
Start: 2020-11-02

## 2020-11-02 RX ORDER — CLONAZEPAM 0.5 MG/1
0.5 TABLET ORAL 2 TIMES DAILY
Qty: 12 TABLET | Refills: 0 | Status: SHIPPED | OUTPATIENT
Start: 2020-11-02

## 2020-11-02 NOTE — TELEPHONE ENCOUNTER
Pt is out and really needs today if possible  Roxy Chow, Pharmacy Tech, [facility] [phone]

## 2020-11-04 RX ORDER — ATORVASTATIN CALCIUM 40 MG/1
40 TABLET, FILM COATED ORAL DAILY
Qty: 90 TABLET | Refills: 3 | Status: SHIPPED | OUTPATIENT
Start: 2020-11-04 | End: 2021-12-14

## 2020-11-04 NOTE — TELEPHONE ENCOUNTER
Left message for patient to call back and speak with any .    Thank you,  Alyssa Winston   for [facility]

## 2020-11-04 NOTE — TELEPHONE ENCOUNTER
Patient calling back, gave message below and scheduled patient for a phone visit with  this Monday 11/09/20.  Patient states he doesn't want to come into the clinic at this time.

## 2020-11-04 NOTE — TELEPHONE ENCOUNTER
"Routing refill request to provider for review/approval because:  Labs not current:  LDL  Patient needs to be seen because it has been more than 1 year since last office visit.    Sending to scheduling for yearly office visit due  [place]'d[place] for 3 months per provider request for review      Requested Prescriptions   Pending Prescriptions Disp Refills     atorvastatin (LIPITOR) 40 MG tablet 90 tablet 1     Sig: Take 1 tablet (40 mg) by mouth daily   Last Written Prescription Date:  6/16/2020  Last Fill Quantity: 90,  # refills: 1   Last office visit: 7/15/2019  Future Office Visit:        Statins Protocol Failed - 11/2/2020  3:14 PM        Failed - LDL on file in past 12 months     Recent Labs   Lab Test 03/23/18  0954   LDL 68           Passed - No abnormal creatine kinase in past 12 months     Recent Labs   Lab Test 10/19/16  0932               Passed - Recent (12 mo) or future (30 days) visit within the authorizing provider's specialty     Patient has had an office visit with the authorizing provider or a provider within the authorizing providers department within the previous 12 mos or has a future within next 30 days. See \"Patient Info\" tab in inbasket, or \"Choose Columns\" in Meds & Orders section of the refill encounter.              Passed - Medication is active on med list        Passed - Patient is age 18 or older         Brielle Villanueva RN      "

## 2020-11-05 ENCOUNTER — VIRTUAL VISIT (OUTPATIENT)
Dept: FAMILY MEDICINE | Facility: OTHER | Age: 70
End: 2020-11-05
Payer: COMMERCIAL

## 2020-11-05 PROCEDURE — 99421 OL DIG E/M SVC 5-10 MIN: CPT | Performed by: FAMILY MEDICINE

## 2020-11-05 NOTE — PROGRESS NOTES
"Date: 2020 12:58:51  Clinician: Alejandra Saxena  Clinician NPI: [id number]  Patient: Dylon Barragan  Patient : 1950  Patient Address: [id number] [place], [place] [id number]  Patient Phone: [phone]  Visit Protocol: URI  Patient Summary:  Dylon is a 70 year old ( : 1950 ) male who initiated a [facility] Visit for COVID-19 (Coronavirus) evaluation and screening. When asked the question \"Please sign me up to receive news, health information and promotions from [facility].\", Dylon responded \"No\".    When asked when his symptoms started, Dylon reported that he does not have any symptoms.   He denies taking antibiotic medication in the past month and having recent facial or sinus surgery in the past 60 days.    Pertinent COVID-19 (Coronavirus) information  Dylon does not work or volunteer as healthcare worker or a . In the past 14 days, Dylon has not worked or volunteered at a healthcare facility or group living setting.   In the past 14 days, he also has not lived in a congregate living setting.   Dylon has had a close contact with a laboratory-confirmed COVID-19 patient in the last 14 days. He was not exposed at his work. Date Dylon was exposed to the laboratory-confirmed COVID-19 patient: 2020   Additional information about contact with COVID-19 (Coronavirus) patient as reported by the patient (free text): Daughter-in-law had first symptoms on 20 and received positive Covid test results 20. We were together In our home for several hours on 10/31/20. We also were exposed while providing  In our home on 11/3/20 when she dropped off and picked up our 6 month old granddaughter. Our son and granddaughter will be having Covid tests tomorrow. I currently to not have symptoms. I do have concerning health issues. 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 He " was in an enclosed space for greater than 15 minutes with the COVID-19 patient.   During the encounter, neither were wearing masks.   Since December 2019, Dylon has not been tested for COVID-19 and has not had upper respiratory infection or influenza-like illness.   Pertinent medical history  Dylon does not need a return to work/school note.   Weight: 209 lbs   Dylon does not smoke or use smokeless tobacco.   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 I have a compromised swallow and airway as well as balance and vision issues. Also some dizziness. I am being treated for hypertension, hyperlipidemia and obstructed sleep apnea. As a result of my compromised airway I am susceptible to developing bronchitis. I would like to coordinate a Covid test visit with my wife who has also completed a questionnaire.   Weight: 209 lbs    MEDICATIONS: Vitamin D3 oral, Complete Multivitamin-Multimineral oral, Ventolin HFA inhalation, Aspirin Low Dose oral, atorvastatin oral, propranolol-hydrochlorothiazide oral, baclofen oral, indapamide oral, clonazepam oral, paroxetine oral, ALLERGIES: NKDA  Clinician Response:  Dear Dylon,   Your symptoms show that you may have coronavirus (COVID-19). This illness can cause fever, cough and trouble breathing. Many people get a mild case and get better on their own. Some people can get very sick.  What should I do?  We would like to test you for this virus.   1. Please call [phone] to schedule your visit. Explain that you were referred by [facility] to have a COVID-19 test. Be ready to share your [facility] visit ID number.  * If you need to schedule in [facility] please call [phone] or for [employer] employees please call [phone].  * If you need to schedule in the [place] area please call [phone]. [employer] employees call [phone].  The following will serve as your written order for this COVID Test, ordered by me, for the  "indication of suspected COVID [Z20.828]: The test will be ordered in [employer], our electronic health record, after you are scheduled. It will show as ordered and authorized by Enio Bowman MD.  Order: COVID-19 (Coronavirus) PCR for SYMPTOMATIC testing from [facility].   2. When it's time for your COVID test:  Stay at least 6 feet away from others. (If someone will drive you to your test, stay in the backseat, as far away from the  as you can.)   Cover your mouth and nose with a mask, tissue or washcloth.  Go straight to the testing site. Don't make any stops on the way there or back.      3.Starting now: Stay home and away from others (self-isolate) until:   You've had no fever---and no medicine that reduces fever---for one full day (24 hours). And...   Your other symptoms have gotten better. For example, your cough or breathing has improved. And...   At least 10 days have passed since your symptoms started.       During this time, don't leave the house except for testing or medical care.   Stay in your own room, even for meals. Use your own bathroom if you can.   Stay away from others in your home. No hugging, kissing or shaking hands. No visitors.  Don't go to work, school or anywhere else.    Clean \"high touch\" surfaces often (doorknobs, counters, handles, etc.). Use a household cleaning spray or wipes. You'll find a full list of  on the EPA website: www.epa.gov/pesticide-registration/list-n-disinfectants-use-against-sars-cov-2.   Cover your mouth and nose with a mask, tissue or washcloth to avoid spreading germs.  Wash your hands and face often. Use soap and water.  Caregivers in these groups are at risk for severe illness due to COVID-19:  o People 65 years and older  o People who live in a nursing home or long-term care facility  o People with chronic disease (lung, heart, cancer, diabetes, kidney, liver, immunologic)  o People who have a weakened immune system, including those who:   Are in cancer " treatment  Take medicine that weakens the immune system, such as corticosteroids  Had a bone marrow or organ transplant  Have an immune deficiency  Have poorly controlled HIV or AIDS  Are obese (body mass index of 40 or higher)  Smoke regularly   o Caregivers should wear gloves while washing dishes, handling laundry and cleaning bedrooms and bathrooms.  o Use caution when washing and drying laundry: Don't shake dirty laundry, and use the warmest water setting that you can.  o For more tips, go to www.cdc.gov/coronavirus/2019-ncov/downloads/10Things.pdf.    4.Sign up for [employer]. We know it's scary to hear that you might have COVID-19. We want to track your symptoms to make sure you're okay over the next 2 weeks. Please look for an email from [employer]---this is a free, online program that we'll use to keep in touch. To sign up, follow the link in the email. Learn more at [email or web address]  How can I take care of myself?   Get lots of rest. Drink extra fluids (unless a doctor has told you not to).   Take Tylenol (acetaminophen) for fever or pain. If you have liver or kidney problems, ask your family doctor if it's okay to take Tylenol.   Adults can take either:    650 mg (two 325 mg pills) every 4 to 6 hours, or...   1,000 mg (two 500 mg pills) every 8 hours as needed.    Note: Don't take more than 3,000 mg in one day. Acetaminophen is found in many medicines (both prescribed and over-the-counter medicines). Read all labels to be sure you don't take too much.   For children, check the Tylenol bottle for the right dose. The dose is based on the child's age or weight.    If you have other health problems (like cancer, heart failure, an organ transplant or severe kidney disease): Call your specialty clinic if you don't feel better in the next 2 days.       Know when to call 911. Emergency warning signs include:    Trouble breathing or shortness of breath Pain or pressure in the chest that doesn't  go away Feeling confused like you haven't felt before, or not being able to wake up Bluish-colored lips or face.  Where can I get more information?    [facility] -- About COVID-19: [email or web address]   CDC -- What to Do If You're Sick: www.cdc.gov/coronavirus/2019-ncov/about/steps-when-sick.html   CDC -- Ending Home Isolation: www.cdc.gov/coronavirus/2019-ncov/hcp/disposition-in-home-patients.html   CDC -- Caring for Someone: www.cdc.gov/coronavirus/2019-ncov/if-you-are-sick/care-for-someone.html   [facility] -- Interim Guidance for Hospital Discharge to Home: www.[facility].mn.us/diseases/coronavirus/hcp/hospdischarge.pdf   [facility] clinical trials (COVID-19 research studies): clinicalaffairs.[facility]-clinical-trials    Below are the COVID-19 hotlines at the [facility] ([facility]). Interpreters are available.    For health questions: Call 475-629-1455 or 1-169.170.6802 (7 a.m. to 7 p.m.) For questions about schools and childcare: Call 591-294-1628 or 1-472.690.6638 (7 a.m. to 7 p.m.)    Diagnosis: Cough  Diagnosis ICD: R05

## 2020-11-07 ENCOUNTER — VIRTUAL VISIT (OUTPATIENT)
Dept: FAMILY MEDICINE | Facility: OTHER | Age: 70
End: 2020-11-07
Payer: COMMERCIAL

## 2020-11-07 PROCEDURE — 99421 OL DIG E/M SVC 5-10 MIN: CPT | Performed by: PHYSICIAN ASSISTANT

## 2020-11-07 NOTE — PROGRESS NOTES
"Date: 2020 15:19:29  Clinician: Carlos Rivera  Clinician NPI: [id number]  Patient: Dylon Barragan  Patient : 1950  Patient Address: [address] [place] [address]  Patient Phone: [phone]  Visit Protocol: URI  Patient Summary:  Dylon is a 70 year old ( : 1950 ) male who initiated a [facility] Visit for COVID-19 (Coronavirus) evaluation and screening. When asked the question \"Please sign me up to receive news, health information and promotions from [facility].\", Dylon responded \"No\".    Dylon states his symptoms started 1-2 days ago.   His symptoms consist of rhinitis, facial pain or pressure, chills, malaise, a headache, and a cough. Dylon also feels feverish.   Symptom details     Nasal secretions: The color of his mucus is clear.    Cough: Dylon coughs a few times an hour and his cough is not more bothersome at night. Phlegm comes into his throat when he coughs. He believes his cough is caused by post-nasal drip. The color of the phlegm is white.     Temperature: His current temperature is 98.5 degrees Fahrenheit.     Facial pain or pressure: The facial pain or pressure does not feel worse when bending or leaning forward.     Headache: He states the headache is mild (1-3 on a 10 point pain scale).      Dylon denies having vomiting, myalgias, sore throat, teeth pain, ageusia, diarrhea, ear pain, wheezing, nasal congestion, nausea, and anosmia. He also denies taking antibiotic medication in the past month, having recent facial or sinus surgery in the past 60 days, and having a sinus infection within the past year. He is not experiencing dyspnea.   Precipitating events  He has not recently been exposed to someone with influenza. Dylon has been in close contact with the following high risk individuals: adults 65 or older, children under the age of 5, and people with asthma, heart disease or diabetes.   Pertinent COVID-19 (Coronavirus) information  Dylon does not work or volunteer as " healthcare worker or a . In the past 14 days, Dylon has not worked or volunteered at a healthcare facility or group living setting.   In the past 14 days, he also has not lived in a congregate living setting.   Dylon has had a close contact with a laboratory-confirmed COVID-19 patient within 14 days of symptom onset. He was not exposed at his work. Date Dylon was exposed to the laboratory-confirmed COVID-19 patient: 05/03/2020   Additional information about contact with COVID-19 (Coronavirus) patient as reported by the patient (free text): Daughter-in-law. Already had an [facility] visit 5/5/20 and provided all this information. It was determined that he should be tested. Appt for testing scheduled for 11/10/20. Now developed symptoms last night and I'm looking for advice on what symptoms indicate urgent intervention. Had fever as high as 103 at one point but was steadily in the 100-101 range for 4+ hours. Ibuprofen helped bring it down. Oxygen saturation level was in the 88-91 range during this time. His Cpap machine helped.    Since December 2019, Dylon has not been tested for COVID-19 and has not had upper respiratory infection or influenza-like illness.   Pertinent medical history  Dylon does not need a return to work/school note.   Weight: 209 lbs   Dylon does not smoke or use smokeless tobacco.   Additional information as reported by the patient (free text): Please check the information from our prior visit. Tyler's swallow, upper respiratory system, balance and vision are compromised due to a brain stem stroke in 1999. He is seeming to be doing well right now. His oxygen saturation level is currently the highest it's been at 96. I'm hoping last night was a fluke but in case these symptoms begin to develop again I am looking for information on what symptoms indicate I should seek emergency intervention and from where.   Weight: 209 lbs    MEDICATIONS: Vitamin D3 oral, Complete  Multivitamin-Multimineral oral, Ventolin HFA inhalation, Aspirin Low Dose oral, atorvastatin oral, propranolol-hydrochlorothiazide oral, baclofen oral, indapamide oral, clonazepam oral, paroxetine oral, ALLERGIES: NKDA  Clinician Response:  Peña Osborne,   Oxygen levels below 94, chest pain, shortness of breath, fever that is not controlled using Tylenol or ibuprofen are some of the symptoms that you should seek help in the emergency department.&nbsp; Use your best judgement. &nbsp;    Diagnosis: Contact with and (suspected) exposure to other viral communicable diseases  Diagnosis ICD: Z20.828

## 2020-11-09 ENCOUNTER — VIRTUAL VISIT (OUTPATIENT)
Dept: INTERNAL MEDICINE | Facility: CLINIC | Age: 70
End: 2020-11-09
Payer: COMMERCIAL

## 2020-11-09 VITALS
SYSTOLIC BLOOD PRESSURE: 136 MMHG | DIASTOLIC BLOOD PRESSURE: 68 MMHG | OXYGEN SATURATION: 93 % | TEMPERATURE: 97 F | HEART RATE: 70 BPM

## 2020-11-09 DIAGNOSIS — G47.33 OSA (OBSTRUCTIVE SLEEP APNEA): ICD-10-CM

## 2020-11-09 DIAGNOSIS — U07.1 CLINICAL DIAGNOSIS OF COVID-19: Primary | ICD-10-CM

## 2020-11-09 DIAGNOSIS — J45.909 MILD ASTHMA WITHOUT COMPLICATION, UNSPECIFIED WHETHER PERSISTENT: ICD-10-CM

## 2020-11-09 DIAGNOSIS — I10 ESSENTIAL HYPERTENSION WITH GOAL BLOOD PRESSURE LESS THAN 130/80: ICD-10-CM

## 2020-11-09 PROCEDURE — 99443 PR PHYSICIAN TELEPHONE EVALUATION 21-30 MIN: CPT | Mod: 95 | Performed by: INTERNAL MEDICINE

## 2020-11-09 RX ORDER — INDAPAMIDE 1.25 MG/1
1.25 TABLET ORAL EVERY MORNING
Qty: 90 TABLET | Refills: 3 | Status: SHIPPED | OUTPATIENT
Start: 2020-11-09 | End: 2021-11-16

## 2020-11-09 RX ORDER — ALBUTEROL SULFATE 90 UG/1
AEROSOL, METERED RESPIRATORY (INHALATION)
Qty: 18 G | Refills: 2 | Status: SHIPPED | OUTPATIENT
Start: 2020-11-09

## 2020-11-09 NOTE — PROGRESS NOTES
"Dylon Barragan is a 70 year old male who is being evaluated via a billable telephone visit.      The patient has been notified of following:     \"This telephone visit will be conducted via a call between you and your physician/provider. We have found that certain health care needs can be provided without the need for a physical exam.  This service lets us provide the care you need with a short phone conversation.  If a prescription is necessary we can send it directly to your pharmacy.  If lab work is needed we can place an order for that and you can then stop by our lab to have the test done at a later time.    Telephone visits are billed at different rates depending on your insurance coverage. During this emergency period, for some insurers they may be billed the same as an in-person visit.  Please reach out to your insurance provider with any questions.    If during the course of the call the physician/provider feels a telephone visit is not appropriate, you will not be charged for this service.\"    Patient has given verbal consent for Telephone visit?  Yes    What phone number would you like to be contacted at? [phone]    How would you like to obtain your AVS? Janicet    Subjective     Dylon Barragan is a 70 year old male who presents via phone visit today for the following health issues:    HPI     Chief Complaint   Patient presents with     Telephone     recheck medications and COVID concerns(see previous [facility] notes)     Needs a few medications refilled and those are done.     Exposed on Oct 31st, daughter in law got sick on the 4th and on the 3rd was asymptomatic saw them.      Tyler got some symptoms on the 6th at 10 PM, spiked fever to 103 degrees. Ibuprofen went down to 101 then 99 to 100 range.  Oximeter shows him in the low 90 range.  Some cough and congestion some sputum.  Clammy at certain times.     Has body aches and soreness,     Has cpap and does use at night.       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     Dysphagia      Edema      Essential hypertension, benign      Mixed hyperlipidemia      Other psoriasis      Personal history of unspecified circulatory disease      Sleep apnea     CPAP     Unspecified cerebral artery occlusion with cerebral infarction          Ventral hernia, unspecified, without mention of obstruction or gangrene      Current Outpatient Medications   Medication     albuterol (VENTOLIN HFA) 108 (90 Base) MCG/ACT inhaler     ASPIRIN PO     atorvastatin (LIPITOR) 40 MG tablet     baclofen (LIORESAL) 20 MG tablet     clonazePAM (KLONOPIN) 0.5 MG tablet     indapamide (LOZOL) 1.25 MG tablet     ketoconazole (NIZORAL) 2 % cream     order for DME     OTHER MEDICAL SUPPLIES     PARoxetine (PAXIL-CR) 12.5 MG 24 hr tablet     propranolol (INDERAL) 80 MG tablet     amoxicillin (AMOXIL) 400 MG/5ML suspension     fexofenadine (ALLEGRA) 180 MG tablet     No current facility-administered medications for this visit.      Social History     Tobacco Use     Smoking status: Former Smoker     Packs/day: 1.00     Years: 20.00     Pack years: 20.00     Types: Cigarettes     Quit date: 1985     Years since quittin.6     Smokeless tobacco: Never Used     Tobacco comment: spouse smokes outside   Substance Use Topics     Alcohol use: Yes     Alcohol/week: 0.0 standard drinks     Comment: 6 per year     Drug use: No         Review of Systems   Fevers  Body aches  Cough  No chest pain  No nausea or vomiting       Objective          Vitals:  No vitals were obtained today due to virtual visit.    alert and mild distress  PSYCH: Alert and oriented times 3; coherent speech, normal   rate and volume, able to articulate logical thoughts, able   to abstract reason, no tangential thoughts, no hallucinations   or  delusions  His affect is slow  RESP: No cough, no audible wheezing, able to talk in full sentences  Remainder of exam unable to be completed due to telephone visits            Assessment/Plan:    Assessment & Plan     Mild asthma without complication, unspecified whether persistent  Asthma is mild he has albuterol inhaler to use as needed.  May need it more with this episode of COVID-19.  - albuterol (VENTOLIN HFA) 108 (90 Base) MCG/ACT inhaler; INHALE 2 PUFFS BY MOUTH EVERY 6 HOURS AS NEEDED FOR SHORTNESS OF BREATH /DYSPNEA    Essential hypertension with goal blood pressure less than 130/80  Blood pressure needs a refill of his indapamide we will do this and have him come back and see us for labs and follow-up.  - indapamide (LOZOL) 1.25 MG tablet; Take 1 tablet (1.25 mg) by mouth every morning    Clinical diagnosis of COVID-19  COVID-19 he was exposed last week he was positive with symptoms.  I am sure he has this diagnosis he will be tested tomorrow.  He has fevers, body aches, shortness of breath.  He have an oximeter and is running in the low 90s.  I told him 89% is okay.  If he gets less than that he needs to come in if he has sustained readings in the 80s.  He can use the albuterol to help him.  He has a CPAP at night.  There is no other treatment for outpatient.    TED (obstructive sleep apnea)  Patient can use his CPAP when he is short of breath.  And specifically at night.              No follow-ups on file.    Orville Galeas MD  [facility]    Phone call duration:  26 minutes

## 2020-11-10 DIAGNOSIS — Z20.822 SUSPECTED 2019 NOVEL CORONAVIRUS INFECTION: Primary | ICD-10-CM

## 2020-11-10 PROCEDURE — U0003 INFECTIOUS AGENT DETECTION BY NUCLEIC ACID (DNA OR RNA); SEVERE ACUTE RESPIRATORY SYNDROME CORONAVIRUS 2 (SARS-COV-2) (CORONAVIRUS DISEASE [COVID-19]), AMPLIFIED PROBE TECHNIQUE, MAKING USE OF HIGH THROUGHPUT TECHNOLOGIES AS DESCRIBED BY CMS-2020-01-R: HCPCS | Performed by: FAMILY MEDICINE

## 2020-11-11 LAB
SARS-COV-2 RNA SPEC QL NAA+PROBE: ABNORMAL
SPECIMEN SOURCE: ABNORMAL

## 2021-03-31 ENCOUNTER — TELEPHONE (OUTPATIENT)
Dept: INTERNAL MEDICINE | Facility: CLINIC | Age: 71
End: 2021-03-31

## 2021-03-31 DIAGNOSIS — J20.8 ACUTE BRONCHITIS DUE TO OTHER SPECIFIED ORGANISMS: ICD-10-CM

## 2021-03-31 DIAGNOSIS — J40 BRONCHITIS: ICD-10-CM

## 2021-03-31 RX ORDER — PREDNISONE 20 MG/1
40 TABLET ORAL DAILY
Qty: 10 TABLET | Refills: 0 | Status: SHIPPED | OUTPATIENT
Start: 2021-03-31 | End: 2021-06-16

## 2021-03-31 RX ORDER — AZITHROMYCIN 250 MG/1
TABLET, FILM COATED ORAL
Qty: 6 TABLET | Refills: 0 | Status: SHIPPED | OUTPATIENT
Start: 2021-03-31 | End: 2021-06-16

## 2021-03-31 NOTE — TELEPHONE ENCOUNTER
Reason for call:  Patient reporting a symptom    Symptom or request: hacking green Phlem with stroke 20 yrs ago half is airway is paralyzed. And he hoping to get a medication to stop this before he does have trouble    Duration (how long have symptoms been present): 1 week    Have you been treated for this before? Yes    Additional comments: FYI patient did previously have covid in Nov. And also received his last covid Vaccine last week    Phone Number patient can be reached at:  Other phone number:  [phone]*    Best Time:  any    Can we leave a detailed message on this number:  YES    Call taken on 3/31/2021 at 8:40 AM by Tere Baeza

## 2021-04-01 ENCOUNTER — VIRTUAL VISIT (OUTPATIENT)
Dept: FAMILY MEDICINE | Facility: CLINIC | Age: 71
End: 2021-04-01
Payer: COMMERCIAL

## 2021-04-01 VITALS — WEIGHT: 206 LBS | TEMPERATURE: 97 F | OXYGEN SATURATION: 95 % | BODY MASS INDEX: 28.73 KG/M2

## 2021-04-01 DIAGNOSIS — J20.9 ACUTE BRONCHITIS, UNSPECIFIED ORGANISM: Primary | ICD-10-CM

## 2021-04-01 PROCEDURE — 99443 PR PHYSICIAN TELEPHONE EVALUATION 21-30 MIN: CPT | Mod: 95 | Performed by: FAMILY MEDICINE

## 2021-04-01 ASSESSMENT — ASTHMA QUESTIONNAIRES
QUESTION_5 LAST FOUR WEEKS HOW WOULD YOU RATE YOUR ASTHMA CONTROL: SOMEWHAT CONTROLLED
QUESTION_2 LAST FOUR WEEKS HOW OFTEN HAVE YOU HAD SHORTNESS OF BREATH: ONCE OR TWICE A WEEK
QUESTION_1 LAST FOUR WEEKS HOW MUCH OF THE TIME DID YOUR ASTHMA KEEP YOU FROM GETTING AS MUCH DONE AT WORK, SCHOOL OR AT HOME: A LITTLE OF THE TIME
QUESTION_4 LAST FOUR WEEKS HOW OFTEN HAVE YOU USED YOUR RESCUE INHALER OR NEBULIZER MEDICATION (SUCH AS ALBUTEROL): TWO OR THREE TIMES PER WEEK
QUESTION_3 LAST FOUR WEEKS HOW OFTEN DID YOUR ASTHMA SYMPTOMS (WHEEZING, COUGHING, SHORTNESS OF BREATH, CHEST TIGHTNESS OR PAIN) WAKE YOU UP AT NIGHT OR EARLIER THAN USUAL IN THE MORNING: TWO OR THREE NIGHTS A WEEK
ACT_TOTALSCORE: 16

## 2021-04-01 NOTE — PROGRESS NOTES
Tyler is a 71 year old who is being evaluated via a billable video visit.      How would you like to obtain your AVS? MyChart  If the video visit is dropped, the invitation should be resent by: Send to e-mail at: [email or web address]  Will anyone else be joining your video visit? No      Telephone visit Start Time: 11:10 AM    Assessment & Plan     ASSESSMENT/ORDERS:    ICD-10-CM    1. Acute bronchitis, unspecified organism  J20.9 Symptomatic COVID-19 Virus (Coronavirus) by PCR     PLAN:  1.  Patient will start antibiotic and prednisone per order sent last night.  I recommended COVID-19 testing as well just to be certain he does not have that as well.                  No follow-ups on file.    Fernie Barahona MD  [facility]   Tyler is a 71 year old who presents for the following health issues     HPI     Acute Illness  Acute illness concerns: URI  Onset/Duration: 1 week  Symptoms:  Fever: no  Chills/Sweats: no  Headache (location?): no  Sinus Pressure: no  Conjunctivitis:  no  Ear Pain: no  Rhinorrhea: YES  Congestion: YES  Sore Throat: YES  Cough: YES-productive of green sputum  Wheeze: no  Decreased Appetite: no  Nausea: no  Vomiting: no  Diarrhea: no  Dysuria/Freq.: no  Dysuria or Hematuria: no  Fatigue/Achiness: YES  Sick/Strep Exposure: no-just had covid vaccine  Therapies tried and outcome: zpak and prednisone have helped in the past  Patient had a stroke 23 years ago and has a paralyzed airway     Patient had azithromycin and prednisone sent last night by another provider already but he was not aware.  He knew that he should review symptoms with provider.  He had COVID-19 five months ago and recovered.  Got second vaccine dose a week ago.  Had symptoms starting of cough 1 day prior to vaccine.  He notes symptoms are like his usual bronchitis, not the COVID-19 symptoms he had last time.    Is planning on going out for dinner for first time in a year with family in 3  days to celebrate east.      Review of Systems         Objective           Vitals:  No vitals were obtained today due to virtual visit.    Physical Exam   healthy, alert and no distress  PSYCH: Alert and oriented times 3; coherent speech, normal   rate and volume, able to articulate logical thoughts, able   to abstract reason, no tangential thoughts, no hallucinations   or delusions  His affect is normal  RESP: No cough, no audible wheezing, able to talk in full sentences  Remainder of exam unable to be completed due to telephone visits                 Video-Visit Details    Type of service:  telephone    Telephone End Time:11:33 AM    Originating Location (pt. Location): Home    Distant Location (provider location):  [facility]     Platform used for Video Visit: Other: telephone due to patient technical issues

## 2021-04-01 NOTE — TELEPHONE ENCOUNTER
"S-(situation): Tyler is requesting some meds that he usually takes when he gets symptomatic    B-(background): had a stroke years ago and it has caused a paralyzed airway. When he starts \"hacking green phlegm\" he always starts on an antibiotic and steroid. He has not had this happen in 2-3 years..    A-(assessment): cough with green phlegm. No fever, feels just fine. Just wants to get ahead of it due to concerns of symptoms becoming unmanagable.    R-(recommendations): virtual visit with provider today.    "

## 2021-04-01 NOTE — PATIENT INSTRUCTIONS
Instructions for Patients  It is recommended that you have a test for coronavirus (COVID-19). This illness can cause fever, cough and trouble breathing. Many people get a mild case and get better on their own. Some people can get very sick.     Please follow these steps:    1. We will call to schedule your test.  2. A member of our care team will ask you some questions. Then, they will use a swab to collect samples from your nose and throat.     Our testing team will send you your test results.    How can I protect others?    Stay home and away from others (self-isolate) until:    You ve had no fever--and no medicine that reduces fever--for 1 full day (24 hours). And      Your other symptoms have resolved (gotten better). For example, your cough or breathing has improved. And     At least 10 days have passed since your symptoms started.    Stay at least 6 feet away from others. (If someone will drive you to your test, stay in the backseat, as far away from the  as you can.)     Don t go to work, school or anywhere else. When it s time for your test, go straight to the testing site. Don t make any stops on the way there or back.     Wash your hands and face often. Use soap and water.     Cover your mouth and nose with a mask, tissue or washcloth.     Don t touch anyone. No hugging, kissing or handshakes.    How can I take care of myself?    1. Get lots of rest. Drink extra fluids (unless a doctor has told you not to).     2. Take Tylenol (acetaminophen) for fever or pain. If you have liver or kidney problems, ask your family doctor if it's okay to take Tylenol.     Adults can take either:     650 mg (two 325 mg pills) every 4 to 6 hours, or     1,000 mg (two 500 mg pills) every 8 hours as needed.     Note: Don't take more than 3,000 mg in one day.   Acetaminophen is found in many medicines (both prescribed and over-the-counter medicines). Read all labels to be sure you don't take too much.   For children,  check the Tylenol bottle for the right dose. The dose is based on  the child's age or weight.    3. If you have other health problems (like cancer, heart failure, an organ transplant or severe kidney disease): Call your specialty clinic if you don't feel better in the next 2 days.    4. Know when to call 911: If your breathing is so bad that it keeps you from doing normal activities, call 911 or go to the emergency room. Tell them that you've been staying home and may have COVID-19.      Thank you for limiting contact with others, wearing a simple mask to cover your cough, practice good hand hygiene habits and accessing our virtual services where possible to limit the spread of this virus.    For more information about COVID19 and options for caring for yourself at home, please visit the CDC website at https://www.cdc.gov/coronavirus/2019-ncov/about/steps-when-sick.html  For more options for care at [facility], please visit our website at [email or web address]

## 2021-04-02 DIAGNOSIS — J20.9 ACUTE BRONCHITIS, UNSPECIFIED ORGANISM: ICD-10-CM

## 2021-04-02 PROCEDURE — U0005 INFEC AGEN DETEC AMPLI PROBE: HCPCS | Performed by: FAMILY MEDICINE

## 2021-04-02 PROCEDURE — U0003 INFECTIOUS AGENT DETECTION BY NUCLEIC ACID (DNA OR RNA); SEVERE ACUTE RESPIRATORY SYNDROME CORONAVIRUS 2 (SARS-COV-2) (CORONAVIRUS DISEASE [COVID-19]), AMPLIFIED PROBE TECHNIQUE, MAKING USE OF HIGH THROUGHPUT TECHNOLOGIES AS DESCRIBED BY CMS-2020-01-R: HCPCS | Performed by: FAMILY MEDICINE

## 2021-04-02 ASSESSMENT — ASTHMA QUESTIONNAIRES: ACT_TOTALSCORE: 16

## 2021-04-03 LAB
SARS-COV-2 RNA RESP QL NAA+PROBE: NOT DETECTED
SPECIMEN SOURCE: NORMAL

## 2021-05-08 ENCOUNTER — HEALTH MAINTENANCE LETTER (OUTPATIENT)
Age: 71
End: 2021-05-08

## 2021-05-24 DIAGNOSIS — F43.21 ADJUSTMENT DISORDER WITH DEPRESSED MOOD: ICD-10-CM

## 2021-05-25 RX ORDER — PAROXETINE HYDROCHLORIDE HEMIHYDRATE 12.5 MG/1
12.5 TABLET, FILM COATED, EXTENDED RELEASE ORAL EVERY MORNING
Qty: 90 TABLET | Refills: 0 | Status: SHIPPED | OUTPATIENT
Start: 2021-05-25 | End: 2021-06-16

## 2021-05-25 NOTE — TELEPHONE ENCOUNTER
Routing to team to set up F2F appointment for patient for yearly.    Routing refill request to provider for review/approval because:  Labs not current:  PHQ-9  PHQ 12/5/2018 6/12/2019 2/4/2020   PHQ-9 Total Score 7 13 2   Q9: Thoughts of better off dead/self-harm past 2 weeks Not at all Not at all Not at all     Patient needs to be seen because it has been more than 1 year since last office visit.    Last Written Prescription Date:  4/30/2020  Last Fill Quantity: 30,  # refills: 11   Last office visit: 7/15/2019 with prescribing provider:     Future Office Visit:        Megan Cavazos, PETRAN, RN

## 2021-05-26 ENCOUNTER — MYC MEDICAL ADVICE (OUTPATIENT)
Dept: FAMILY MEDICINE | Facility: CLINIC | Age: 71
End: 2021-05-26

## 2021-05-26 DIAGNOSIS — I10 ESSENTIAL HYPERTENSION WITH GOAL BLOOD PRESSURE LESS THAN 130/80: Primary | Chronic | ICD-10-CM

## 2021-06-11 DIAGNOSIS — I10 ESSENTIAL HYPERTENSION WITH GOAL BLOOD PRESSURE LESS THAN 130/80: Chronic | ICD-10-CM

## 2021-06-11 LAB
ALBUMIN SERPL-MCNC: 3.6 G/DL (ref 3.4–5)
ALP SERPL-CCNC: 87 U/L (ref 40–150)
ALT SERPL W P-5'-P-CCNC: 39 U/L (ref 0–70)
ANION GAP SERPL CALCULATED.3IONS-SCNC: 1 MMOL/L (ref 3–14)
AST SERPL W P-5'-P-CCNC: 29 U/L (ref 0–45)
BILIRUB SERPL-MCNC: 0.9 MG/DL (ref 0.2–1.3)
BUN SERPL-MCNC: 16 MG/DL (ref 7–30)
CALCIUM SERPL-MCNC: 8.5 MG/DL (ref 8.5–10.1)
CHLORIDE SERPL-SCNC: 106 MMOL/L (ref 94–109)
CHOLEST SERPL-MCNC: 120 MG/DL
CO2 SERPL-SCNC: 34 MMOL/L (ref 20–32)
CREAT SERPL-MCNC: 0.79 MG/DL (ref 0.66–1.25)
GFR SERPL CREATININE-BSD FRML MDRD: 89 ML/MIN/{1.73_M2}
GLUCOSE SERPL-MCNC: 125 MG/DL (ref 70–99)
HDLC SERPL-MCNC: 42 MG/DL
LDLC SERPL CALC-MCNC: 58 MG/DL
NONHDLC SERPL-MCNC: 78 MG/DL
POTASSIUM SERPL-SCNC: 3.7 MMOL/L (ref 3.4–5.3)
PROT SERPL-MCNC: 7.1 G/DL (ref 6.8–8.8)
SODIUM SERPL-SCNC: 141 MMOL/L (ref 133–144)
TRIGL SERPL-MCNC: 101 MG/DL

## 2021-06-11 PROCEDURE — 80053 COMPREHEN METABOLIC PANEL: CPT | Performed by: INTERNAL MEDICINE

## 2021-06-11 PROCEDURE — 80061 LIPID PANEL: CPT | Performed by: INTERNAL MEDICINE

## 2021-06-11 PROCEDURE — 36415 COLL VENOUS BLD VENIPUNCTURE: CPT | Performed by: INTERNAL MEDICINE

## 2021-06-16 ENCOUNTER — OFFICE VISIT (OUTPATIENT)
Dept: INTERNAL MEDICINE | Facility: CLINIC | Age: 71
End: 2021-06-16
Payer: COMMERCIAL

## 2021-06-16 VITALS
DIASTOLIC BLOOD PRESSURE: 84 MMHG | SYSTOLIC BLOOD PRESSURE: 126 MMHG | RESPIRATION RATE: 16 BRPM | HEIGHT: 71 IN | HEART RATE: 66 BPM | BODY MASS INDEX: 29.4 KG/M2 | OXYGEN SATURATION: 96 % | TEMPERATURE: 97.2 F | WEIGHT: 210 LBS

## 2021-06-16 DIAGNOSIS — R21 RASH AND NONSPECIFIC SKIN ERUPTION: ICD-10-CM

## 2021-06-16 DIAGNOSIS — I10 ESSENTIAL HYPERTENSION WITH GOAL BLOOD PRESSURE LESS THAN 130/80: ICD-10-CM

## 2021-06-16 DIAGNOSIS — R13.14 PHARYNGOESOPHAGEAL DYSPHAGIA: ICD-10-CM

## 2021-06-16 DIAGNOSIS — Z00.00 MEDICARE ANNUAL WELLNESS VISIT, SUBSEQUENT: Primary | ICD-10-CM

## 2021-06-16 DIAGNOSIS — F41.9 ANXIETY: ICD-10-CM

## 2021-06-16 DIAGNOSIS — F43.21 ADJUSTMENT DISORDER WITH DEPRESSED MOOD: ICD-10-CM

## 2021-06-16 DIAGNOSIS — Z86.73 HISTORY OF ARTERIAL ISCHEMIC STROKE: ICD-10-CM

## 2021-06-16 PROCEDURE — 99397 PER PM REEVAL EST PAT 65+ YR: CPT | Performed by: INTERNAL MEDICINE

## 2021-06-16 RX ORDER — PAROXETINE HYDROCHLORIDE HEMIHYDRATE 12.5 MG/1
12.5 TABLET, FILM COATED, EXTENDED RELEASE ORAL EVERY MORNING
Qty: 90 TABLET | Refills: 3 | Status: SHIPPED | OUTPATIENT
Start: 2021-06-16 | End: 2022-09-20

## 2021-06-16 RX ORDER — NYSTATIN 100000 U/G
CREAM TOPICAL 2 TIMES DAILY
Qty: 60 G | Refills: 1 | Status: SHIPPED | OUTPATIENT
Start: 2021-06-16

## 2021-06-16 ASSESSMENT — ACTIVITIES OF DAILY LIVING (ADL): CURRENT_FUNCTION: NO ASSISTANCE NEEDED

## 2021-06-16 ASSESSMENT — MIFFLIN-ST. JEOR: SCORE: 1724.68

## 2021-06-16 ASSESSMENT — PAIN SCALES - GENERAL: PAINLEVEL: NO PAIN (0)

## 2021-06-16 NOTE — PROGRESS NOTES
"SUBJECTIVE:   Dylon Barragan is a 71 year old male who presents for Preventive Visit.      Patient has been advised of split billing requirements and indicates understanding: Yes   Are you in the first 12 months of your Medicare coverage?  No    Healthy Habits:     In general, how would you rate your overall health?  Good    Frequency of exercise:  None    Duration of exercise:  Less than 15 minutes    Do you usually eat at least 4 servings of fruit and vegetables a day, include whole grains    & fiber and avoid regularly eating high fat or \"junk\" foods?  No    Taking medications regularly:  Yes    Barriers to taking medications:  None    Medication side effects:  None    Ability to successfully perform activities of daily living:  No assistance needed    Home Safety:  No safety concerns identified    Hearing Impairment:  No hearing concerns    In the past 6 months, have you been bothered by leaking of urine?  No    In general, how would you rate your overall mental or emotional health?  Good      PHQ-2 Total Score: 0    Additional concerns today:  Yes    Had mild covid last November then got vaccinated.      Only had half a swallow since stroke in 1999, now swallowing is worse. Not getting things down completely, congestion in his throat.      Still sees neurology, Dr Ulloa, had virtual visit 3 months ago. Memory concerns and balance.    Memory, not getting lost, issues with retelling story or appt dates.      Fell last year when out in the yard, could only go walking one way. Hard to get up.    Still active cutting grass and snowblowing.      Do you feel safe in your environment? Yes    Have you ever done Advance Care Planning? (For example, a Health Directive, POLST, or a discussion with a medical provider or your loved ones about your wishes): Yes, patient states has an Advance Care Planning document and will bring a copy to the clinic.       Fall risk  Fallen 2 or more times in the past year?: Yes  Any fall " with injury in the past year?: No    Cognitive Screening   1) Repeat 3 items (Leader, Season, Table)    2) Clock draw: NORMAL  3) 3 item recall: Recalls 3 objects  Results: 3 items recalled: COGNITIVE IMPAIRMENT LESS LIKELY    Mini-CogTM Copyright S Katia. Licensed by the author for use in [facility]; reprinted with permission (ginette@[email or web address]). All rights reserved.      Do you have sleep apnea, excessive snoring or daytime drowsiness?: yes    Reviewed and updated as needed this visit by clinical staff  Tobacco   Meds              Reviewed and updated as needed this visit by Provider                Social History     Tobacco Use     Smoking status: Former Smoker     Packs/day: 1.00     Years: 20.00     Pack years: 20.00     Types: Cigarettes     Quit date: 1985     Years since quittin.2     Smokeless tobacco: Never Used     Tobacco comment: spouse smokes outside   Substance Use Topics     Alcohol use: Yes     Alcohol/week: 0.0 standard drinks     Comment: 6 per year     If you drink alcohol do you typically have >3 drinks per day or >7 drinks per week? No    Alcohol Use 10/19/2016   Prescreen: >3 drinks/day or >7 drinks/week? The patient does not drink >3 drinks per day nor >7 drinks per week.       Current providers sharing in care for this patient include:   Patient Care Team:  Orville Galeas MD as PCP - General  Orville Galeas MD as Assigned PCP    The following health maintenance items are reviewed in Epic and correct as of today:  Health Maintenance Due   Topic Date Due     ANNUAL REVIEW OF  ORDERS  Never done     ZOSTER IMMUNIZATION (1 of 2) Never done     ASTHMA ACTION PLAN  2016     ADVANCE CARE PLANNING  2017     MEDICARE ANNUAL WELLNESS VISIT  10/19/2017     FALL RISK ASSESSMENT  10/19/2017     MICROALBUMIN  2019     Labs reviewed in EPIC  Pneumonia Vaccine:up to date    Review of Systems  CONSTITUTIONAL: NEGATIVE for fever, chills, change in  "weight  INTEGUMENTARY/SKIN: NEGATIVE for worrisome rashes, moles or lesions  EYES: NEGATIVE for vision changes or irritation  ENT/MOUTH: swallowing issues, food and congestion  RESP: NEGATIVE for significant cough or SOB  CV: NEGATIVE for chest pain, palpitations or peripheral edema  GI: NEGATIVE for nausea, abdominal pain, heartburn, or change in bowel habits  : NEGATIVE for frequency, dysuria, or hematuria  MUSCULOSKELETAL: NEGATIVE for significant arthralgias or myalgia  NEURO: chronic right side weakness foot, swallow issues.   ENDOCRINE: NEGATIVE for temperature intolerance, skin/hair changes  HEME: NEGATIVE for bleeding problems  PSYCHIATRIC: NEGATIVE for changes in mood or affect    OBJECTIVE:   /84   Pulse 66   Temp 97.2  F (36.2  C) (Temporal)   Resp 16   Ht 1.803 m (5' 11\")   Wt 95.3 kg (210 lb)   SpO2 96%   BMI 29.29 kg/m   Estimated body mass index is 29.29 kg/m  as calculated from the following:    Height as of this encounter: 1.803 m (5' 11\").    Weight as of this encounter: 95.3 kg (210 lb).  Physical Exam  GENERAL: healthy, alert and no distress  EYES: Eyes grossly normal to inspection, PERRL and conjunctivae and sclerae normal  HENT: ear canals and TM's normal, nose and mouth without ulcers or lesions  NECK: no adenopathy, no asymmetry, masses, or scars and thyroid normal to palpation  RESP: lungs clear to auscultation - no rales, rhonchi or wheezes  CV: regular rate and rhythm, normal S1 S2, no S3 or S4, no murmur, click or rub, no peripheral edema and peripheral pulses strong  ABDOMEN: soft, nontender, no hepatosplenomegaly, no masses and bowel sounds normal  MS: slight swelling in the right foot,   SKIN: no suspicious lesions or rashes  NEURO: slight weakness in right lower leg.   PSYCH: mentation appears normal, affect normal/bright    Diagnostic Test Results:  Labs reviewed in Epic    ASSESSMENT / PLAN:       ICD-10-CM    1. Medicare annual wellness visit, subsequent  Z00.00  " "  2. Pharyngoesophageal dysphagia  R13.14 Speech Therapy Referral     XR Video Swallow with SLP or OT - Order with Speech Therapy Referral   3. Adjustment disorder with depressed mood  F43.21 PARoxetine (PAXIL-CR) 12.5 MG 24 hr tablet   4. Rash and nonspecific skin eruption  R21 nystatin (MYCOSTATIN) 016382 UNIT/GM external cream   5. Essential hypertension with goal blood pressure less than 130/80  I10    6. Anxiety  F41.9    7. History of arterial ischemic stroke  Z86.73      Patient is overall doing okay.  He had mild Covid this fall but he has had the vaccine now.  He has had a stroke in 1999 which affected the right side of his body.  He has a little bit of right foot weakness.  He is got some issues with swallowing.  He will be set up for speech therapy and a video swallow to evaluate this.  He will follow up with his neurologist later this year.  New pressure he has some anxiety which is stable uses clonazepam occasionally.    Hypertension is controlled well at 126/84 continue his medications.    Labs were reviewed and stable with normal are very well controlled cholesterol slightly elevated glucose at 125 and normal electrolytes.    We will continue his Paxil for his depression anxiety.  That medication is refilled today.        Patient has been advised of split billing requirements and indicates understanding: Yes  COUNSELING:  Reviewed preventive health counseling, as reflected in patient instructions       Regular exercise       Healthy diet/nutrition    Estimated body mass index is 29.29 kg/m  as calculated from the following:    Height as of this encounter: 1.803 m (5' 11\").    Weight as of this encounter: 95.3 kg (210 lb).    Weight management plan: Discussed healthy diet and exercise guidelines    He reports that he quit smoking about 36 years ago. His smoking use included cigarettes. He has a 20.00 pack-year smoking history. He has never used smokeless tobacco.      Appropriate preventive services " were discussed with this patient, including applicable screening as appropriate for cardiovascular disease, diabetes, osteopenia/osteoporosis, and glaucoma.  As appropriate for age/gender, discussed screening for colorectal cancer, prostate cancer, breast cancer, and cervical cancer. Checklist reviewing preventive services available has been given to the patient.    Reviewed patients plan of care and provided an AVS. The Basic Care Plan (routine screening as documented in Health Maintenance) for Dylon meets the Care Plan requirement. This Care Plan has been established and reviewed with the Patient.    Counseling Resources:  ATP IV Guidelines  Pooled Cohorts Equation Calculator  Breast Cancer Risk Calculator  Breast Cancer: Medication to Reduce Risk  FRAX Risk Assessment  ICSI Preventive Guidelines  Dietary Guidelines for Americans, 2010  USDA's MyPlate  ASA Prophylaxis  Lung CA Screening    Orville Galeas MD  [facility]    Identified Health Risks:

## 2021-07-02 ENCOUNTER — TELEPHONE (OUTPATIENT)
Dept: INTERNAL MEDICINE | Facility: CLINIC | Age: 71
End: 2021-07-02

## 2021-07-02 ENCOUNTER — HOSPITAL ENCOUNTER (OUTPATIENT)
Dept: SPEECH THERAPY | Facility: CLINIC | Age: 71
Setting detail: THERAPIES SERIES
End: 2021-07-02
Attending: INTERNAL MEDICINE
Payer: COMMERCIAL

## 2021-07-02 ENCOUNTER — HOSPITAL ENCOUNTER (OUTPATIENT)
Dept: GENERAL RADIOLOGY | Facility: CLINIC | Age: 71
Discharge: HOME | End: 2021-07-02
Attending: INTERNAL MEDICINE | Admitting: INTERNAL MEDICINE
Payer: COMMERCIAL

## 2021-07-02 DIAGNOSIS — R13.14 PHARYNGOESOPHAGEAL DYSPHAGIA: ICD-10-CM

## 2021-07-02 PROCEDURE — 92610 EVALUATE SWALLOWING FUNCTION: CPT | Mod: GN | Performed by: SPEECH-LANGUAGE PATHOLOGIST

## 2021-07-02 PROCEDURE — 92611 MOTION FLUOROSCOPY/SWALLOW: CPT | Mod: GN | Performed by: SPEECH-LANGUAGE PATHOLOGIST

## 2021-07-02 PROCEDURE — 74230 X-RAY XM SWLNG FUNCJ C+: CPT

## 2021-07-02 RX ORDER — BARIUM SULFATE 400 MG/ML
1 SUSPENSION ORAL ONCE
Status: COMPLETED | OUTPATIENT
Start: 2021-07-02 | End: 2021-07-02

## 2021-07-02 RX ADMIN — BARIUM SULFATE 1 ML: 400 SUSPENSION ORAL at 08:09

## 2021-07-02 NOTE — PROGRESS NOTES
[facility]          OUTPATIENT SWALLOW  EVALUATION  PLAN OF TREATMENT FOR OUTPATIENT REHABILITATION  (COMPLETE FOR INITIAL CLAIMS ONLY)  Patient's Last Name, First Name, M.I.  YOB: 1950  Dylon Barragan     Provider's Name   [facility]   Medical Record No.  [id number]     Start of Care Date:  07/02/21   Onset Date:  06/16/21   Type:     ___PT   ____OT  ___X_SLP Medical Diagnosis:  mild-moderate oropharyngeal dysphagia     Treatment Diagnosis:  mild-moderate oropharyngeal dysphagia Visits from SOC:  1     _________________________________________________________________________________  Plan of Treatment/Functional Goals:  Planned Therapy Interventions: Dysphagia Treatment  Dysphagia treatment: Compensatory strategies for swallowing, Instruction of safe swallow strategies, Oropharyngeal exercise training              Intervention Comments: Patient would benefit from skilled intervention to increase overall safety of oral intake and reduce risk of choking and aspiration.       Goals   1. Goal Identifier: Bolus Size       Goal Description: Patient will take sips/bites of less than 1.5 teaspoon in size to 90% accuracy without cues.       Target Date: 09/28/21           2. Goal Identifier: Chin Tuck       Goal Description: Patient will tuck chin when drinking liquids to 90% accuracy without cues.       Target Date: 09/28/21           3. Goal Identifier: Head Turn       Goal Description: Patient will turn head to right when drinking/eating to 90% accuracy with minimal cues       Target Date: 09/28/21                            Therapy Frequency: (weekly)  Predicted Duration of Therapy Intervention (days/wks): 4 weeks    Monique Hernandez MA, CCC-SLP       I CERTIFY THE NEED FOR THESE SERVICES FURNISHED UNDER        THIS PLAN OF TREATMENT AND WHILE UNDER MY CARE     (Physician co-signature of  this document indicates review and certification of the therapy plan).                  Certification date from: 07/02/21 Certification date to: 09/28/21          Referring Physician: Orville Galeas MD    Initial Assessment        See Epic Evaluation Start Of Care Date: 07/02/21

## 2021-07-02 NOTE — PROGRESS NOTES
"   07/02/21 0800       Present No   General Information   Type Of Visit Initial   Start Of Care Date 07/02/21   Referring Physician Orville Galeas MD   Orders Evaluate And Treat   Medical Diagnosis Dysphagia   Onset Of Illness/injury Or Date Of Surgery 06/16/21   Precautions/limitations Fall Precautions   Hearing WFLs   Pertinent History of Current Problem/OT: Additional Occupational Profile Info Dylon is a 71 year old gentleman who was accompanied today by his wife.  He is being referred by his primary care physician for a videofloroscopy swallowing evaluation to assess the function of his swallowing abilities.  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  He is being referred for a VFSS today due to further difficulties swallowing.  Patient reports that in 1999, when he first had his stroke, that he was unable to eat or drink and received his nutrition via a feeding tube.  Patient reports that the feeding tube was removed after approximately 2 months and that he has had 2 prior VFSS studies: one right after the stroke resulting in the placement of the feeding tube and another one approx. 2 months later following dysphagia therapy, and the feeding tube was removed.  Dylon reports that over the years since his feeding tube has been removed, \"I pretty much eat everything except nut but every day, multiple times a day, a cough and choke and at times, have to learn forward and cough something up because things get caught in my throat.\"  \"I've only had   a swallow since my stroke in 1999 and now my swallowing is getting worse.\"  Patient does not report any weight loss or pain with his swallowing difficulties and no history of pneumonia.  He reports that approximately a year ago when mowing his lawn, he fell down a slight embankment and was unable to get up.  When his wife arrived, she and a neighbor were eventually able to get him up but that " "he presented with right sided weakness.  They did not seek medical treatment at the time.  Patient reports that he is on a regular diet and drinks thin liquids and avoids nuts.  He reports that he takes his pills whole with water and does have difficulties swallowing his pills.  A VFSS is being completed today to assess the function of this patient's swallowing mechanism, to identify any effective compensatory swallowing strategies, and to determine safest diet for oral intake.      Respiratory Status Room air   Prior Level Of Function Swallowing   Prior Level Of Function Comment avoids nuts   Patient Role/employment History Retired   Living Environment [place]   General Observations Patient is alert and cooperative with the evaluation.     Patient/family Goals \"I want to figure out what is going on since I only have half a swallow left.\"   Pain Assessment   Pain Reported No   Fall Risk Screen   Fall screen completed by SLP   Have you fallen 2 or more times in the past year? Yes   Have you fallen and had an injury in the past year? Yes   Is patient a fall risk? Yes;Department fall risk interventions implemented   Abuse Screen (yes response referral indicated)   Feels Unsafe at Home or Work/School no   Feels Threatened by Someone no   Does Anyone Try to Keep You From Having Contact with Others or Doing Things Outside Your Home? no   Physical Signs of Abuse Present no   Clinical Swallow Evaluation   Oral Musculature generally intact   Structural Abnormalities none present   Dentition present and adequate   Mucosal Quality good   Mandibular Strength and Mobility intact   Oral Labial Strength and Mobility WFL;impaired retraction   Lingual Strength and Mobility WFL;impaired right lateral movement   Velar Elevation intact   Buccal Strength and Mobility intact   Laryngeal Function Cough;Throat clear;Swallow;Voicing initiated;Dry swallow palpated   Oral Musculature Comments Slight right sided weakness noted in labial " and lingual functions.  Swallow slightly delayed.  Cough and throat clear productive and timely.     Additional evaluation(s) completed today Yes;Recommended   Rationale for completing additional evaluation A VFSS is being completed today to assess the function of this patient's swallowing mechanism, to identify any effective compensatory swallowing strategies, and to determine safest diet for oral intake.      VFSS Evaluation   VFSS Additional Documentation Yes   VFSS Eval: Radiology   Radiologist Dr. Isaak Galvan   Views Taken left lateral;A/P   Physical Location of Procedure [facility]   VFSS Eval: Thin Liquid Texture Trial   Mode of Presentation, Thin Liquid cup;self-fed   Order of Presentation 1, 2, 6, 7, 11, 14, 15   Preparatory Phase WFL;Poor bolus control   Oral Phase, Thin Liquid Premature pharyngeal entry   Pharyngeal Phase, Thin Liquid Delayed swallow reflex;Pharyngeal wall coating;Residue in valleculae;Residue in pyriform sinus   Rosenbek's Penetration Aspiration Scale: Thin Liquid Trial Results 2 - contrast enters airway, remains above the vocal cords, no residue remains (penetration)   Diagnostic Statement transient penetration across all trials with less amount penetrated when chin tuck utilized   VFSS Eval: Nectar Thick Liquid Texture Trial   Mode of Presentation, Nectar cup;self-fed   Order of Presentation 3, 4   Preparatory Phase WFL;Poor bolus control   Oral Phase, Nectar WFL;Premature pharyngeal entry   Pharyngeal Phase, Nectar Delayed swallow reflex;Pharyngeal wall coating;Residue in valleculae;Residue in pyriform sinus   Rosenbek's Penetration Aspiration Scale: Nectar-Thick Liquid Trial Results 2 - contrast enters airway, remains above the vocal cords, no residue remains (penetration)   Diagnostic Statement transient penetration across all trials with less amount penetrated when chin tuck utilized   VFSS Eval: Puree Solid Texture Trial   Mode of Presentation, Puree  spoon;self-fed   Order of Presentation 5   Preparatory Phase WFL   Oral Phase, Puree WFL;Premature pharyngeal entry   Pharyngeal Phase, Puree WFL;Delayed swallow reflex;Pharyngeal wall coating;Residue in valleculae;Residue in pyriform sinus   Rosenbek's Penetration Aspiration Scale: Puree Food Trial Results 1 - no aspiration, contrast does not enter airway   Diagnostic Statement no aspiration or penetration; significant residual in pharynx following initial swallow resulting in independent multiple swallows, effectively clearing pharyngeal area    VFSS Eval: Semisolid Texture Trial   Mode of Presentation, Semisolid spoon;self-fed   Order of Presentation 8, 9, 10   Preparatory Phase WFL;Poor bolus control   Oral Phase, Semisolid WFL;Premature pharyngeal entry   Pharyngeal Phase, Semisolid WFL;Delayed swallow reflex;Pharyngeal wall coating;Residue in valleculae;Residue in pyriform sinus   Rosenbek's Penetration Aspiration Scale: Semisolid Food Trial Results 1 - no aspiration, contrast does not enter airway   Diagnostic Statement no aspiration or penetration; significant residual in pharynx following initial swallow resulting in independent multiple swallows, effectively clearing pharyngeal area    VFSS Eval: Solid Food Texture Trial   Mode of Presentation, Solid spoon;self-fed   Order of Presentation 12, 13   Preparatory Phase WFL;Poor bolus control   Oral Phase, Solid WFL;Premature pharyngeal entry   Pharyngeal Phase, Solid WFL;Delayed swallow reflex;Pharyngeal wall coating;Residue in valleculae;Residue in pyriform sinus   Rosenbek's Penetration Aspiration Scale: Solid Food Trial Results 1 - no aspiration, contrast does not enter airway   Diagnostic Statement no aspiration or penetration; significant residual in pharynx following initial swallow resulting in independent multiple swallows, effectively clearing pharyngeal area    Swallow Compensations   Swallow Compensations Alternate viscosity of consistencies;Chin  tuck;Head turn right;Effortful swallow;Pacing;Reduce amounts  (multiple and consecutive swallows)   Results No difficulties noted   Educational Assessment   Barriers to Learning No barriers   Preferred Learning Style Pictures/video   Esophageal Phase of Swallow   Patient reports or presents with symptoms of esophageal dysphagia No   Esophageal sweep performed during today s vidofluoroscopic exam  Please refer to radiologist's report for details   General Therapy Interventions   Planned Therapy Interventions Dysphagia Treatment   Dysphagia treatment Compensatory strategies for swallowing;Instruction of safe swallow strategies;Oropharyngeal exercise training   Intervention Comments Patient would benefit from skilled intervention to increase overall safety of oral intake and reduce risk of choking and aspiration.    Swallow Eval: Clinical Impressions   Skilled Criteria for Therapy Intervention Skilled criteria met.  Treatment indicated.   Functional Assessment Scale (FAS) 4   Dysphagia Outcome Severity Scale (CHANDA) Level 4 - CHANDA   Treatment Diagnosis mild-moderate oropharyngeal dysphagia   Diet texture recommendations Regular diet;Thin liquids   Recommended Feeding/Eating Techniques alternate between small bites and sips of food/liquid;maintain upright posture during/after eating for 30 mins;no straws;small sips/bites;tuck chin during every swallow;turn head right during every swallow  (3-4 swallows per bite/sip)   Rehab Potential good, to achieve stated therapy goals   Therapy Frequency   (weekly)   Predicted Duration of Therapy Intervention (days/wks) 4 weeks   Anticipated Discharge Disposition home   Risks and Benefits of Treatment have been explained. Yes   Patient, family and/or staff in agreement with Plan of Care Yes   Clinical Impression Comments Completed VFSS per MD order.  Trials included thin liquids, nectar thick liquids, pureed, semi-solid, and solid foods, and barium tablet.  Left lateral and AP views  were completed.  Patient was seen in upright sitting position and participated fully with the procedure.  Patient presents with mild-moderate oropharyngeal dysphagia with difficulties characterized by poor bolus control, premature spilage of bolus over base of tongue, delayed initiation of the swallow, ineffective airway closure, and pharyngeal residual.  Patient did not aspirate any food or drink trials, however, did consistently penetrated all liquid (thin and nectar thick) trials.  Without any compensatory strategies, penetration of thin and nectar liquid was deep and transient.  When chin tuck and head turn to the right, penetrated amount significantly reduced to small amount of transient penetration.  Across all trials, patient did present with premature spillage of approx. 25% of bolus trials over base of tongue prior to the initiation of the swallow and significant pharyngeal residual throughout the pharynx due to reduced peristaltic wave.  This residual cleared with immediate consecutive swallows, requiring average 4 swallows before pharynx cleared.  AP views showed significant residual on left pharyngeal wall and pyriform sinus, clearing with head turn to the right and multiple swallows.    No cricopharyngeal bar noted.  Recommend that patient continue with a regular diet, avoiding known choking hazards, such as nuts.  Recommend thin liquids with bolus size being less than 2 teaspoons and swallowed one at a time, with pause between swallows for additional dry swallows.  Recommend not using straws.  Pills to be taken whole, one at a time, with a food carrier such as applesauce or pudding.  Reviewed video results with patient and his wife, identifying swallowing stages and swallowing difficulties and corresponding compensatory swallowing strategies and safe swallow choices.  Patient and his wife indicated good understanding of results and recommendations via discussion and Q&A.  Thank you for this referral.      Swallow Goals   SLP Swallow Goals 1;2;3   Swallow Goal 1   Goal Identifier Bolus Size   Goal Description Patient will take sips/bites of less than 1.5 teaspoon in size to 90% accuracy without cues.   Target Date 09/28/21   Swallow Goal 2   Goal Identifier Chin Tuck   Goal Description Patient will tuck chin when drinking liquids to 90% accuracy without cues.   Target Date 09/28/21   Swallow Goal 3   Goal Identifier Head Turn   Goal Description Patient will turn head to right when drinking/eating to 90% accuracy with minimal cues   Target Date 09/28/21   Total Session Time   SLP Eval: oral/pharyngeal swallow function, clinical minutes (12323) 18   SLP Eval: VideoFluoroscopic Swallow function Minutes (99008) 20   Total Evaluation Time 38   Therapy Certification   Certification date from 07/02/21   Certification date to 09/28/21   Medical Diagnosis mild-moderate oropharyngeal dysphagia   Certification I certify the need for these services furnished under this plan of treatment and while under my care.  (Physician co-signature of this document indicates review and certification of the therapy plan).

## 2021-07-02 NOTE — TELEPHONE ENCOUNTER
Our goal is to have forms completed with 72 hours, however some forms may require a visit or additional information.    Who is the form from?: Patient  Where the form came from: Patient or family brought in     What clinic location was the form placed at?: [place]  Where the form was placed: [facility]  What number is listed as a contact on the form?: [phone]    Phone call message- patient request for a letter, form or note:    Date needed: as soon as possible  Please mail to address on form  Has the patient signed a consent form for release of information? Not Applicable    Additional comments: NA    Call taken on 7/2/2021 at 7:59 AM by Bonnie Hays    Type of letter, form or note: [facility] form

## 2021-07-17 ENCOUNTER — OFFICE VISIT (OUTPATIENT)
Dept: URGENT CARE | Facility: URGENT CARE | Age: 71
End: 2021-07-17
Payer: COMMERCIAL

## 2021-07-17 VITALS
TEMPERATURE: 97.7 F | OXYGEN SATURATION: 98 % | SYSTOLIC BLOOD PRESSURE: 138 MMHG | DIASTOLIC BLOOD PRESSURE: 80 MMHG | HEART RATE: 69 BPM

## 2021-07-17 DIAGNOSIS — R07.0 THROAT PAIN: ICD-10-CM

## 2021-07-17 DIAGNOSIS — J22 LOWER RESPIRATORY INFECTION: Primary | ICD-10-CM

## 2021-07-17 LAB
DEPRECATED S PYO AG THROAT QL EIA: NEGATIVE
GROUP A STREP BY PCR: NOT DETECTED

## 2021-07-17 PROCEDURE — 87651 STREP A DNA AMP PROBE: CPT | Performed by: FAMILY MEDICINE

## 2021-07-17 PROCEDURE — 99213 OFFICE O/P EST LOW 20 MIN: CPT | Performed by: FAMILY MEDICINE

## 2021-07-17 PROCEDURE — U0003 INFECTIOUS AGENT DETECTION BY NUCLEIC ACID (DNA OR RNA); SEVERE ACUTE RESPIRATORY SYNDROME CORONAVIRUS 2 (SARS-COV-2) (CORONAVIRUS DISEASE [COVID-19]), AMPLIFIED PROBE TECHNIQUE, MAKING USE OF HIGH THROUGHPUT TECHNOLOGIES AS DESCRIBED BY CMS-2020-01-R: HCPCS | Performed by: FAMILY MEDICINE

## 2021-07-17 PROCEDURE — U0005 INFEC AGEN DETEC AMPLI PROBE: HCPCS | Performed by: FAMILY MEDICINE

## 2021-07-17 RX ORDER — AZITHROMYCIN 250 MG/1
TABLET, FILM COATED ORAL
Qty: 6 TABLET | Refills: 0 | Status: SHIPPED | OUTPATIENT
Start: 2021-07-17 | End: 2021-08-03

## 2021-07-17 NOTE — PROGRESS NOTES
Assessment & Plan     (J22) Lower respiratory infection  (primary encounter diagnosis)  Comment: Differentials discussed in detail including lower respiratory tract infection/bronchitis.  Rapid strep negative, COVID-19 test ordered for further evaluation.  Wells score 0.  Treatment options discussed.  History of stroke, obstructive sleep apnea and asthma.  Shared decision made to start antibiotic considering severity of symptoms and comorbid conditions.  Recommended to continue other medications, albuterol inhaler, well hydration, warm fluids.  Follow-up if symptoms persist or worsen.  Patient understood and in agreement with above plan.  All questions answered.    (R07.0) Throat pain  Comment:   Plan: Streptococcus A Rapid Screen w/Reflex to PCR -         Clinic Collect, Group A Streptococcus PCR         Throat Swab, azithromycin (ZITHROMAX) 250 MG         tablet, Symptomatic COVID-19 Virus         (Coronavirus) by PCR Nasopharyngeal        Smith Parmar MD  [facility] [place]    [facility]   Tyler is a 71 year old who presents for the following health issues  accompanied by his spouse:    HPI     Concern -   Onset: yesterday   Description: chest congestion, productive cough and sore throat  Intensity: moderate  Progression of Symptoms:  same  Accompanying Signs & Symptoms: no fever, chills, sob, chest pain  Previous history of similar problem: h/o stroke   Therapies tried and outcome: albuterol        Review of Systems   Constitutional, HEENT, cardiovascular, pulmonary, gi and gu systems are negative, except as otherwise noted.      Objective    /80   Pulse 69   Temp 97.7  F (36.5  C) (Tympanic)   SpO2 98%   There is no height or weight on file to calculate BMI.  Physical Exam   GENERAL: healthy, alert and no distress  EYES: Eyes grossly normal to inspection, PERRL and conjunctivae and sclerae normal  HENT: ear canals and TM's normal, nose and mouth without ulcers or lesions  NECK:  no adenopathy, no asymmetry, masses, or scars and thyroid normal to palpation  RESP: lungs clear to auscultation - no rales, rhonchi or wheezes  CV: regular rates and rhythm, normal S1 S2, no S3 or S4 and no murmur, click or rub  ABDOMEN: soft, nontender  MS: no gross musculoskeletal defects noted, no edema  NEURO: Normal strength and tone, mentation intact and speech normal

## 2021-07-18 LAB — SARS-COV-2 RNA RESP QL NAA+PROBE: NEGATIVE

## 2021-07-19 ENCOUNTER — TELEPHONE (OUTPATIENT)
Dept: INTERNAL MEDICINE | Facility: CLINIC | Age: 71
End: 2021-07-19

## 2021-07-19 ENCOUNTER — VIRTUAL VISIT (OUTPATIENT)
Dept: INTERNAL MEDICINE | Facility: CLINIC | Age: 71
End: 2021-07-19
Payer: COMMERCIAL

## 2021-07-19 DIAGNOSIS — J40 BRONCHITIS: Primary | ICD-10-CM

## 2021-07-19 PROCEDURE — 99441 PR PHYSICIAN TELEPHONE EVALUATION 5-10 MIN: CPT | Mod: 95 | Performed by: INTERNAL MEDICINE

## 2021-07-19 RX ORDER — PREDNISONE 20 MG/1
40 TABLET ORAL DAILY
Qty: 10 TABLET | Refills: 0 | Status: SHIPPED | OUTPATIENT
Start: 2021-07-19 | End: 2021-08-03

## 2021-07-19 NOTE — TELEPHONE ENCOUNTER
Reason for call:  Patient reporting a symptom    Symptom or request: steroid    Duration (how long have symptoms been present): 4 days    Have you been treated for this before? Yes    Additional comments: started on Z pack on Saturday. Feels he might be a little bit better. Nights are bad, no sleep, hard to breath when he lays down. Wonders if he should be taking a steroid also. He has been prescribed both in the past.  [facility] pharmacy    Phone Number patient can be reached at:  Home number on file [phone] (home)    Best Time:      Can we leave a detailed message on this number:  YES    Call taken on 7/19/2021 at 9:46 AM by Effie Moser

## 2021-07-19 NOTE — PROGRESS NOTES
Tyler is a 71 year old who is being evaluated via a billable telephone visit.      What phone number would you like to be contacted at? [phone]  How would you like to obtain your AVS? MyChart    Assessment & Plan     Bronchitis  Patient has bronchitis he was at the urgent care on Saturday.  He is getting a little bit better but not much not able to cough things up we will add prednisone 20 mg 2 pills a day for 5 days.  We did discuss the possibility of CHF and fluid but is not having a lot of swelling this seems to be more congestion and worse with position.  - predniSONE (DELTASONE) 20 MG tablet; Take 2 tablets (40 mg) by mouth daily                 No follow-ups on file.    Orville Galeas MD  [facility]   Tyler is a 71 year old who presents for the following health issues     HPI     Chief Complaint   Patient presents with     Telephone     seen at urgent care on saturday and put on a zpak for lower respiratory infection, pt wondering about being put on a steroid     Was at Urgent Care in [place] 2 days ago. Had cough and congestion.  During the day is better, tired.  Position makes it harder to breath,     Hard to breath more at night.  Hard to sleep, doesn't cough, hard to cough.  Any phlegm is green and hard to get out.     Was given a zpak. He can feel sinus fill and then release at times.      Negative strep and covid test on the 17th.        Review of Systems         Objective    Vitals - Patient Reported  Pain Score: Moderate Pain (5)  Pain Loc: Mid Back        Physical Exam   alert and mild distress  PSYCH: Alert and oriented times 3; coherent speech, normal   rate and volume, able to articulate logical thoughts, able   to abstract reason, no tangential thoughts, no hallucinations   or delusions  His affect is normal  RESP: No cough, no audible wheezing, able to talk in full sentences  Remainder of exam unable to be completed due to telephone visits      Viewed  urgent care labs with negative strep test and negative Covid test.          Phone call duration: 7 minutes

## 2021-07-22 ENCOUNTER — TELEPHONE (OUTPATIENT)
Dept: INTERNAL MEDICINE | Facility: CLINIC | Age: 71
End: 2021-07-22

## 2021-07-22 NOTE — TELEPHONE ENCOUNTER
Patient states he has a virtual visit on 7/19/21 with PCP.  He was seen in the  for lower respiratory infection.  Patient states he just finished his Z-pack and has a few more days of Prednisone.    He states that jessie to his previous stroke and co-morbitities, he has a hard time coughing, and there seems to be a lot of rattling in his throat and upper bronchial area.      He is wanting to know why his thermometer says his temp is 96.8.  It has been in the 97-98 area, but today was lower.  He is informed the RN does not know why the temp read that low.  His O2 was 92%, but was brought up to 96% with breathing deeply.  He is informed the Z-pack will continue to work for multiple days, but that a message will be sent to see if PCP wants patient to follow up for this continuing issue.  Routing to PCP for further advice.    Megan Cavazos RN

## 2021-07-22 NOTE — TELEPHONE ENCOUNTER
That temp is fine, it can vary some, worry if up over 100 degrees. Agree the zpak will continue to work.

## 2021-08-02 ENCOUNTER — TELEPHONE (OUTPATIENT)
Dept: INTERNAL MEDICINE | Facility: CLINIC | Age: 71
End: 2021-08-02

## 2021-08-02 NOTE — TELEPHONE ENCOUNTER
I would be more comfortable for Dr. Barahona to see this patient given the complexity of his health, especially if he can see him tomorrow morning.     Vikram Rodarte PA-C on 8/2/2021 at 1:44 PM

## 2021-08-02 NOTE — TELEPHONE ENCOUNTER
Reason for call:  Patient reporting a symptom    Symptom or request: Symptoms    Duration (how long have symptoms been present): on going    Have you been treated for this before? Yes    Additional comments: Pt calling for he has been dealing with an upper respiratory concern and would like a call back for further advisement as soon as possible.    Phone Number patient can be reached at:  Home number on file [phone] (home)    Best Time:  anytime    Can we leave a detailed message on this number:  YES    Call taken on 8/2/2021 at 8:00 AM by Silvestre Monreal

## 2021-08-02 NOTE — TELEPHONE ENCOUNTER
Patient had a stroke 22 yrs ago and has lingering issues. 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  He finiished a steroid and round of antibiotics over a week ago as he felt he was getting congestion in his airway. Now he feels it is coming back. He canniot cough well due to his paralysis in airway. Can someone see him today? He see Dr Galeas and has a complicated case.  Ok to leave a detailed message.

## 2021-08-02 NOTE — TELEPHONE ENCOUNTER
Patient scheduled with Dr. Barahona 08/03/2021 at 8:30am.     Cheri Costello MA 8/2/2021  3:32 PM

## 2021-08-02 NOTE — TELEPHONE ENCOUNTER
Dr. Barahona is unable to work in patient today. If no one can see pt today, he can work in tomorrow at 8:30 am. Otherwise pt can go to the ED. Ani Morgan CMA

## 2021-08-03 ENCOUNTER — OFFICE VISIT (OUTPATIENT)
Dept: FAMILY MEDICINE | Facility: CLINIC | Age: 71
End: 2021-08-03
Payer: COMMERCIAL

## 2021-08-03 ENCOUNTER — HOSPITAL ENCOUNTER (OUTPATIENT)
Dept: GENERAL RADIOLOGY | Facility: CLINIC | Age: 71
Discharge: HOME | End: 2021-08-03
Attending: FAMILY MEDICINE | Admitting: FAMILY MEDICINE
Payer: COMMERCIAL

## 2021-08-03 VITALS
HEART RATE: 68 BPM | DIASTOLIC BLOOD PRESSURE: 74 MMHG | SYSTOLIC BLOOD PRESSURE: 132 MMHG | TEMPERATURE: 98.3 F | RESPIRATION RATE: 16 BRPM | OXYGEN SATURATION: 95 %

## 2021-08-03 DIAGNOSIS — J20.9 ACUTE BRONCHITIS, UNSPECIFIED ORGANISM: ICD-10-CM

## 2021-08-03 DIAGNOSIS — R05.9 COUGH: Primary | ICD-10-CM

## 2021-08-03 DIAGNOSIS — R05.9 COUGH: ICD-10-CM

## 2021-08-03 DIAGNOSIS — I69.998 OTHER SEQUELAE FOLLOWING UNSPECIFIED CEREBROVASCULAR DISEASE: ICD-10-CM

## 2021-08-03 PROCEDURE — 99214 OFFICE O/P EST MOD 30 MIN: CPT | Performed by: FAMILY MEDICINE

## 2021-08-03 PROCEDURE — 71046 X-RAY EXAM CHEST 2 VIEWS: CPT

## 2021-08-03 NOTE — PROGRESS NOTES
Appropriate assistive devices provided during their visit. na(Yes, No, N/A) na (list device)    Exam table and/or cart  placed in the lowest position. na (Yes, No, N/A)    Brakes on tables/carts/wheelchairs used at all times. na (Yes, No, N/A)    Non slip footwear applied. na (Yes, No, NA)    Patient was accompanied by staff throughout visit. yes (Yes, No, N/A)    Equipment safety straps used. na(Yes, No, N/A)    Assist with toileting. na (Yes, No, N/A)

## 2021-08-03 NOTE — PROGRESS NOTES
Assessment & Plan     ASSESSMENT/ORDERS:    ICD-10-CM    1. Cough  R05 XR Chest 2 Views   2. Acute bronchitis, unspecified organism  J20.9 XR Chest 2 Views   3. Other sequelae following unspecified cerebrovascular disease  I69.998      PLAN:  1.  No signs of pneumonia at this time.  I recommended he continue with as needed albuterol.  Will hold off on repeat oral prednisone at this time.  Could consider this if symptoms worsen.    2.  Discussed having him take cetirizine and try nasal rinses to help with his nasal drainage and rhinitis as this may contributing to his cough and inability to lay flat.                   No follow-ups on file.    Fernie Barahona MD  [facility] SUNNI Scott is a 71 year old who presents for the following health issues     HPI     Acute Illness  Acute illness concerns: cough  Onset/Duration: x 3 weeks  Symptoms:  Fever: no  Chills/Sweats: no  Headache (location?): no  Sinus Pressure: no  Conjunctivitis:  no  Ear Pain: no  Rhinorrhea: YES  Congestion: YES  Sore Throat: no  Cough: YES-non-productive  Wheeze: YES  Decreased Appetite: no  Nausea: no  Vomiting: no  Diarrhea: no  Dysuria/Freq.: no  Dysuria or Hematuria: no  Fatigue/Achiness: YES  Sick/Strep Exposure: no  Therapies tried and outcome: prednisone and Zithromax      Cough worse with lying flat.  Improves with sleeping sitting up.  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  Makes it difficult to cough. Steroids and antibiotic improved for a bit and finished this a week ago.  Starting to feel more congested.    Review of Systems         Objective    /74   Pulse 68   Temp 98.3  F (36.8  C)   Resp 16   SpO2 95%   There is no height or weight on file to calculate BMI.  Physical Exam  Constitutional:       Appearance: He is well-developed.   HENT:      Head: Normocephalic.      Right Ear: Tympanic membrane, ear canal and external ear normal.       Left Ear: Tympanic membrane, ear canal and external ear normal.      Nose: Mucosal edema, congestion and rhinorrhea present. No nasal tenderness.      Right Sinus: No maxillary sinus tenderness or frontal sinus tenderness.      Left Sinus: No maxillary sinus tenderness or frontal sinus tenderness.      Mouth/Throat:      Mouth: Mucous membranes are moist. No injury.      Tongue: No lesions.      Pharynx: Oropharynx is clear. No pharyngeal swelling or posterior oropharyngeal erythema.   Eyes:      General: Lids are normal.         Right eye: No discharge.         Left eye: No discharge.   Cardiovascular:      Rate and Rhythm: Normal rate and regular rhythm.      Heart sounds: Normal heart sounds, S1 normal and S2 normal. No murmur heard.     Pulmonary:      Effort: Pulmonary effort is normal. No respiratory distress.      Breath sounds: Normal breath sounds. Transmitted upper airway sounds present. No decreased air movement. No decreased breath sounds, wheezing, rhonchi or rales.   Musculoskeletal:      Cervical back: Full passive range of motion without pain.   Skin:     Findings: No rash.   Neurological:      Mental Status: He is alert.            CXR - Reviewed and interpreted by me Normal- no infiltrates, effusions, pneumothoraces, cardiomegaly or masses  Results for orders placed or performed during the hospital encounter of 08/03/21   XR Chest 2 Views     Status: None    Narrative    CHEST TWO VIEW   8/3/2021 9:54 AM     HISTORY: Cough; Acute bronchitis, unspecified organism.    COMPARISON: CT chest dated 7/9/2019, chest x-rays dated 7/17/2015.    FINDINGS:  The lungs are mildly persistently hyperaerated but are  otherwise grossly clear. Minimal blunting of left lateral costophrenic  angle is again noted and likely represents scarring given its  stability. No pneumothorax or significant pleural fluid collection. No  acute osseous fracture. Heart size and pulmonary vascularity are  within normal limits. Minimal  peribronchial cuffing could be  associated with mild bronchitis.      Impression    IMPRESSION:  1. Mild perihilar peribronchial cuffing could be associated with  bronchitis.  2. Mild hyperaeration and probable left lateral costophrenic angle  scarring.  3. No other evidence of acute cardiopulmonary disease is seen.    ADRIENNE ZAMAN MD         SYSTEM ID:  [id number]

## 2021-08-04 PROBLEM — I69.998 OTHER SEQUELAE FOLLOWING UNSPECIFIED CEREBROVASCULAR DISEASE: Status: ACTIVE | Noted: 2021-08-04

## 2021-10-18 ENCOUNTER — ALLIED HEALTH/NURSE VISIT (OUTPATIENT)
Dept: INTERNAL MEDICINE | Facility: CLINIC | Age: 71
End: 2021-10-18
Payer: COMMERCIAL

## 2021-10-18 VITALS — SYSTOLIC BLOOD PRESSURE: 128 MMHG | DIASTOLIC BLOOD PRESSURE: 64 MMHG

## 2021-10-18 DIAGNOSIS — I10 ESSENTIAL HYPERTENSION WITH GOAL BLOOD PRESSURE LESS THAN 130/80: Primary | ICD-10-CM

## 2021-10-18 PROCEDURE — 99207 PR NO CHARGE NURSE ONLY: CPT | Performed by: INTERNAL MEDICINE

## 2021-10-18 NOTE — PROGRESS NOTES
"Dylon Barragan was evaluated at [facility] on October 18, 2021 at which time his blood pressure was:    BP Readings from Last 3 Encounters:   10/18/21 128/64   08/03/21 132/74   07/17/21 138/80     Pulse Readings from Last 3 Encounters:   08/03/21 68   07/17/21 69   06/16/21 66       Pt getting high readings on home machine. When he has high readings (150/160 systolic, diastolic reading \"low\" per pt), he has a corresponding headache. He does not have headache when his reading is lower or normal. He did not have a headache during his reading today in pharmacy.    Pt advised to continue home monitoring and F/U with MD if continues to be high at home.     Symptoms: Other: headache    BP Goal:< 140/90 mmHg    BP Assessment:  BP at goal    Potential Reasons for BP too high: NA - Not applicable    BP Follow-Up Plan: Other: pt will get checked in 2 days when he comes back for flu shot    Recommendation to Provider:  none    Note completed by:     Peng Marroquin PharmD  [facility]   [phone]      "

## 2021-11-01 ENCOUNTER — TELEPHONE (OUTPATIENT)
Dept: INTERNAL MEDICINE | Facility: CLINIC | Age: 71
End: 2021-11-01

## 2021-11-01 ENCOUNTER — VIRTUAL VISIT (OUTPATIENT)
Dept: INTERNAL MEDICINE | Facility: CLINIC | Age: 71
End: 2021-11-01
Payer: COMMERCIAL

## 2021-11-01 DIAGNOSIS — G47.33 OBSTRUCTIVE SLEEP APNEA (ADULT) (PEDIATRIC): Primary | ICD-10-CM

## 2021-11-01 DIAGNOSIS — J40 BRONCHITIS: Primary | ICD-10-CM

## 2021-11-01 PROCEDURE — 99214 OFFICE O/P EST MOD 30 MIN: CPT | Mod: 95 | Performed by: INTERNAL MEDICINE

## 2021-11-01 RX ORDER — AZITHROMYCIN 250 MG/1
TABLET, FILM COATED ORAL
Qty: 6 TABLET | Refills: 0 | Status: SHIPPED | OUTPATIENT
Start: 2021-11-01 | End: 2022-04-01

## 2021-11-01 NOTE — CONFIDENTIAL NOTE
Pt called. RN set him up for a phone visit today at 11 AM.........................JOSELITO Camarillo

## 2021-11-01 NOTE — TELEPHONE ENCOUNTER
Patient has 1/2 airway that is paralyzed.    He states he has a tendency to get upper respiratory infection.    He has had a cough for 3 weeks.  He has had green discharge.    No wheezing.    He is hoping to get a zpack and prednisone today.    Please advise.    Vira Cardenas RN on 11/1/2021 at 8:05 AM

## 2021-11-01 NOTE — PROGRESS NOTES
"Tyler is a 71 year old who is being evaluated via a billable telephone visit.      What phone number would you like to be contacted at? [phone]  How would you like to obtain your AVS? MyChart    Assessment & Plan     Bronchitis  Antritis in a patient has had this before he does have some known lung disease and has an inhaler of albuterol which does help bring up the sputum.  He is also using Mucinex.  Normally he gets an antibiotic and sometimes prednisone.  Since his been 3 weeks we will give him a azithromycin.  He possibly had RSV exposure from his granddaughter.  No need to test at this time if he does not get better in the next 2 days would then put him on some oral prednisone as well but will try to avoid that at this time.  - azithromycin (ZITHROMAX) 250 MG tablet; Two tablets first day, then one tablet daily for four days.                 No follow-ups on file.    Orville Galeas MD  [facility]   Tyler is a 71 year old who presents for the following health issues     HPI     Chief Complaint   Patient presents with     Telephone     URI - Productive cough, a lot of green \"stuff\" and nasal discharge is green as well, started about three weeks ago     Been sick for 3 weeks, sputum and nasal congestion. Worse today. Green in color.  Better after clearing it up, inhaler helped bring it up.   No fever, oxygen level is 93%.    Started to get better then the last 3 days got worse, coughing.       No side contact, wife had covid booster. He had his as well.     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     Complication of anesthesia     half an airway due to stroke hx     Depressive disorder     Given meds for but think I'm ok     Dysphagia      Edema      Essential hypertension, benign      Mild intermittent asthma      Mixed hyperlipidemia      Other psoriasis "      Personal history of unspecified circulatory disease      Sleep apnea     CPAP     Unspecified cerebral artery occlusion with cerebral infarction          Ventral hernia, unspecified, without mention of obstruction or gangrene      Current Outpatient Medications   Medication     albuterol (VENTOLIN HFA) 108 (90 Base) MCG/ACT inhaler     ASPIRIN PO     atorvastatin (LIPITOR) 40 MG tablet     baclofen (LIORESAL) 20 MG tablet     clonazePAM (KLONOPIN) 0.5 MG tablet     indapamide (LOZOL) 1.25 MG tablet     order for DME     PARoxetine (PAXIL-CR) 12.5 MG 24 hr tablet     propranolol (INDERAL) 80 MG tablet     ketoconazole (NIZORAL) 2 % cream     nystatin (MYCOSTATIN) 695061 UNIT/GM external cream     No current facility-administered medications for this visit.     Social History     Tobacco Use     Smoking status: Former Smoker     Packs/day: 1.00     Years: 20.00     Pack years: 20.00     Types: Cigarettes     Quit date: 1985     Years since quittin.6     Smokeless tobacco: Never Used     Tobacco comment: spouse smokes outside   Vaping Use     Vaping Use: Never used   Substance Use Topics     Alcohol use: Yes     Alcohol/week: 0.0 standard drinks     Comment: Occasional     Drug use: Never       Review of Systems         Objective           Vitals:  No vitals were obtained today due to virtual visit.    Physical Exam   healthy, alert and no distress  PSYCH: Alert and oriented times 3; coherent speech, normal   rate and volume, able to articulate logical thoughts, able   to abstract reason, no tangential thoughts, no hallucinations   or delusions  His affect is normal  RESP: No cough, no audible wheezing, able to talk in full sentences  Remainder of exam unable to be completed due to telephone visits                Phone call duration: 6 minutes

## 2021-11-15 DIAGNOSIS — I10 ESSENTIAL HYPERTENSION WITH GOAL BLOOD PRESSURE LESS THAN 130/80: ICD-10-CM

## 2021-11-16 RX ORDER — INDAPAMIDE 1.25 MG/1
1.25 TABLET ORAL EVERY MORNING
Qty: 90 TABLET | Refills: 1 | Status: SHIPPED | OUTPATIENT
Start: 2021-11-16 | End: 2022-05-25

## 2021-11-16 NOTE — TELEPHONE ENCOUNTER
Prescription approved per [facility] Refill Protocol.  Vira Cardenas RN on 11/16/2021 at 10:26 AM

## 2021-12-13 DIAGNOSIS — E78.5 HYPERLIPIDEMIA LDL GOAL <130: ICD-10-CM

## 2021-12-14 RX ORDER — ATORVASTATIN CALCIUM 40 MG/1
40 TABLET, FILM COATED ORAL DAILY
Qty: 90 TABLET | Refills: 3 | Status: SHIPPED | OUTPATIENT
Start: 2021-12-14 | End: 2022-12-01

## 2021-12-14 NOTE — TELEPHONE ENCOUNTER
Routing refill request to provider for review/approval because:  Drug interaction warning      Jose Valenzuela,RN

## 2022-02-04 ENCOUNTER — MEDICAL CORRESPONDENCE (OUTPATIENT)
Dept: HEALTH INFORMATION MANAGEMENT | Facility: CLINIC | Age: 72
End: 2022-02-04
Payer: COMMERCIAL

## 2022-02-14 ENCOUNTER — HOSPITAL ENCOUNTER (OUTPATIENT)
Dept: PHYSICAL THERAPY | Facility: CLINIC | Age: 72
Setting detail: THERAPIES SERIES
End: 2022-02-14
Attending: PSYCHIATRY & NEUROLOGY | Admitting: INTERNAL MEDICINE
Payer: COMMERCIAL

## 2022-02-14 PROCEDURE — 97112 NEUROMUSCULAR REEDUCATION: CPT | Mod: GP | Performed by: PHYSICAL THERAPIST

## 2022-02-14 PROCEDURE — 97110 THERAPEUTIC EXERCISES: CPT | Mod: GP | Performed by: PHYSICAL THERAPIST

## 2022-02-14 PROCEDURE — 97161 PT EVAL LOW COMPLEX 20 MIN: CPT | Mod: GP | Performed by: PHYSICAL THERAPIST

## 2022-02-14 NOTE — PROGRESS NOTES
[facility]                                                                                   OUTPATIENT PHYSICAL THERAPY FUNCTIONAL EVALUATION  PLAN OF TREATMENT FOR OUTPATIENT REHABILITATION  (COMPLETE FOR INITIAL CLAIMS ONLY)  Patient's Last Name, First Name, MRODO  YOB: 1950  Dylon Barragan     Provider's Name   [facility]   Medical Record No.  [id number]     Start of Care Date:  02/14/22   Onset Date:  07/01/99   Type:     _X__PT   ____OT  ____SLP Medical Diagnosis:   Brainstem stroke.     PT Diagnosis:  Generalized weakness Visits from SOC:  1                              __________________________________________________________________________________  Plan of Treatment/Functional Goals:  motor coordination training,neuromuscular re-education,ROM,strengthening,stretching,transfer training,manual therapy     TENS     GOALS  SIMMONS  Pt will demonstrate Simmons balance assessment to 52/56 or better in order to demonstrate reduction in falls risk.  04/11/22    HEP   Pt will be independent with HEP in order to improve LE strength and balance.  04/11/22        Therapy Frequency:  1 time/week   Predicted Duration of Therapy Intervention:  8 weeks    Patrick Davis, PT                                    I CERTIFY THE NEED FOR THESE SERVICES FURNISHED UNDER        THIS PLAN OF TREATMENT AND WHILE UNDER MY CARE .             Physician Signature               Date    X_____________________________________________________                      Certification Date From:     2/14/22  Certification Date To:   4/11/22    Referring Provider:  Dr. Denise Ulloa.    Initial Assessment  See Epic Evaluation- Start of Care Date: 02/14/22

## 2022-02-14 NOTE — PROGRESS NOTES
02/14/22 1000   Quick Adds   Type of Visit Initial OP PT Evaluation   General Information   Start of Care Date 02/14/22   Referring Physician Dr. Ulloa.   Orders Evaluate and Treat as Indicated   Order Date 02/04/22   Medical Diagnosis Brain stem stroke, gait and mobility.   Onset of illness/injury or Date of Surgery 07/01/99   Precautions/Limitations no known precautions/limitations   Surgical/Medical history reviewed Yes   Pertinent history of current problem (include personal factors and/or comorbidities that impact the POC) Pt reports having a stroke 20+ years ago.  Pt reports balance has gradually worse.  Pt reports balance affects him more when he's tired or when he's turning.  Pt has had a few falls and that is usually linked to dehydration.  PMH: depression, HTN, hyperlipidemia, Cerebral artery infarction 1999.    Pertinent Visual History  Cataract in L eye.  Prisms in his glasses to help with double vision.     Prior level of function comment independent, walking stick.     Current Community Support Family/friend caregiver   Patient role/Employment history Retired   Living environment House/[facility]   Home/Community Accessibility Comments no concerns   Current Assistive Devices Walking [place]   Patient/Family Goals Statement Improve strength and balance   Fall Risk Screen   Fall screen completed by PT   Have you fallen 2 or more times in the past year? Yes   Have you fallen and had an injury in the past year? No   Timed Up and Go score (seconds) 10.5   Is patient a fall risk? No   Abuse Screen (yes response referral indicated)   Feels Unsafe at Home or Work/School no   Feels Threatened by Someone no   Does Anyone Try to Keep You From Having Contact with Others or Doing Things Outside Your Home? no   Physical Signs of Abuse Present no   Pain   Patient currently in pain No   Cognitive Status Examination   Orientation orientation to person, place and time   Level of Consciousness alert   Follows Commands  and Answers Questions 100% of the time   Personal Safety and Judgment intact   Memory intact   Posture   Posture Comments Tendency towards looking down at the floor.   Range of Motion (ROM)   ROM Comment WFL   Strength   Strength Comments Globally 4+/5, B quads 4/5 and hip flexors 4/5.   Gait   Gait Comments Mild side to side deviation while walking, wide base of support.   Balance   Balance Comments Carter/56   Sensory Examination   Sensory Perception Comments Pt reports R sided paresthesia, lack of light touch throughout R side.   Coordination   Coordination no deficits were identified   Muscle Tone   Muscle Tone no deficits were identified   Modality Interventions   Planned Modality Interventions TENS   Planned Modality Interventions Comments as needed   Planned Therapy Interventions   Planned Therapy Interventions motor coordination training;neuromuscular re-education;ROM;strengthening;stretching;transfer training;manual therapy   Clinical Impression   Criteria for Skilled Therapeutic Interventions Met yes, treatment indicated   PT Diagnosis Generalized weakness   Influenced by the following impairments Weakness, impaired balance, impaired gait.   Functional limitations due to impairments Walking, standing, turning.     Clinical Presentation Stable/Uncomplicated   Clinical Presentation Rationale Clinical judgement   Clinical Decision Making (Complexity) Low complexity   Therapy Frequency 1 time/week   Predicted Duration of Therapy Intervention (days/wks) 8 weeks   Risk & Benefits of therapy have been explained Yes   Patient, Family & other staff in agreement with plan of care Yes   Clinical Impression Comments Pt is a 72 y.o. male who presented to PT with symptoms of generalized weakness, impaired gait, and imbalance. Pt will benefit form skilled PT to improve LE strength, balance and function.   Education Assessment   Preferred Learning Style Listening;Demonstration;Pictures/video   Barriers to Learning No  barriers   GOALS   PT Eval Goals 1;2;3   Goal 1   Goal Identifier SIMMONS   Goal Description Pt will demonstrate Simmons balance assessment to 52/56 or better in order to demonstrate reduction in falls risk.   Target Date 04/11/22   Goal 2   Goal Identifier HEP    Goal Description Pt will be independent with HEP in order to improve LE strength and balance.   Target Date 04/11/22   Total Evaluation Time   PT Eval, Low Complexity Minutes (81332) 15

## 2022-02-28 ENCOUNTER — HOSPITAL ENCOUNTER (OUTPATIENT)
Dept: PHYSICAL THERAPY | Facility: CLINIC | Age: 72
Setting detail: THERAPIES SERIES
End: 2022-02-28
Attending: PSYCHIATRY & NEUROLOGY | Admitting: INTERNAL MEDICINE
Payer: COMMERCIAL

## 2022-02-28 PROCEDURE — 97110 THERAPEUTIC EXERCISES: CPT | Mod: GP | Performed by: PHYSICAL THERAPIST

## 2022-02-28 PROCEDURE — 97112 NEUROMUSCULAR REEDUCATION: CPT | Mod: GP | Performed by: PHYSICAL THERAPIST

## 2022-03-07 ENCOUNTER — HOSPITAL ENCOUNTER (OUTPATIENT)
Dept: PHYSICAL THERAPY | Facility: CLINIC | Age: 72
Setting detail: THERAPIES SERIES
End: 2022-03-07
Attending: PSYCHIATRY & NEUROLOGY | Admitting: INTERNAL MEDICINE
Payer: COMMERCIAL

## 2022-03-07 PROCEDURE — 97110 THERAPEUTIC EXERCISES: CPT | Mod: GP | Performed by: PHYSICAL THERAPIST

## 2022-03-07 PROCEDURE — 97112 NEUROMUSCULAR REEDUCATION: CPT | Mod: GP | Performed by: PHYSICAL THERAPIST

## 2022-03-16 ENCOUNTER — HOSPITAL ENCOUNTER (EMERGENCY)
Facility: CLINIC | Age: 72
Discharge: HOME | End: 2022-03-16
Attending: PHYSICIAN ASSISTANT | Admitting: PHYSICIAN ASSISTANT
Payer: COMMERCIAL

## 2022-03-16 ENCOUNTER — APPOINTMENT (OUTPATIENT)
Dept: ULTRASOUND IMAGING | Facility: CLINIC | Age: 72
End: 2022-03-16
Attending: PHYSICIAN ASSISTANT
Payer: COMMERCIAL

## 2022-03-16 ENCOUNTER — APPOINTMENT (OUTPATIENT)
Dept: GENERAL RADIOLOGY | Facility: CLINIC | Age: 72
End: 2022-03-16
Attending: PHYSICIAN ASSISTANT
Payer: COMMERCIAL

## 2022-03-16 ENCOUNTER — NURSE TRIAGE (OUTPATIENT)
Dept: INTERNAL MEDICINE | Facility: CLINIC | Age: 72
End: 2022-03-16

## 2022-03-16 ENCOUNTER — TELEPHONE (OUTPATIENT)
Dept: INTERNAL MEDICINE | Facility: CLINIC | Age: 72
End: 2022-03-16
Payer: COMMERCIAL

## 2022-03-16 VITALS
BODY MASS INDEX: 29.97 KG/M2 | OXYGEN SATURATION: 100 % | RESPIRATION RATE: 18 BRPM | SYSTOLIC BLOOD PRESSURE: 147 MMHG | HEART RATE: 68 BPM | WEIGHT: 214.9 LBS | TEMPERATURE: 98.1 F | DIASTOLIC BLOOD PRESSURE: 81 MMHG

## 2022-03-16 DIAGNOSIS — R60.0 LOWER LEG EDEMA: ICD-10-CM

## 2022-03-16 DIAGNOSIS — M17.11 ARTHRITIS OF RIGHT KNEE: ICD-10-CM

## 2022-03-16 PROCEDURE — 99284 EMERGENCY DEPT VISIT MOD MDM: CPT | Mod: 25 | Performed by: PHYSICIAN ASSISTANT

## 2022-03-16 PROCEDURE — 73562 X-RAY EXAM OF KNEE 3: CPT | Mod: RT

## 2022-03-16 PROCEDURE — 99282 EMERGENCY DEPT VISIT SF MDM: CPT | Performed by: PHYSICIAN ASSISTANT

## 2022-03-16 PROCEDURE — 93971 EXTREMITY STUDY: CPT | Mod: RT

## 2022-03-16 RX ORDER — PROPRANOLOL HYDROCHLORIDE 80 MG/1
80 TABLET ORAL 2 TIMES DAILY
COMMUNITY
Start: 2022-01-19 | End: 2024-08-02

## 2022-03-16 RX ORDER — BACLOFEN 10 MG/1
20 TABLET ORAL 3 TIMES DAILY
COMMUNITY
Start: 2022-02-20

## 2022-03-16 RX ORDER — ASPIRIN 81 MG/1
162 TABLET ORAL DAILY
COMMUNITY

## 2022-03-16 NOTE — TELEPHONE ENCOUNTER
Reason for Call:  Other appointment    Detailed comments: right leg, knee painful and swollen (1 week)    Phone Number Patient can be reached at: Cell number on file:    Telephone Information:   Mobile [phone]       Best Time: anytime    Can we leave a detailed message on this number? YES    Call taken on 3/16/2022 at 8:12 AM by Angela Alexander

## 2022-03-16 NOTE — DISCHARGE INSTRUCTIONS
Your ultrasound showed no signs of a blood clot.  The x-ray shows that you have some arthritis in your knee.  Arthritis can cause swelling and pain.    Please continue with ibuprofen or Tylenol for pain control.  Try wearing a knee sleeve to help support the knee and reduce swelling.      Wearing compression socks can help the swelling in your legs.  I suspect you have some dependent edema causing the swelling in your legs.  Being on a water pill helps this.  Talk with your clinic provider if this is not improving, may need to adjust medication.    Please contact your clinic provider and schedule follow-up in 1 to 2 weeks if symptoms are not improving.    If you have any worsening symptoms please return to the emergency department.    Thank you for choosing [facility]'s Emergency Department. It was a pleasure taking care of you today. If you have any questions, please call [phone].    Gilda Fay PA-C

## 2022-03-16 NOTE — ED PROVIDER NOTES
History     Chief Complaint   Patient presents with     Joint Swelling     HPI  Dylon Barragan is a 72 year old male who presents to the emergency department complaining of right leg swelling.  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  Initially after the stroke he went through physical therapy and had been doing good since then.  Recently over the last few months he had some balance issues again so after seeing his neurologist at the beginning of February he was given some exercises to do to help with his balance.  He has been doing these exercises for over a month now.  In the last week he has developed increased pain in the right knee and swelling throughout the right leg.  He denies any direct trauma or injury to the leg.  He has not had any fevers or redness in the leg.  Pain is now mostly in the medial thigh but he has swelling down to his ankle.  He denies any chest pain or shortness of breath.  He is worried about a blood clot.  He actually was at the urgent care earlier today and was referred here.  He has been taking ibuprofen for the pain without relief.  He does take a baby aspirin daily but no other blood thinners.  No recent travel.      Allergies:  No Known Allergies    Problem List:    Patient Active Problem List    Diagnosis Date Noted     Other sequelae following unspecified cerebrovascular disease 08/04/2021     Priority: Medium     Essential hypertension with goal blood pressure less than 130/80 08/30/2016     Priority: Medium     Adjustment disorder with depressed mood 12/22/2015     Priority: Medium     Acute bronchitis 07/09/2015     Priority: Medium     TED (obstructive sleep apnea) 06/25/2013     Priority: Medium     Advanced directives, counseling/discussion 01/23/2012     Priority: Medium     Advance Directive Problem List Overview:   Name Relationship Phone    Primary Health Care Agent            Alternative Health Care Agent           Discussed advance care planning with patient; however, patient declined at this time. 1/23/2012          HYPERLIPIDEMIA LDL GOAL <130 10/31/2010     Priority: Medium     GERD (gastroesophageal reflux disease) 09/25/2008     Priority: Medium     Acute, but ill-defined, cerebrovascular disease      Priority: Medium     right brainstem          Past Medical History:    Past Medical History:   Diagnosis Date     Acute, but ill-defined, cerebrovascular disease 7/99     Complication of anesthesia      Depressive disorder      Dysphagia      Edema      Essential hypertension, benign      Mild intermittent asthma      Mixed hyperlipidemia      Other psoriasis      Personal history of unspecified circulatory disease      Sleep apnea      Unspecified cerebral artery occlusion with cerebral infarction      Ventral hernia, unspecified, without mention of obstruction or gangrene        Past Surgical History:    Past Surgical History:   Procedure Laterality Date     ABDOMEN SURGERY  2000    Hernia     COLONOSCOPY  05/22/2006     COLONOSCOPY N/A 12/2/2016    Procedure: COLONOSCOPY;  Surgeon: Mj Mcfarland MD;  Location:  GI     HERNIA REPAIR  2002     REPAIR PTOSIS  10/18/2013    Procedure: REPAIR PTOSIS;  LEFT UPPER LID PTOSIS REPAIR;  Surgeon: Bienvenido Alejandra MD;  Location:  [facility]     REPAIR PTOSIS BILATERAL  8/23/2013    Procedure: REPAIR PTOSIS BILATERAL;  BILATERAL UPPER LID PTOSIS AND MECHANICAL PTOSIS REPAIR ;  Surgeon: Bienvenido Alejandra MD;  Location:  [facility]     TONSILLECTOMY & ADENOIDECTOMY       ZZC APPENDECTOMY       Z NONSPECIFIC PROCEDURE      previous PEG from CVA     Z NONSPECIFIC PROCEDURE      incisional hernia repair from PEG site       Family History:    Family History   Problem Relation Age of Onset     C.A.D. Father      Diabetes Father      Hypertension Father      Coronary Artery Disease Father      Hyperlipidemia Father      Substance Abuse Father         Alcohol     C.A.D. Paternal  Grandfather      Coronary Artery Disease Paternal Grandfather      C.A.D. Paternal Uncle      Cancer Mother         skin     Hypertension Mother      Cerebrovascular Disease Mother      Other Cancer Mother      Thyroid Disease Mother      Heart Disease Sister         valve replacement     Osteoporosis Sister      Genetic Disorder Sister         Heart     Thyroid Disease Sister      Coronary Artery Disease Maternal Grandmother      Coronary Artery Disease Paternal Grandmother         Congestive heart failure     Other Cancer Maternal Grandfather      Depression Sister      Anxiety Disorder Sister      Anesthesia Reaction Sister      Anesthesia Reaction Sister      Thyroid Disease Sister        Social History:  Marital Status:   [2]  Social History     Tobacco Use     Smoking status: Former Smoker     Packs/day: 1.00     Years: 20.00     Pack years: 20.00     Types: Cigarettes     Quit date: 1985     Years since quittin.9     Smokeless tobacco: Never Used     Tobacco comment: spouse smokes outside   Vaping Use     Vaping Use: Never used   Substance Use Topics     Alcohol use: Yes     Alcohol/week: 0.0 standard drinks     Comment: Occasional     Drug use: Never        Medications:    albuterol (VENTOLIN HFA) 108 (90 Base) MCG/ACT inhaler  aspirin 81 MG EC tablet  atorvastatin (LIPITOR) 40 MG tablet  baclofen (LIORESAL) 10 MG tablet  clonazePAM (KLONOPIN) 0.5 MG tablet  indapamide (LOZOL) 1.25 MG tablet  order for DME  PARoxetine (PAXIL-CR) 12.5 MG 24 hr tablet  propranolol (INDERAL) 80 MG tablet  azithromycin (ZITHROMAX) 250 MG tablet  ketoconazole (NIZORAL) 2 % cream  nystatin (MYCOSTATIN) 716080 UNIT/GM external cream          Review of Systems   All other systems reviewed and are negative.      Physical Exam   BP: (!) 147/81  Pulse: 68  Temp: 98.1  F (36.7  C)  Resp: 18  Weight: 97.5 kg (214 lb 14.4 oz)  SpO2: 100 %      Physical Exam  Vitals and nursing note reviewed.   Constitutional:        General: He is not in acute distress.     Appearance: Normal appearance. He is not ill-appearing, toxic-appearing or diaphoretic.   HENT:      Head: Normocephalic and atraumatic.   Eyes:      Conjunctiva/sclera: Conjunctivae normal.   Cardiovascular:      Pulses: Normal pulses.   Pulmonary:      Effort: Pulmonary effort is normal. No respiratory distress.   Musculoskeletal:      Cervical back: Neck supple.      Comments: Bilateral legs with trace pitting edema, right greater than left.  He has strong DP pulses bilaterally.  Right knee with swelling and tenderness on the medial aspect.  Left knee with no swelling or tenderness demonstrated.  Right thigh has tenderness along the distal third medial soft tissue.  No erythema or warmth throughout the right leg present.   Skin:     General: Skin is warm and dry.   Neurological:      Mental Status: He is alert and oriented to person, place, and time. Mental status is at baseline.      Comments: Patient reports chronic diminished sensation in right side post CVA.   Psychiatric:         Mood and Affect: Mood normal.         Behavior: Behavior normal.         ED Course          Procedures              Critical Care time:  none        Results for orders placed or performed during the hospital encounter of 03/16/22 (from the past 24 hour(s))   XR Knee Right 3 Views    Narrative    RIGHT KNEE THREE VIEWS   3/16/2022 2:02 PM     HISTORY:  Pain and swelling in knee.    COMPARISON: None.      Impression    IMPRESSION: Mild medial compartment degenerative change with marginal  osteophyte formation. Very mild marginal osteophyte formation in the  patellofemoral compartment. No acute fracture. Probable small joint  effusion.   US Lower Extremity Venous Duplex Right    Narrative    US LOWER EXTREMITY VENOUS DUPLEX RIGHT 3/16/2022 2:18 PM    CLINICAL HISTORY/INDICATION: leg swelling, pain    COMPARISON: 8/30/2016    TECHNIQUE:   Grayscale, color-flow, and spectral waveform analysis  were performed  of the deep veins of the right lower extremity    FINDINGS:   The right common femoral vein, femoral vein and popliteal vein  demonstrate normal compressibility, spectral waveform, color flow and  augmentation.    The right posterior tibial vein, peroneal vein, and greater saphenous  vein are compressible.    The contralateral left common femoral vein demonstrates normal  compressibility, spectral waveform, color flow and augmentation.      Impression    IMPRESSION:   No evidence of deep venous thrombosis in the right lower extremity    MILLIE RAIN DO         SYSTEM ID:  [id number]       Medications - No data to display       Assessments & Plan (with Medical Decision Making)  Dylon Barragan is a 72 year old male who presented to the ED for concerns of right knee pain and leg swelling.  This has been ongoing for over a week.  No known injury but he has been doing physical therapy exercises.  On arrival to the ED he was hypertensive but otherwise had normal vital signs.  Exam today did demonstrate some swelling in the right knee with medial tenderness.  He had bilateral lower leg edema but right was worse than left.  We will get an x-ray of the knee and an ultrasound of the leg to evaluate things further.  Patient was offered something for pain here but he declined.  X-ray today showed some osteophyte formation with some medial compartment degenerative changes and small joint effusion.  Ultrasound was fortunately negative for acute DVT.  I discussed these results with the patient.  I suspect the knee pain is secondary to some arthritic changes.  Swelling could be related to dependent edema.  He is on a thiazide diuretic already.  I suggested he try some compression stockings and elevate the legs when possible.  For the knee pain he was encouraged to try a knee sleeve for swelling and support, ibuprofen and Tylenol regularly for pain, and ice.  If these things are not helping in the next 1 to 2  weeks he was advised to follow-up with his clinic provider.  Return precautions were also provided.  All questions answered and patient discharged home in suitable condition.     I have reviewed the nursing notes.    I have reviewed the findings, diagnosis, plan and need for follow up with the patient.       Discharge Medication List as of 3/16/2022  2:37 PM          Final diagnoses:   Arthritis of right knee   Lower leg edema     Note: Chart documentation done in part with Dragon Voice Recognition software. Although reviewed after completion, some word and grammatical errors may remain.     3/16/2022   [facility] EMERGENCY DEPT     Gilda Fay PA-C  03/16/22 [id number]

## 2022-03-16 NOTE — TELEPHONE ENCOUNTER
Called pt and offered appt. He ended up going in to ED this morning and everything was found to be ok. He will wait for his 4/1 appt with pcp.

## 2022-03-16 NOTE — TELEPHONE ENCOUNTER
I did not create this problem.    Please schedule the patient with what ever provider exists at what ever time schedule is available.    Chantal

## 2022-03-16 NOTE — ED TRIAGE NOTES
Pt reports he is having pain and swelling of the right knee and leg, he has started doing exercises and is wondering if that is why but reports he had a stroke and that side is affected so he wants to make sure everything is ok

## 2022-03-23 NOTE — TELEPHONE ENCOUNTER
Patient went into emergency room. Closing encounter      JOSELITO Oliva/[facility]  March 23, 2022

## 2022-04-01 ENCOUNTER — OFFICE VISIT (OUTPATIENT)
Dept: INTERNAL MEDICINE | Facility: CLINIC | Age: 72
End: 2022-04-01
Payer: COMMERCIAL

## 2022-04-01 VITALS
SYSTOLIC BLOOD PRESSURE: 128 MMHG | BODY MASS INDEX: 28.98 KG/M2 | TEMPERATURE: 97.9 F | RESPIRATION RATE: 16 BRPM | OXYGEN SATURATION: 96 % | HEIGHT: 71 IN | WEIGHT: 207 LBS | DIASTOLIC BLOOD PRESSURE: 76 MMHG | HEART RATE: 84 BPM

## 2022-04-01 DIAGNOSIS — R19.7 DIARRHEA, UNSPECIFIED TYPE: ICD-10-CM

## 2022-04-01 DIAGNOSIS — M25.561 ACUTE PAIN OF RIGHT KNEE: ICD-10-CM

## 2022-04-01 DIAGNOSIS — M79.651 PAIN OF RIGHT THIGH: ICD-10-CM

## 2022-04-01 DIAGNOSIS — R53.83 FATIGUE, UNSPECIFIED TYPE: Primary | ICD-10-CM

## 2022-04-01 LAB
ALBUMIN SERPL-MCNC: 3.7 G/DL (ref 3.4–5)
ALP SERPL-CCNC: 88 U/L (ref 40–150)
ALT SERPL W P-5'-P-CCNC: 31 U/L (ref 0–70)
ANION GAP SERPL CALCULATED.3IONS-SCNC: 4 MMOL/L (ref 3–14)
AST SERPL W P-5'-P-CCNC: 25 U/L (ref 0–45)
BILIRUB SERPL-MCNC: 0.9 MG/DL (ref 0.2–1.3)
BUN SERPL-MCNC: 21 MG/DL (ref 7–30)
CALCIUM SERPL-MCNC: 9.1 MG/DL (ref 8.5–10.1)
CHLORIDE BLD-SCNC: 102 MMOL/L (ref 94–109)
CO2 SERPL-SCNC: 33 MMOL/L (ref 20–32)
CREAT SERPL-MCNC: 0.95 MG/DL (ref 0.66–1.25)
ERYTHROCYTE [DISTWIDTH] IN BLOOD BY AUTOMATED COUNT: 14.4 % (ref 10–15)
GFR SERPL CREATININE-BSD FRML MDRD: 85 ML/MIN/1.73M2
GLUCOSE BLD-MCNC: 126 MG/DL (ref 70–99)
HCT VFR BLD AUTO: 42.8 % (ref 40–53)
HGB BLD-MCNC: 14.5 G/DL (ref 13.3–17.7)
MCH RBC QN AUTO: 30.3 PG (ref 26.5–33)
MCHC RBC AUTO-ENTMCNC: 33.9 G/DL (ref 31.5–36.5)
MCV RBC AUTO: 89 FL (ref 78–100)
PLATELET # BLD AUTO: 143 10E3/UL (ref 150–450)
POTASSIUM BLD-SCNC: 3.8 MMOL/L (ref 3.4–5.3)
PROT SERPL-MCNC: 7.4 G/DL (ref 6.8–8.8)
RBC # BLD AUTO: 4.79 10E6/UL (ref 4.4–5.9)
SODIUM SERPL-SCNC: 139 MMOL/L (ref 133–144)
TSH SERPL DL<=0.005 MIU/L-ACNC: 3.4 MU/L (ref 0.4–4)
WBC # BLD AUTO: 7.8 10E3/UL (ref 4–11)

## 2022-04-01 PROCEDURE — 99214 OFFICE O/P EST MOD 30 MIN: CPT | Performed by: INTERNAL MEDICINE

## 2022-04-01 PROCEDURE — 84443 ASSAY THYROID STIM HORMONE: CPT | Performed by: INTERNAL MEDICINE

## 2022-04-01 PROCEDURE — 85027 COMPLETE CBC AUTOMATED: CPT | Performed by: INTERNAL MEDICINE

## 2022-04-01 PROCEDURE — 36415 COLL VENOUS BLD VENIPUNCTURE: CPT | Performed by: INTERNAL MEDICINE

## 2022-04-01 PROCEDURE — 80053 COMPREHEN METABOLIC PANEL: CPT | Performed by: INTERNAL MEDICINE

## 2022-04-01 ASSESSMENT — PAIN SCALES - GENERAL: PAINLEVEL: SEVERE PAIN (7)

## 2022-04-01 ASSESSMENT — ASTHMA QUESTIONNAIRES
QUESTION_3 LAST FOUR WEEKS HOW OFTEN DID YOUR ASTHMA SYMPTOMS (WHEEZING, COUGHING, SHORTNESS OF BREATH, CHEST TIGHTNESS OR PAIN) WAKE YOU UP AT NIGHT OR EARLIER THAN USUAL IN THE MORNING: NOT AT ALL
ACT_TOTALSCORE: 25
ACUTE_EXACERBATION_TODAY: NO
QUESTION_2 LAST FOUR WEEKS HOW OFTEN HAVE YOU HAD SHORTNESS OF BREATH: NOT AT ALL
QUESTION_1 LAST FOUR WEEKS HOW MUCH OF THE TIME DID YOUR ASTHMA KEEP YOU FROM GETTING AS MUCH DONE AT WORK, SCHOOL OR AT HOME: NONE OF THE TIME
QUESTION_4 LAST FOUR WEEKS HOW OFTEN HAVE YOU USED YOUR RESCUE INHALER OR NEBULIZER MEDICATION (SUCH AS ALBUTEROL): NOT AT ALL
ACT_TOTALSCORE: 25
QUESTION_5 LAST FOUR WEEKS HOW WOULD YOU RATE YOUR ASTHMA CONTROL: COMPLETELY CONTROLLED

## 2022-04-01 NOTE — PROGRESS NOTES
"  Assessment & Plan     Fatigue, unspecified type  Patient comes in with some fatigue, he is wearing his CPAP, says he has no energy.  We will check a CBC comprehensive panel and TSH.  He may need to follow-up in the future if this continues  - TSH with free T4 reflex; Future  - CBC with platelets; Future  - Comprehensive metabolic panel (BMP + Alb, Alk Phos, ALT, AST, Total. Bili, TP); Future  - TSH with free T4 reflex  - CBC with platelets  - Comprehensive metabolic panel (BMP + Alb, Alk Phos, ALT, AST, Total. Bili, TP)    Pain of right thigh  Pain in the right thigh and leg, he was in the emergency room had an x-ray on ultrasound he does have swelling in the muscles are tender.  I do not think he has a clot as he was negative on his ultrasound previously.  He was doing some deep knee bends which I think overused his muscles and cause tenderness throughout.  Recommended he use ibuprofen as an anti-inflammatory.  400 mg 3 times a day with food for a week.    Acute pain of right knee  Acute right knee pain, x-ray shows no significant arthritis, no fractures.  Patient has good range of motion.  We will try to contact inflammatory therapy for him, continue to ice the knee especially after years.    Diarrhea, unspecified type  Random having diarrhea and vomiting.  Discussed avoiding fatty foods as he did have it after fish vizcaino.  He can add fiber and probiotics or yogurt to his diet to help.               BMI:   Estimated body mass index is 28.87 kg/m  as calculated from the following:    Height as of this encounter: 1.803 m (5' 11\").    Weight as of this encounter: 93.9 kg (207 lb).           No follow-ups on file.    Orville Galeas MD  [facility] is [facility] 72 year [facility] who presents for the following health issues  accompanied by his spouse.    History of Present Illness       Reason for visit:  Knee pain and swelling. Recurring vomiting and diarrhea  Symptom onset:  More than a " "month  Symptoms include:  Pain in right knee and lower leg. Have had two episodes of severe vomiting and diarrhea in the last month.  Symptom intensity:  Moderate  Symptom progression:  Staying the same  Had these symptoms before:  No  What makes it worse:  Movement  What makes it better:  Elevating leg. Applying heat\ice.    He eats 0-1 servings of fruits and vegetables daily.He consumes 0 sweetened beverage(s) daily.He exercises with enough effort to increase his heart rate 9 or less minutes per day.  He exercises with enough effort to increase his heart rate 3 or less days per week.   He is taking medications regularly.     He was in the ER a few weeks ago.    Neurologist wanted exercises to help balance, included deep knee bends and right knee got worse.  Knee pain with walking and turning.  Upper leg is swollen    ER had negative ultrasound for clots. Xray showed mild disease.    Left leg is ok     Has had a bout of diarrhea and vomiting as well.  Two weeks later happened again..      Some fatigue as well.         Review of Systems         Objective    /76   Pulse 84   Temp 97.9  F (36.6  C) (Temporal)   Resp 16   Ht 1.803 m (5' 11\")   Wt 93.9 kg (207 lb)   SpO2 96%   BMI 28.87 kg/m    Body mass index is 28.87 kg/m .  Physical Exam   NAD  Heart regular   Lungs clear  Right upper leg with swelling, tender  Knee with mild swelling patella is sore to touch,       Xray and ultrasound reviewed          "

## 2022-04-27 ENCOUNTER — TRANSFERRED RECORDS (OUTPATIENT)
Dept: INTERNAL MEDICINE | Facility: CLINIC | Age: 72
End: 2022-04-27

## 2022-04-27 ENCOUNTER — OFFICE VISIT (OUTPATIENT)
Dept: INTERNAL MEDICINE | Facility: CLINIC | Age: 72
End: 2022-04-27
Payer: COMMERCIAL

## 2022-04-27 VITALS
RESPIRATION RATE: 16 BRPM | HEART RATE: 82 BPM | DIASTOLIC BLOOD PRESSURE: 76 MMHG | SYSTOLIC BLOOD PRESSURE: 134 MMHG | WEIGHT: 208 LBS | OXYGEN SATURATION: 94 % | TEMPERATURE: 97.7 F | BODY MASS INDEX: 29.01 KG/M2

## 2022-04-27 DIAGNOSIS — M79.651 PAIN OF RIGHT THIGH: Primary | ICD-10-CM

## 2022-04-27 DIAGNOSIS — F41.9 ANXIETY: ICD-10-CM

## 2022-04-27 DIAGNOSIS — M25.561 ACUTE PAIN OF RIGHT KNEE: ICD-10-CM

## 2022-04-27 PROCEDURE — 99214 OFFICE O/P EST MOD 30 MIN: CPT | Performed by: INTERNAL MEDICINE

## 2022-04-27 RX ORDER — PAROXETINE HYDROCHLORIDE HEMIHYDRATE 25 MG/1
25 TABLET, FILM COATED, EXTENDED RELEASE ORAL EVERY MORNING
Qty: 90 TABLET | Refills: 3 | Status: SHIPPED | OUTPATIENT
Start: 2022-04-27 | End: 2023-05-02

## 2022-04-27 ASSESSMENT — PATIENT HEALTH QUESTIONNAIRE - PHQ9
10. IF YOU CHECKED OFF ANY PROBLEMS, HOW DIFFICULT HAVE THESE PROBLEMS MADE IT FOR YOU TO DO YOUR WORK, TAKE CARE OF THINGS AT HOME, OR GET ALONG WITH OTHER PEOPLE: SOMEWHAT DIFFICULT
SUM OF ALL RESPONSES TO PHQ QUESTIONS 1-9: 7
SUM OF ALL RESPONSES TO PHQ QUESTIONS 1-9: 7

## 2022-04-27 ASSESSMENT — ANXIETY QUESTIONNAIRES
GAD7 TOTAL SCORE: 3
GAD7 TOTAL SCORE: 3
2. NOT BEING ABLE TO STOP OR CONTROL WORRYING: NOT AT ALL
GAD7 TOTAL SCORE: 3
5. BEING SO RESTLESS THAT IT IS HARD TO SIT STILL: NOT AT ALL
6. BECOMING EASILY ANNOYED OR IRRITABLE: SEVERAL DAYS
4. TROUBLE RELAXING: NOT AT ALL
3. WORRYING TOO MUCH ABOUT DIFFERENT THINGS: SEVERAL DAYS
1. FEELING NERVOUS, ANXIOUS, OR ON EDGE: SEVERAL DAYS
7. FEELING AFRAID AS IF SOMETHING AWFUL MIGHT HAPPEN: NOT AT ALL
7. FEELING AFRAID AS IF SOMETHING AWFUL MIGHT HAPPEN: NOT AT ALL

## 2022-04-27 ASSESSMENT — ENCOUNTER SYMPTOMS: NERVOUS/ANXIOUS: 1

## 2022-04-27 NOTE — PROGRESS NOTES
"paxia  Assessment & Plan     Pain of right thigh  Pain in the thigh probably from the knee some swelling this is also from the knee.  Should be further worked up did not improve with NSAIDs    Acute pain of right knee  Knee pain we will get an MRI, this did not improve with NSAIDs.  Exam is indicating could be a meniscus tear.  X-ray was fine without signs of arthritis  - MR Knee Right w/o Contrast; Future    Anxiety  Anxiety is chronically been on Paxil at 12.5 but having issues would like to increase to 25.  I think this will help him has some other stresses but is getting through them.  - PARoxetine (PAXIL-CR) 25 MG 24 hr tablet; Take 1 tablet (25 mg) by mouth every morning             BMI:   Estimated body mass index is 29.01 kg/m  as calculated from the following:    Height as of 4/1/22: 1.803 m (5' 11\").    Weight as of this encounter: 94.3 kg (208 lb).           No follow-ups on file.    Orville Galeas MD  [facility] SUNNI Scott is a 72 year old who presents for the following health issues     Anxiety    History of Present Illness       Mental Health Follow-up:  Patient presents to follow-up on Anxiety.    Patient's anxiety since last visit has been:  Better  The patient is having other symptoms associated with anxiety.  Any significant life events: health concerns and other  Patient is not feeling anxious or having panic attacks.  Patient has no concerns about alcohol or drug use.       Today's PHQ-9         PHQ-9 Total Score: 7  PHQ-9 Q9 Thoughts of better off dead/self-harm past 2 weeks :   (P) Not at all    How difficult have these problems made it for you to do your work, take care of things at home, or get along with other people: Somewhat difficult    Today's CHELSEA-7 Score: 3    Reason for visit:  Knee pain  Symptom onset:  More than a month  Symptoms include:  Knee pain  and leg swelling  Symptom intensity:  Moderate  Symptom progression:  Staying the same  Had these " symptoms before:  No  What makes it worse:  No  What makes it better:  Less knee pain    He eats 2-3 servings of fruits and vegetables daily.He consumes 0 sweetened beverage(s) daily.He exercises with enough effort to increase his heart rate 9 or less minutes per day.  He exercises with enough effort to increase his heart rate 3 or less days per week.   He is taking medications regularly.     Right knee continue bother him, on the top and the sides goes up into the thigh. Hard to walk or bend down. Twisting will snap in the knee. Xray was mild disease. Feel best to elevate in the recliner, did two weeks of nsaids and no change. Some swelling in the leg.     Was doing balance exercises when this started, knee squats.             Review of Systems   Psychiatric/Behavioral: The patient is nervous/anxious.             Objective    BP (!) 144/76   Pulse 82   Temp 97.7  F (36.5  C) (Temporal)   Resp 16   Wt 94.3 kg (208 lb)   SpO2 94%   BMI 29.01 kg/m    Body mass index is 29.01 kg/m .  Physical Exam   Right knee swelling, some swelling in the thigh, positive Isabelle's sign ligaments are stable

## 2022-04-28 ASSESSMENT — PATIENT HEALTH QUESTIONNAIRE - PHQ9: SUM OF ALL RESPONSES TO PHQ QUESTIONS 1-9: 7

## 2022-04-28 ASSESSMENT — ANXIETY QUESTIONNAIRES: GAD7 TOTAL SCORE: 3

## 2022-04-29 ENCOUNTER — HOSPITAL ENCOUNTER (OUTPATIENT)
Dept: MRI IMAGING | Facility: CLINIC | Age: 72
Discharge: HOME | End: 2022-04-29
Attending: INTERNAL MEDICINE | Admitting: INTERNAL MEDICINE
Payer: COMMERCIAL

## 2022-04-29 DIAGNOSIS — M25.561 ACUTE PAIN OF RIGHT KNEE: ICD-10-CM

## 2022-04-29 PROCEDURE — 73721 MRI JNT OF LWR EXTRE W/O DYE: CPT | Mod: 26 | Performed by: RADIOLOGY

## 2022-04-29 PROCEDURE — 73721 MRI JNT OF LWR EXTRE W/O DYE: CPT | Mod: RT

## 2022-05-03 ENCOUNTER — TELEPHONE (OUTPATIENT)
Dept: INTERNAL MEDICINE | Facility: CLINIC | Age: 72
End: 2022-05-03
Payer: COMMERCIAL

## 2022-05-03 DIAGNOSIS — M25.561 ACUTE PAIN OF RIGHT KNEE: Primary | ICD-10-CM

## 2022-05-03 NOTE — TELEPHONE ENCOUNTER
Attempted to reach patient, unable to leave message. Please relay results below and help set up an appointment.   Franchesca Viera MA

## 2022-05-03 NOTE — TELEPHONE ENCOUNTER
Patient called back and was given results. He is wanting to check to see who is in his network with [facility] but he will be calling the specialty clinic to set up his appointment with orthopedics.

## 2022-05-03 NOTE — TELEPHONE ENCOUNTER
Pt notified of the message below. I also gave him the number to schedule if he doesn't hear from them. Ani Morgan, CMA

## 2022-05-03 NOTE — TELEPHONE ENCOUNTER
Patient completed MRI, results showed a 'frayed meniscus' and suggest a follow up with orthopedics.  Patient would like to discuss this with you and possibly have you send a referral.  He would like to know what you are suggesting for 'next steps'.    Referral for orthopedics pended if needed.    Tere Nogueira XRO/

## 2022-05-03 NOTE — TELEPHONE ENCOUNTER
Next step is to see orthopedics and discuss if a surgery to clean up the meniscus is beneficial to his symptoms.  Really needs to discuss that with the surgeon.  Referral is done.

## 2022-05-03 NOTE — TELEPHONE ENCOUNTER
----- Message from Orville Galeas MD sent at 4/29/2022  5:04 PM CDT -----  Your MRI does show some fraying of the lateral meniscus.  Some other chronic changes in the knee I would have you see orthopedics to discuss possible treatment.  Please get him an orthopedic consult ordered and set up.

## 2022-05-16 ASSESSMENT — KOOS JR
STANDING UPRIGHT: MILD
HOW SEVERE IS YOUR KNEE STIFFNESS AFTER FIRST WAKING IN MORNING: MODERATE
KOOS JR SCORING: 57.14
GOING UP OR DOWN STAIRS: MODERATE
RISING FROM SITTING: MODERATE
TWISING OR PIVOTING ON KNEE: MODERATE
STRAIGHTENING KNEE FULLY: MILD
BENDING TO THE FLOOR TO PICK UP OBJECT: MODERATE

## 2022-05-19 ENCOUNTER — OFFICE VISIT (OUTPATIENT)
Dept: ORTHOPEDICS | Facility: OTHER | Age: 72
End: 2022-05-19
Payer: COMMERCIAL

## 2022-05-19 VITALS
HEIGHT: 71 IN | WEIGHT: 206 LBS | DIASTOLIC BLOOD PRESSURE: 77 MMHG | BODY MASS INDEX: 28.84 KG/M2 | RESPIRATION RATE: 18 BRPM | SYSTOLIC BLOOD PRESSURE: 131 MMHG | HEART RATE: 80 BPM

## 2022-05-19 DIAGNOSIS — M25.561 ACUTE PAIN OF RIGHT KNEE: ICD-10-CM

## 2022-05-19 DIAGNOSIS — M17.11 PRIMARY OSTEOARTHRITIS OF RIGHT KNEE: ICD-10-CM

## 2022-05-19 PROCEDURE — 99203 OFFICE O/P NEW LOW 30 MIN: CPT | Mod: 25 | Performed by: ORTHOPAEDIC SURGERY

## 2022-05-19 PROCEDURE — 20610 DRAIN/INJ JOINT/BURSA W/O US: CPT | Mod: RT | Performed by: ORTHOPAEDIC SURGERY

## 2022-05-19 RX ORDER — DICLOFENAC SODIUM 75 MG/1
75 TABLET, DELAYED RELEASE ORAL 2 TIMES DAILY
Qty: 60 TABLET | Refills: 11 | Status: SHIPPED | OUTPATIENT
Start: 2022-05-19 | End: 2022-09-20

## 2022-05-19 RX ORDER — METHYLPREDNISOLONE ACETATE 80 MG/ML
80 INJECTION, SUSPENSION INTRA-ARTICULAR; INTRALESIONAL; INTRAMUSCULAR; SOFT TISSUE
Status: SHIPPED | OUTPATIENT
Start: 2022-05-19

## 2022-05-19 RX ORDER — LIDOCAINE HYDROCHLORIDE 10 MG/ML
5 INJECTION, SOLUTION INFILTRATION; PERINEURAL
Status: SHIPPED | OUTPATIENT
Start: 2022-05-19

## 2022-05-19 RX ADMIN — LIDOCAINE HYDROCHLORIDE 5 ML: 10 INJECTION, SOLUTION INFILTRATION; PERINEURAL at 11:52

## 2022-05-19 RX ADMIN — METHYLPREDNISOLONE ACETATE 80 MG: 80 INJECTION, SUSPENSION INTRA-ARTICULAR; INTRALESIONAL; INTRAMUSCULAR; SOFT TISSUE at 11:52

## 2022-05-19 NOTE — PROGRESS NOTES
Dylon Barragan is a 72 year old male who is seen in consultation at the request of Dr. Orville Galeas  for right knee pain.   He has had this come on the last few months.  He thinks it was aggravated with squats he was doing for therapy with knee strengthening.  He has a history of a stroke 20 years ago and therapy has given him exercises for balance and strengthening.  He also has some confusion and his wife reminded him of a fall he had about 2 months ago.  He has also been down on his knees helping move furniture and this aggravated his knee.  He reports dull constant pain rated 7 out of 10.  It is worse with stairs, twisting, pivoting.  It is better if he elevates the leg.  He has used ibuprofen up to 1200 mg/day for 2 weeks previously he is not currently taking it.  He has been using CBD oil.    He has had MRI scan showing significant chondromalacia of the patellofemoral joint, medial joint, lateral joint.  There was some free edge fraying of the posterior horn medial meniscus but no roger tears.  Certainly no flaps.  Ligaments were all intact.    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     Complication of anesthesia     half an airway due to stroke hx     Depressive disorder     Given meds for but think I'm ok     Dysphagia      Edema      Essential hypertension, benign      Mild intermittent asthma      Mixed hyperlipidemia      Other psoriasis      Personal history of unspecified circulatory disease      Primary osteoarthritis of right knee 5/19/2022     Sleep apnea     CPAP     Unspecified cerebral artery occlusion with cerebral infarction     1999     Ventral hernia, unspecified, without mention of obstruction or gangrene        Past Surgical History:   Procedure Laterality Date     ABDOMEN SURGERY  2000    Hernia     COLONOSCOPY  05/22/2006     COLONOSCOPY N/A 12/2/2016    Procedure:  COLONOSCOPY;  Surgeon: Mj Mcfarland MD;  Location:  GI     HERNIA REPAIR  2002     REPAIR PTOSIS  10/18/2013    Procedure: REPAIR PTOSIS;  LEFT UPPER LID PTOSIS REPAIR;  Surgeon: Bienvenido Alejandra MD;  Location:  [facility]     REPAIR PTOSIS BILATERAL  8/23/2013    Procedure: REPAIR PTOSIS BILATERAL;  BILATERAL UPPER LID PTOSIS AND MECHANICAL PTOSIS REPAIR ;  Surgeon: Bienvenido Alejandra MD;  Location:  [facility]     TONSILLECTOMY & ADENOIDECTOMY       ZZC APPENDECTOMY       ZZ NONSPECIFIC PROCEDURE      previous PEG from CVA     ZZC NONSPECIFIC PROCEDURE      incisional hernia repair from PEG site       Family History   Problem Relation Age of Onset     C.A.D. Father      Diabetes Father      Hypertension Father      Coronary Artery Disease Father      Hyperlipidemia Father      Substance Abuse Father         Alcohol     C.A.D. Paternal Grandfather      Coronary Artery Disease Paternal Grandfather      C.A.D. Paternal Uncle      Cancer Mother         skin     Hypertension Mother      Cerebrovascular Disease Mother      Other Cancer Mother      Thyroid Disease Mother      Heart Disease Sister         valve replacement     Osteoporosis Sister      Genetic Disorder Sister         Heart     Thyroid Disease Sister      Coronary Artery Disease Maternal Grandmother      Coronary Artery Disease Paternal Grandmother         Congestive heart failure     Other Cancer Maternal Grandfather      Depression Sister      Anxiety Disorder Sister      Anesthesia Reaction Sister      Anesthesia Reaction Sister      Thyroid Disease Sister        Social History     Socioeconomic History     Marital status:      Spouse name: Not on file     Number of children: Not on file     Years of education: Not on file     Highest education level: Not on file   Occupational History     Not on file   Tobacco Use     Smoking status: Former Smoker     Packs/day: 1.00     Years: 20.00     Pack years: 20.00     Types: Cigarettes     Quit date:  1985     Years since quittin.1     Smokeless tobacco: Never Used     Tobacco comment: spouse smokes outside   Vaping Use     Vaping Use: Never used   Substance and Sexual Activity     Alcohol use: Yes     Alcohol/week: 0.0 standard drinks     Comment: Occasional     Drug use: Never     Sexual activity: Yes     Partners: Female     Birth control/protection: Post-menopausal     Comment: postmenopausal   Other Topics Concern     Parent/sibling w/ CABG, MI or angioplasty before 65F 55M? Yes     Comment: Father   Social History Narrative     Not on file     Social Determinants of Health     Financial Resource Strain: Not on file   Food Insecurity: Not on file   Transportation Needs: Not on file   Physical Activity: Not on file   Stress: Not on file   Social Connections: Not on file   Intimate Partner Violence: Not on file   Housing Stability: Not on file       Current Outpatient Medications   Medication Sig Dispense Refill     diclofenac (VOLTAREN) 75 MG EC tablet Take 1 tablet (75 mg) by mouth 2 times daily 60 tablet 11     albuterol (VENTOLIN HFA) 108 (90 Base) MCG/ACT inhaler INHALE 2 PUFFS BY MOUTH EVERY 6 HOURS AS NEEDED FOR SHORTNESS OF BREATH /DYSPNEA (Patient not taking: No sig reported) 18 g 2     aspirin 81 MG EC tablet Take 162 mg by mouth daily       atorvastatin (LIPITOR) 40 MG tablet TAKE 1 TABLET (40 MG) BY MOUTH DAILY 90 tablet 3     baclofen (LIORESAL) 10 MG tablet Take 20 mg by mouth 3 times daily       clonazePAM (KLONOPIN) 0.5 MG tablet Take 1 tablet (0.5 mg) by mouth 2 times daily (Patient taking differently: Take 0.5 mg by mouth 2 times daily Am and 4pm) 12 tablet 0     indapamide (LOZOL) 1.25 MG tablet TAKE 1 TABLET (1.25 MG) BY MOUTH EVERY MORNING 90 tablet 1     ketoconazole (NIZORAL) 2 % cream APPLY TO AFFECTED AREA  on ear, face and handsBID (Patient not taking: No sig reported) 60 g 2     nystatin (MYCOSTATIN) 294506 UNIT/GM external cream Apply topically 2 times daily Profile 60 g 1  "    order for DME Equipment ordered: RESMED Auto PAP Mask type: Full face  Settings: 6-15 CM H2O       PARoxetine (PAXIL-CR) 12.5 MG 24 hr tablet Take 1 tablet (12.5 mg) by mouth every morning 90 tablet 3     PARoxetine (PAXIL-CR) 25 MG 24 hr tablet Take 1 tablet (25 mg) by mouth every morning 90 tablet 3     propranolol (INDERAL) 80 MG tablet Take 80 mg by mouth 2 times daily         No Known Allergies    REVIEW OF SYSTEMS:  CONSTITUTIONAL:  NEGATIVE for fever, chills, change in weight, not feeling tired  SKIN:  NEGATIVE for worrisome rashes, no skin lumps, no skin ulcers and no non-healing wounds  EYES:  NEGATIVE for vision changes or irritation.  ENT/MOUTH:  NEGATIVE.  No hearing loss, no hoarseness, no difficulty swallowing.  RESP:  NEGATIVE. No cough or shortness of breath.  CV:  NEGATIVE for chest pain, palpitations or peripheral edema  GI:  NEGATIVE for nausea, abdominal pain, heartburn, or change in bowel habits  :  Negative. No dysuria, no hematuria  MUSCULOSKELETAL:  See HPI above  NEURO:  NEGATIVE . No headaches, no dizziness,  no numbness  ENDOCRINE:  NEGATIVE for temperature intolerance, skin/hair changes  HEME/ALLERGY/IMMUNE:  NEGATIVE for bleeding problems  PSYCHIATRIC:  NEGATIVE. no anxiety, no depression.      Exam:  Vitals: /77   Pulse 80   Resp 18   Ht 1.803 m (5' 11\")   Wt 93.4 kg (206 lb)   BMI 28.73 kg/m    BMI= Body mass index is 28.73 kg/m .  Constitutional:  healthy, alert and no distress  Neuro: Alert and Oriented x 3, Sensation grossly WNL.  HEENT:  Atraumatic, EOMI  Neck:  Neck supple with no tenderness.  Psych: Affect normal   Respiratory: Breathing not labored.  Cardiovascular: normal peripheral pulses  Lymph: no adenopathy  Skin: No rashes,worrisome lesions or skin problems  Spine: straight, no straight leg raising pain.  Hips show full range of motion.  There is no tenderness over the sacro-iliac joints, sciatic notch, or greater trochanters.   He has good range of motion " of both knees from 0 to past 120 degrees.  He does have some patellofemoral crepitation on both knees.  He does have considerable sensitivity of the patella on the right.  He also has medial joint tenderness on the right.  No lateral tenderness.  He did not have significant pain with medial or lateral Isabelle's.  He had no ligamentous laxity of MCL, LCL, cruciates.  He does have a moderate effusion on the right knee.  Sensation, motor and circulation are intact.    Assessment:  Right knee osteoarthritis.  Plan:  He  desires injection today of right knee(s).  Risks, benefits, potential complications and alternatives were discussed.   With the patient's consent, sterile prep was performed of right knee(s).  Right knee was injected with Depo Medrol 80 mg and lidocaine at anteromedial site.  Return to clinic as needed.    I also gave prescription for diclofenac 75 mg twice daily in case the injection is not adequate.

## 2022-05-19 NOTE — PROGRESS NOTES
Large Joint Injection/Arthocentesis: R knee joint    Date/Time: 5/19/2022 11:52 AM  Performed by: Zen Ramos MD  Authorized by: Zen Ramos MD     Indications:  Pain  Needle Size:  22 G  Guidance: landmark guided    Location:  Knee      Medications:  5 mL lidocaine 1 %; 80 mg methylPREDNISolone 80 MG/ML  Outcome:  Tolerated well, no immediate complications  Procedure discussed: discussed risks, benefits, and alternatives    Consent Given by:  Patient  Timeout: timeout called immediately prior to procedure    Prep: patient was prepped and draped in usual sterile fashion

## 2022-05-19 NOTE — PATIENT INSTRUCTIONS
You have had a steroid injection today.  For the first 2 hours there will likely be some numbing in the joint from the lidocaine.  This is a good sign, indicating that the injection is in the right place.  In 2 hours the lidocaine will wear off, and the joint will hurt like you had a shot.  Each day the cortisone makes it feel better.  It reaches peak effect in 2 weeks.  We expect it to last for 3 months.  You may resume regular activity when you feel ready.  If you are diabetic, your glucoses will be quite high for several days.    Will add Diclofenac 75 mg oral twice daily if needed,

## 2022-05-19 NOTE — LETTER
5/19/2022         RE: Dylon Barragan  [address] [facility] [id number]        Dear Colleague,    Thank you for referring your patient, Dylon Barragan, to the [facility] [place]. Please see a copy of my visit note below.    Large Joint Injection/Arthocentesis: R knee joint    Date/Time: 5/19/2022 11:52 AM  Performed by: Zen Ramos MD  Authorized by: Zen Ramos MD     Indications:  Pain  Needle Size:  22 G  Guidance: landmark guided    Location:  Knee      Medications:  5 mL lidocaine 1 %; 80 mg methylPREDNISolone 80 MG/ML  Outcome:  Tolerated well, no immediate complications  Procedure discussed: discussed risks, benefits, and alternatives    Consent Given by:  Patient  Timeout: timeout called immediately prior to procedure    Prep: patient was prepped and draped in usual sterile fashion            Dylon Barragan is a 72 year old male who is seen in consultation at the request of Dr. Orville Galeas  for right knee pain.   He has had this come on the last few months.  He thinks it was aggravated with squats he was doing for therapy with knee strengthening.  He has a history of a stroke 20 years ago and therapy has given him exercises for balance and strengthening.  He also has some confusion and his wife reminded him of a fall he had about 2 months ago.  He has also been down on his knees helping move furniture and this aggravated his knee.  He reports dull constant pain rated 7 out of 10.  It is worse with stairs, twisting, pivoting.  It is better if he elevates the leg.  He has used ibuprofen up to 1200 mg/day for 2 weeks previously he is not currently taking it.  He has been using CBD oil.    He has had MRI scan showing significant chondromalacia of the patellofemoral joint, medial joint, lateral joint.  There was some free edge fraying of the posterior horn medial meniscus but no roger tears.  Certainly no flaps.  Ligaments were all intact.    Past  Medical History:   Diagnosis Date     Acute, but ill-defined, cerebrovascular disease 7/99    right brainstem with right hemiparesis and dysphagia left eye     Complication of anesthesia     half an airway due to stroke hx     Depressive disorder     Given meds for but think I'm ok     Dysphagia      Edema      Essential hypertension, benign      Mild intermittent asthma      Mixed hyperlipidemia      Other psoriasis      Personal history of unspecified circulatory disease      Primary osteoarthritis of right knee 5/19/2022     Sleep apnea     CPAP     Unspecified cerebral artery occlusion with cerebral infarction     1999     Ventral hernia, unspecified, without mention of obstruction or gangrene        Past Surgical History:   Procedure Laterality Date     ABDOMEN SURGERY  2000    Hernia     COLONOSCOPY  05/22/2006     COLONOSCOPY N/A 12/2/2016    Procedure: COLONOSCOPY;  Surgeon: Mj Mcfarland MD;  Location:  GI     HERNIA REPAIR  2002     REPAIR PTOSIS  10/18/2013    Procedure: REPAIR PTOSIS;  LEFT UPPER LID PTOSIS REPAIR;  Surgeon: Bienvenido Alejandra MD;  Location: [facility]     REPAIR PTOSIS BILATERAL  8/23/2013    Procedure: REPAIR PTOSIS BILATERAL;  BILATERAL UPPER LID PTOSIS AND MECHANICAL PTOSIS REPAIR ;  Surgeon: Bienvenido Alejandra MD;  Location:  [facility]     TONSILLECTOMY & ADENOIDECTOMY       ZZC APPENDECTOMY       Z NONSPECIFIC PROCEDURE      previous PEG from CVA     Z NONSPECIFIC PROCEDURE      incisional hernia repair from PEG site       Family History   Problem Relation Age of Onset     C.A.D. Father      Diabetes Father      Hypertension Father      Coronary Artery Disease Father      Hyperlipidemia Father      Substance Abuse Father         Alcohol     C.A.D. Paternal Grandfather      Coronary Artery Disease Paternal Grandfather      C.A.D. Paternal Uncle      Cancer Mother         skin     Hypertension Mother      Cerebrovascular Disease Mother      Other Cancer Mother      Thyroid  Disease Mother      Heart Disease Sister         valve replacement     Osteoporosis Sister      Genetic Disorder Sister         Heart     Thyroid Disease Sister      Coronary Artery Disease Maternal Grandmother      Coronary Artery Disease Paternal Grandmother         Congestive heart failure     Other Cancer Maternal Grandfather      Depression Sister      Anxiety Disorder Sister      Anesthesia Reaction Sister      Anesthesia Reaction Sister      Thyroid Disease Sister        Social History     Socioeconomic History     Marital status:      Spouse name: Not on file     Number of children: Not on file     Years of education: Not on file     Highest education level: Not on file   Occupational History     Not on file   Tobacco Use     Smoking status: Former Smoker     Packs/day: 1.00     Years: 20.00     Pack years: 20.00     Types: Cigarettes     Quit date: 1985     Years since quittin.1     Smokeless tobacco: Never Used     Tobacco comment: spouse smokes outside   Vaping Use     Vaping Use: Never used   Substance and Sexual Activity     Alcohol use: Yes     Alcohol/week: 0.0 standard drinks     Comment: Occasional     Drug use: Never     Sexual activity: Yes     Partners: Female     Birth control/protection: Post-menopausal     Comment: postmenopausal   Other Topics Concern     Parent/sibling w/ CABG, MI or angioplasty before 65F 55M? Yes     Comment: Father   Social History Narrative     Not on file     Social Determinants of Health     Financial Resource Strain: Not on file   Food Insecurity: Not on file   Transportation Needs: Not on file   Physical Activity: Not on file   Stress: Not on file   Social Connections: Not on file   Intimate Partner Violence: Not on file   Housing Stability: Not on file       Current Outpatient Medications   Medication Sig Dispense Refill     diclofenac (VOLTAREN) 75 MG EC tablet Take 1 tablet (75 mg) by mouth 2 times daily 60 tablet 11     albuterol (VENTOLIN HFA)  108 (90 Base) MCG/ACT inhaler INHALE 2 PUFFS BY MOUTH EVERY 6 HOURS AS NEEDED FOR SHORTNESS OF BREATH /DYSPNEA (Patient not taking: No sig reported) 18 g 2     aspirin 81 MG EC tablet Take 162 mg by mouth daily       atorvastatin (LIPITOR) 40 MG tablet TAKE 1 TABLET (40 MG) BY MOUTH DAILY 90 tablet 3     baclofen (LIORESAL) 10 MG tablet Take 20 mg by mouth 3 times daily       clonazePAM (KLONOPIN) 0.5 MG tablet Take 1 tablet (0.5 mg) by mouth 2 times daily (Patient taking differently: Take 0.5 mg by mouth 2 times daily Am and 4pm) 12 tablet 0     indapamide (LOZOL) 1.25 MG tablet TAKE 1 TABLET (1.25 MG) BY MOUTH EVERY MORNING 90 tablet 1     ketoconazole (NIZORAL) 2 % cream APPLY TO AFFECTED AREA  on ear, face and handsBID (Patient not taking: No sig reported) 60 g 2     nystatin (MYCOSTATIN) 558489 UNIT/GM external cream Apply topically 2 times daily Profile 60 g 1     order for DME Equipment ordered: RESMED Auto PAP Mask type: Full face  Settings: 6-15 CM H2O       PARoxetine (PAXIL-CR) 12.5 MG 24 hr tablet Take 1 tablet (12.5 mg) by mouth every morning 90 tablet 3     PARoxetine (PAXIL-CR) 25 MG 24 hr tablet Take 1 tablet (25 mg) by mouth every morning 90 tablet 3     propranolol (INDERAL) 80 MG tablet Take 80 mg by mouth 2 times daily         No Known Allergies    REVIEW OF SYSTEMS:  CONSTITUTIONAL:  NEGATIVE for fever, chills, change in weight, not feeling tired  SKIN:  NEGATIVE for worrisome rashes, no skin lumps, no skin ulcers and no non-healing wounds  EYES:  NEGATIVE for vision changes or irritation.  ENT/MOUTH:  NEGATIVE.  No hearing loss, no hoarseness, no difficulty swallowing.  RESP:  NEGATIVE. No cough or shortness of breath.  CV:  NEGATIVE for chest pain, palpitations or peripheral edema  GI:  NEGATIVE for nausea, abdominal pain, heartburn, or change in bowel habits  :  Negative. No dysuria, no hematuria  MUSCULOSKELETAL:  See HPI above  NEURO:  NEGATIVE . No headaches, no dizziness,  no  "numbness  ENDOCRINE:  NEGATIVE for temperature intolerance, skin/hair changes  HEME/ALLERGY/IMMUNE:  NEGATIVE for bleeding problems  PSYCHIATRIC:  NEGATIVE. no anxiety, no depression.      Exam:  Vitals: /77   Pulse 80   Resp 18   Ht 1.803 m (5' 11\")   Wt 93.4 kg (206 lb)   BMI 28.73 kg/m    BMI= Body mass index is 28.73 kg/m .  Constitutional:  healthy, alert and no distress  Neuro: Alert and Oriented x 3, Sensation grossly WNL.  HEENT:  Atraumatic, EOMI  Neck:  Neck supple with no tenderness.  Psych: Affect normal   Respiratory: Breathing not labored.  Cardiovascular: normal peripheral pulses  Lymph: no adenopathy  Skin: No rashes,worrisome lesions or skin problems  Spine: straight, no straight leg raising pain.  Hips show full range of motion.  There is no tenderness over the sacro-iliac joints, sciatic notch, or greater trochanters.   He has good range of motion of both knees from 0 to past 120 degrees.  He does have some patellofemoral crepitation on both knees.  He does have considerable sensitivity of the patella on the right.  He also has medial joint tenderness on the right.  No lateral tenderness.  He did not have significant pain with medial or lateral Isabelle's.  He had no ligamentous laxity of MCL, LCL, cruciates.  He does have a moderate effusion on the right knee.  Sensation, motor and circulation are intact.    Assessment:  Right knee osteoarthritis.  Plan:  He  desires injection today of right knee(s).  Risks, benefits, potential complications and alternatives were discussed.   With the patient's consent, sterile prep was performed of right knee(s).  Right knee was injected with Depo Medrol 80 mg and lidocaine at anteromedial site.  Return to clinic as needed.    I also gave prescription for diclofenac 75 mg twice daily in case the injection is not adequate.        Again, thank you for allowing me to participate in the care of your patient.        Sincerely,        Zen Ramos, " MD

## 2022-05-23 DIAGNOSIS — I10 ESSENTIAL HYPERTENSION WITH GOAL BLOOD PRESSURE LESS THAN 130/80: ICD-10-CM

## 2022-05-25 RX ORDER — INDAPAMIDE 1.25 MG/1
TABLET ORAL
Qty: 90 TABLET | Refills: 1 | Status: SHIPPED | OUTPATIENT
Start: 2022-05-25 | End: 2022-11-23

## 2022-06-23 NOTE — PROGRESS NOTES
[facility]    Outpatient Physical Therapy Discharge Note  Patient: Dylon Barragan  : 1950    Beginning/End Dates of Reporting Period:  22 to 3/7/22    Referring Provider: Dr. Ulloa.    Therapy Diagnosis: Generalized weakness.     Client Self Report: 6 min late. Had a little stomach flu bug so didn't do ex as much the last few days.  Twisted the L knee, not sure what he did.  Has a total gym option to use at home.    Outcome Measures (most recent score):  Not assessed at final session.    Goals:  Goal Identifier SIMMONS   Goal Description Pt will demonstrate Simmons balance assessment to 52/56 or better in order to demonstrate reduction in falls risk.   Target Date 22   Date Met      Progress (detail required for progress note):  Not assessed at final session.     Goal Identifier HEP    Goal Description Pt will be independent with HEP in order to improve LE strength and balance.   Target Date 22   Date Met   3/7/22   Progress (detail required for progress note):       Plan:  Discharge from therapy.    Discharge:    Reason for Discharge: Patient has failed to schedule further appointments.    Equipment Issued: None.    Discharge Plan: Patient to continue home program.

## 2022-06-29 ENCOUNTER — TRANSFERRED RECORDS (OUTPATIENT)
Dept: INTERNAL MEDICINE | Facility: CLINIC | Age: 72
End: 2022-06-29

## 2022-07-30 ENCOUNTER — HEALTH MAINTENANCE LETTER (OUTPATIENT)
Age: 72
End: 2022-07-30

## 2022-08-02 NOTE — PROGRESS NOTES
"[facility]    Outpatient Physical Therapy Discharge Note  Patient: Dylon Barragan    : 1950    Beginning/End Dates of Reporting Period: 22 to 3/7/22    Referring Provider: Dr. Mahsa Ulloa    Therapy Diagnosis: generalized weakness     Client Self Report: 6 min late. Had a little stomach flu bug so didn't do ex as much the last few days.  Twisted the L knee, not sure what he did.  Has a total gym option to use at home.    Objective Measurements: Pt was seen for a total of 3 visits of PT. He cancelled some appts due to knee issues and did not end up rescheduling so he will be discharged to his home program at this time. Notes from last visit: \"generally a wider KEEGAN, no outright LOB but does make corrections when walking with head turns, etc.; observed that L knee hyperextends with walking, pt reports R knee is the weaker one however, last session the R knee was hyperextending\".     Goals:  Goal Identifier SIMMONS   Goal Description Pt will demonstrate Simmons balance assessment to 52/56 or better in order to demonstrate reduction in falls risk.   Target Date 22   Date Met      Progress (detail required for progress note):       Goal Identifier HEP    Goal Description Pt will be independent with HEP in order to improve LE strength and balance.   Target Date 22   Date Met      Progress (detail required for progress note):       Plan: Discharge from therapy.    Reason for Discharge: Patient has failed to schedule further appointments.    Equipment Issued: none    Discharge Plan: Patient to continue home program.  "

## 2022-09-13 ASSESSMENT — ASTHMA QUESTIONNAIRES
ACT_TOTALSCORE: 25
QUESTION_4 LAST FOUR WEEKS HOW OFTEN HAVE YOU USED YOUR RESCUE INHALER OR NEBULIZER MEDICATION (SUCH AS ALBUTEROL): NOT AT ALL
QUESTION_5 LAST FOUR WEEKS HOW WOULD YOU RATE YOUR ASTHMA CONTROL: COMPLETELY CONTROLLED
QUESTION_3 LAST FOUR WEEKS HOW OFTEN DID YOUR ASTHMA SYMPTOMS (WHEEZING, COUGHING, SHORTNESS OF BREATH, CHEST TIGHTNESS OR PAIN) WAKE YOU UP AT NIGHT OR EARLIER THAN USUAL IN THE MORNING: NOT AT ALL
QUESTION_1 LAST FOUR WEEKS HOW MUCH OF THE TIME DID YOUR ASTHMA KEEP YOU FROM GETTING AS MUCH DONE AT WORK, SCHOOL OR AT HOME: NONE OF THE TIME
QUESTION_2 LAST FOUR WEEKS HOW OFTEN HAVE YOU HAD SHORTNESS OF BREATH: NOT AT ALL
ACT_TOTALSCORE: 25

## 2022-09-20 ENCOUNTER — OFFICE VISIT (OUTPATIENT)
Dept: FAMILY MEDICINE | Facility: OTHER | Age: 72
End: 2022-09-20
Payer: COMMERCIAL

## 2022-09-20 VITALS
BODY MASS INDEX: 29.4 KG/M2 | DIASTOLIC BLOOD PRESSURE: 72 MMHG | TEMPERATURE: 98 F | OXYGEN SATURATION: 97 % | WEIGHT: 210 LBS | RESPIRATION RATE: 20 BRPM | HEART RATE: 67 BPM | SYSTOLIC BLOOD PRESSURE: 128 MMHG | HEIGHT: 71 IN

## 2022-09-20 DIAGNOSIS — Z01.818 PREOP GENERAL PHYSICAL EXAM: Primary | ICD-10-CM

## 2022-09-20 DIAGNOSIS — I10 ESSENTIAL HYPERTENSION WITH GOAL BLOOD PRESSURE LESS THAN 130/80: ICD-10-CM

## 2022-09-20 DIAGNOSIS — Z23 HIGH PRIORITY FOR 2019-NCOV VACCINE: ICD-10-CM

## 2022-09-20 DIAGNOSIS — G47.33 OSA (OBSTRUCTIVE SLEEP APNEA): ICD-10-CM

## 2022-09-20 DIAGNOSIS — H26.9 CATARACT OF LEFT EYE, UNSPECIFIED CATARACT TYPE: ICD-10-CM

## 2022-09-20 DIAGNOSIS — E78.5 HYPERLIPIDEMIA LDL GOAL <130: ICD-10-CM

## 2022-09-20 PROCEDURE — 0134A COVID-19,PF,MODERNA BIVALENT: CPT | Performed by: PHYSICIAN ASSISTANT

## 2022-09-20 PROCEDURE — 99214 OFFICE O/P EST MOD 30 MIN: CPT | Performed by: PHYSICIAN ASSISTANT

## 2022-09-20 PROCEDURE — 91313 COVID-19,PF,MODERNA BIVALENT: CPT | Performed by: PHYSICIAN ASSISTANT

## 2022-09-20 ASSESSMENT — PAIN SCALES - GENERAL: PAINLEVEL: NO PAIN (0)

## 2022-09-20 NOTE — PROGRESS NOTES
[address]  [facility] [address]  Phone: [phone]  Primary Provider: Orville Galeas  Pre-op Performing Provider: ANA MARIE      PREOPERATIVE EVALUATION:  Today's date: 9/20/2022    Dylon Barragan is a 72 year old male who presents for a preoperative evaluation.    Surgical Information:  Surgery/Procedure: left eye cataract surgery  Surgery Location: [facility]  Surgeon: Dr. Mtz   Surgery Date: 9/26/22  Time of Surgery: 8:30am  Where patient plans to recover: At home with family  Fax number for surgical facility: [phone], [phone]    Type of Anesthesia Anticipated: to be determined    Assessment & Plan     The proposed surgical procedure is considered LOW risk.    (Z01.818) Preop general physical exam  (primary encounter diagnosis)  (H26.9) Cataract of left eye, unspecified cataract type  Comment: Patient presents to the clinic for preop examination for left eye cataract surgery. Patient states he has had minimal symptoms associated with the cataract, due to multiple other eye issues. However, he does report some increased difficulty reading close up.   Plan: Recommend proceeding with procedure.     (I10) Essential hypertension with goal blood pressure less than 130/80  Comment: Blood pressure 128/72 today in the clinic.  Plan: Continue blood pressure medications. Hold indapamide on the day of surgery.    (E78.5) Hyperlipidemia LDL goal <130  Comment: Hyperlipidemia controlled with atorvastatin. Patient denies medication side effects.  Plan: Continue atorvastatin.    (G47.33) TED (obstructive sleep apnea)  Comment: Patient states his sleep apnea has been managed well with CPAP.     (Z23) High priority for 2019-nCoV vaccine  Comment: Due for COVID booster.  Plan: COVID-19,PF,MODERNA BIVALENT 18+Yrs      Risks and Recommendations:  The patient has the following additional risks and recommendations for perioperative  complications:  Obstructive Sleep Apnea: Patient uses CPAP at night.    Medication Instructions:   - aspirin: Bleeding risk is low for this procedure and daily aspirin may be continued without modification.    - Beta Blockers: Continue taking on the day of surgery.   - Diuretics: HOLD on the day of surgery.   - Statins: Continue taking on the day of surgery.    - ibuprofen (Advil, Motrin): HOLD 1 day before surgery.    - Benzodiazepines: Continue without modification.   - SSRIs, SNRIs, TCAs, Antipsychotics: Continue without modification.     RECOMMENDATION:  APPROVAL GIVEN to proceed with proposed procedure, without further diagnostic evaluation.        Subjective   HPI related to upcoming procedure:   - left eye cataract surgery  - getting harder to see close up  - more light helps with symptoms    Preop Questions 9/13/2022   1. Have you ever had a heart attack or stroke? YES - CVA 7/1/1999   2. Have you ever had surgery on your heart or blood vessels, such as a stent placement, a coronary artery bypass, or surgery on an artery in your head, neck, heart, or legs? No   3. Do you have chest pain with activity? No   4. Do you have a history of  heart failure? No   5. Do you currently have a cold, bronchitis or symptoms of other infection? No   6. Do you have a cough, shortness of breath, or wheezing? No   7. Do you or anyone in your family have previous history of blood clots? Yes; personal history of CVA in 1999, father had MI   8. Do you or does anyone in your family have a serious bleeding problem such as prolonged bleeding following surgeries or cuts? No   9. Have you ever had problems with anemia or been told to take iron pills? No   10. Have you had any abnormal blood loss such as black, tarry or bloody stools? No   11. Have you ever had a blood transfusion? No   12. Are you willing to have a blood transfusion if it is medically needed before, during, or after your surgery? Yes   13. Have you or any of your  relatives ever had problems with anesthesia? YES - two sisters had an unknown reaction to anesthesia   14. Do you have sleep apnea, excessive snoring or daytime drowsiness? YES - history of sleep apnea; uses CPAP   14a. Do you have a CPAP machine? Yes   15. Do you have any artifical heart valves or other implanted medical devices like a pacemaker, defibrillator, or continuous glucose monitor? No   16. Do you have artificial joints? No   17. Are you allergic to latex? No     Health Care Directive:  Patient does not have a Health Care Directive or Living Will: Advance Directive received and scanned. Click on Code in the patient header to view.    Preoperative Review of :   reviewed - controlled substances reflected in medication list.      Has appointment with neurology in Nov for dizziness/balance problems    Review of Systems  CONSTITUTIONAL: NEGATIVE for fever, chills, change in weight  INTEGUMENTARY/SKIN: NEGATIVE for worrisome rashes, moles or lesions  EYES: POSITIVE for blurred vision, worse close up  ENT/MOUTH: NEGATIVE for ear, mouth and throat problems  RESP: NEGATIVE for significant cough or SOB  CV: NEGATIVE for chest pain, palpitations or peripheral edema  GI: NEGATIVE for nausea, abdominal pain, heartburn, or change in bowel habits  : NEGATIVE for frequency, dysuria, or hematuria  MUSCULOSKELETAL: NEGATIVE for significant arthralgias or myalgia  NEURO: POSITIVE for dizziness, feeling unbalanced, and tinnitus  ENDOCRINE: NEGATIVE for temperature intolerance, skin/hair changes  HEME: NEGATIVE for bleeding problems  PSYCHIATRIC: NEGATIVE for changes in mood or affect    Patient Active Problem List    Diagnosis Date Noted     Primary osteoarthritis of right knee 05/19/2022     Priority: Medium     Other sequelae following unspecified cerebrovascular disease 08/04/2021     Priority: Medium     Essential hypertension with goal blood pressure less than 130/80 08/30/2016     Priority: Medium      Adjustment disorder with depressed mood 12/22/2015     Priority: Medium     Acute bronchitis 07/09/2015     Priority: Medium     TED (obstructive sleep apnea) 06/25/2013     Priority: Medium     Advanced directives, counseling/discussion 01/23/2012     Priority: Medium     Advance Directive Problem List Overview:   Name Relationship Phone    Primary Health Care Agent            Alternative Health Care Agent          Discussed advance care planning with patient; however, patient declined at this time. 1/23/2012          HYPERLIPIDEMIA LDL GOAL <130 10/31/2010     Priority: Medium     GERD (gastroesophageal reflux disease) 09/25/2008     Priority: Medium     Acute, but ill-defined, cerebrovascular disease      Priority: Medium     right brainstem        Past Medical History:   Diagnosis Date     Acute, but ill-defined, cerebrovascular disease 07/1999    right brainstem with right hemiparesis and dysphagia left eye     Complication of anesthesia     half an airway due to stroke hx     Depressive disorder     Given meds for but think I'm ok     Dysphagia      Edema      Essential hypertension, benign      Mild intermittent asthma      Mixed hyperlipidemia      Other psoriasis      Personal history of unspecified circulatory disease      Primary osteoarthritis of right knee 05/19/2022     Sleep apnea     CPAP     Unspecified cerebral artery occlusion with cerebral infarction 07/01/1999     Ventral hernia, unspecified, without mention of obstruction or gangrene      Past Surgical History:   Procedure Laterality Date     ABDOMEN SURGERY  2000    Hernia     COLONOSCOPY  05/22/2006     COLONOSCOPY N/A 12/2/2016    Procedure: COLONOSCOPY;  Surgeon: Mj Mcfarland MD;  Location:  GI     HERNIA REPAIR  2002     REPAIR PTOSIS  10/18/2013    Procedure: REPAIR PTOSIS;  LEFT UPPER LID PTOSIS REPAIR;  Surgeon: Bienvenido Alejandra MD;  [facility]     REPAIR PTOSIS BILATERAL  8/23/2013    Procedure: REPAIR PTOSIS  BILATERAL;  BILATERAL UPPER LID PTOSIS AND MECHANICAL PTOSIS REPAIR ;  Surgeon: Bienvenido Alejandra MD;  Location:  [facility]     TONSILLECTOMY & ADENOIDECTOMY       ZZC APPENDECTOMY       ZZC NONSPECIFIC PROCEDURE      previous PEG from CVA     ZZC NONSPECIFIC PROCEDURE      incisional hernia repair from PEG site     Current Outpatient Medications   Medication Sig Dispense Refill     aspirin 81 MG EC tablet Take 162 mg by mouth daily       atorvastatin (LIPITOR) 40 MG tablet TAKE 1 TABLET (40 MG) BY MOUTH DAILY 90 tablet 3     baclofen (LIORESAL) 10 MG tablet Take 20 mg by mouth 3 times daily       clonazePAM (KLONOPIN) 0.5 MG tablet Take 1 tablet (0.5 mg) by mouth 2 times daily (Patient taking differently: Take 0.5 mg by mouth 2 times daily Am and 4pm) 12 tablet 0     indapamide (LOZOL) 1.25 MG tablet TAKE ONE TABLET BY MOUTH EVERY MORNING 90 tablet 1     ketoconazole (NIZORAL) 2 % cream APPLY TO AFFECTED AREA  on ear, face and handsBID (Patient taking differently: APPLY TO AFFECTED AREA  on ear, face and handsBID) 60 g 2     nystatin (MYCOSTATIN) 610138 UNIT/GM external cream Apply topically 2 times daily Profile 60 g 1     PARoxetine (PAXIL-CR) 25 MG 24 hr tablet Take 1 tablet (25 mg) by mouth every morning 90 tablet 3     propranolol (INDERAL) 80 MG tablet Take 80 mg by mouth 2 times daily       albuterol (VENTOLIN HFA) 108 (90 Base) MCG/ACT inhaler INHALE 2 PUFFS BY MOUTH EVERY 6 HOURS AS NEEDED FOR SHORTNESS OF BREATH /DYSPNEA (Patient not taking: Reported on 9/20/2022) 18 g 2     diclofenac (VOLTAREN) 75 MG EC tablet Take 1 tablet (75 mg) by mouth 2 times daily (Patient not taking: Reported on 9/20/2022) 60 tablet 11     order for DME Equipment ordered: RESMED Auto PAP Mask type: Full face  Settings: 6-15 CM H2O       No Known Allergies     Social History     Tobacco Use     Smoking status: Former Smoker     Packs/day: 1.00     Years: 20.00     Pack years: 20.00     Types: Cigarettes     Quit date: 4/1/1985      "Years since quittin.4     Smokeless tobacco: Never Used     Tobacco comment: spouse smokes outside   Substance Use Topics     Alcohol use: Yes     Comment: Occasional     History   Drug Use Unknown         Objective   /72 (BP Location: Right arm, Patient Position: Sitting, Cuff Size: Adult Large)   Pulse 67   Temp 98  F (36.7  C) (Temporal)   Resp 20   Ht 1.803 m (5' 11\")   Wt 95.3 kg (210 lb)   SpO2 97%   BMI 29.29 kg/m    Physical Exam    GENERAL APPEARANCE: healthy, alert and no distress     EYES: EOMI,  PERRL     HENT: ear canals and TM's normal and nose and mouth without ulcers or lesions     NECK: no adenopathy, no asymmetry, masses, or scars and thyroid normal to palpation     RESP: lungs clear to auscultation - no rales, rhonchi or wheezes     CV: regular rates and rhythm, normal S1 S2, no S3 or S4 and no murmur, click or rub     ABDOMEN:  soft, nontender, no HSM or masses and bowel sounds normal     MS: extremities normal- no gross deformities noted, no evidence of inflammation in joints, FROM in all extremities.     SKIN: no suspicious lesions or rashes     NEURO: Normal strength and tone, sensory exam grossly normal, mentation intact and speech normal     PSYCH: mentation appears normal. and affect normal/bright     LYMPHATICS: No cervical adenopathy    Recent Labs   Lab Test 22  1557 21  0831   HGB 14.5  --    *  --     141   POTASSIUM 3.8 3.7   CR 0.95 0.79        Diagnostics:  No results found for this or any previous visit (from the past 24 hour(s)).   No EKG required for low risk surgery (cataract, skin procedure, breast biopsy, etc).    Revised Cardiac Risk Index (RCRI):  The patient has the following serious cardiovascular risks for perioperative complications:   - Cerebrovascular Disease (TIA or CVA) = 1 point   RCRI Interpretation: 1 point: Class II (low risk - 0.9% complication rate)         Sanam GARCIA PA-C, was present with the Physician " Assistant student who participated in the service and in the documentation of the note.  I have verified the history and personally performed the physical exam and medical decision making.  I agree with the assessment and plan of care as documented in the note.       BROCK Walters    Signed Electronically by: Sanam Almanzar PA-C  Copy of this evaluation report is provided to requesting physician.

## 2022-09-20 NOTE — PATIENT INSTRUCTIONS
HOLD your indapamide morning of procedure and resume afterwards.   Preparing for Your Surgery  Getting started  A nurse will call you to review your health history and instructions. They will give you an arrival time based on your scheduled surgery time. Please be ready to share:  Your doctor's clinic name and phone number  Your medical, surgical and anesthesia history  A list of allergies and sensitivities  A list of medicines, including herbal treatments and over-the-counter drugs  Whether the patient has a legal guardian (ask how to send us the papers in advance)  Please tell us if you're pregnant--or if there's any chance you might be pregnant. Some surgeries may injure a fetus (unborn baby), so they require a pregnancy test. Surgeries that are safe for a fetus don't always need a test, and you can choose whether to have one.   If you have a child who's having surgery, please ask for a copy of Preparing for Your Child's Surgery.    Preparing for surgery  Within 30 days of surgery: Have a pre-op exam (sometimes called an H&P, or History and Physical). This can be done at a clinic or pre-operative center.  If you're having a , you may not need this exam. Talk to your care team.  At your pre-op exam, talk to your care team about all medicines you take. If you need to stop any medicines before surgery, ask when to start taking them again.  We do this for your safety. Many medicines can make you bleed too much during surgery. Some change how well surgery (anesthesia) drugs work.  Call your insurance company to let them know you're having surgery. (If you don't have insurance, call [phone].)  Call your clinic if there's any change in your health. This includes signs of a cold or flu (sore throat, runny nose, cough, rash, fever). It also includes a scrape or scratch near the surgery site.  If you have questions on the day of surgery, call your hospital or surgery center.  COVID testing  You may need to be  tested for COVID-19 before having surgery. If so, we will give you instructions.  Eating and drinking guidelines  For your safety: Unless your surgeon tells you otherwise, follow the guidelines below.  Eat and drink as usual until 8 hours before surgery. After that, no food or milk.  Drink clear liquids until 2 hours before surgery. These are liquids you can see through, like water, Gatorade and Propel Water. You may also have black coffee and tea (no cream or milk).  Nothing by mouth within 2 hours of surgery. This includes gum, candy and breath mints.  If you drink alcohol: Stop drinking it the night before surgery.  If your care team tells you to take medicine on the morning of surgery, it's okay to take it with a sip of water.  Preventing infection  Shower or bathe the night before and morning of your surgery. Follow the instructions your clinic gave you. (If no instructions, use regular soap.)  Don't shave or clip hair near your surgery site. We'll remove the hair if needed.  Don't smoke or vape the morning of surgery. You may chew nicotine gum up to 2 hours before surgery. A nicotine patch is okay.  Note: Some surgeries require you to completely quit smoking and nicotine. Check with your surgeon.  Your care team will make every effort to keep you safe from infection. We will:  Clean our hands often with soap and water (or an alcohol-based hand rub).  Clean the skin at your surgery site with a special soap that kills germs.  Give you a special gown to keep you warm. (Cold raises the risk of infection.)  Wear special hair covers, masks, gowns and gloves during surgery.  Give antibiotic medicine, if prescribed. Not all surgeries need antibiotics.  What to bring on the day of surgery  Photo ID and insurance card  Copy of your health care directive, if you have one  Glasses and hearing aides (bring cases)  You can't wear contacts during surgery  Inhaler and eye drops, if you use them (tell us about these when you  arrive)  CPAP machine or breathing device, if you use them  A few personal items, if spending the night  If you have . . .  A pacemaker, ICD (cardiac defibrillator) or other implant: Bring the ID card.  An implanted stimulator: Bring the remote control.  A legal guardian: Bring a copy of the certified (court-stamped) guardianship papers.  Please remove any jewelry, including body piercings. Leave jewelry and other valuables at home.  If you're going home the day of surgery  You must have a responsible adult drive you home. They should stay with you overnight as well.  If you don't have someone to stay with you, and you aren't safe to go home alone, we may keep you overnight. Insurance often won't pay for this.  After surgery  If it's hard to control your pain or you need more pain medicine, please call your surgeon's office.  Questions?   If you have any questions for your care team, list them here: _________________________________________________________________________________________________________________________________________________________________________ ____________________________________ ____________________________________ ____________________________________  For informational purposes only. Not to replace the advice of your health care provider. Copyright   2003, 2019 [facility]. All rights reserved. Clinically reviewed by Fátima Padilla MD. [employer] [id number] - REV 07/21.

## 2022-10-09 ENCOUNTER — HEALTH MAINTENANCE LETTER (OUTPATIENT)
Age: 72
End: 2022-10-09

## 2022-11-02 ENCOUNTER — TRANSFERRED RECORDS (OUTPATIENT)
Dept: HEALTH INFORMATION MANAGEMENT | Facility: CLINIC | Age: 72
End: 2022-11-02

## 2022-11-02 DIAGNOSIS — R26.9 ABNORMALITY OF GAIT: Primary | ICD-10-CM

## 2022-11-04 ENCOUNTER — LAB (OUTPATIENT)
Dept: LAB | Facility: OTHER | Age: 72
End: 2022-11-04
Payer: COMMERCIAL

## 2022-11-04 DIAGNOSIS — R26.9 ABNORMALITY OF GAIT: ICD-10-CM

## 2022-11-04 DIAGNOSIS — E78.5 HYPERLIPIDEMIA LDL GOAL <130: ICD-10-CM

## 2022-11-04 DIAGNOSIS — Z13.220 SCREENING FOR HYPERLIPIDEMIA: ICD-10-CM

## 2022-11-04 LAB
CHOLEST SERPL-MCNC: 117 MG/DL
CK SERPL-CCNC: 71 U/L (ref 30–300)
CREAT UR-MCNC: 211 MG/DL
FASTING STATUS PATIENT QL REPORTED: NO
HDLC SERPL-MCNC: 42 MG/DL
LDLC SERPL CALC-MCNC: 48 MG/DL
MICROALBUMIN UR-MCNC: 29 MG/L
MICROALBUMIN/CREAT UR: 13.74 MG/G CR (ref 0–17)
NONHDLC SERPL-MCNC: 75 MG/DL
TRIGL SERPL-MCNC: 133 MG/DL

## 2022-11-04 PROCEDURE — 36415 COLL VENOUS BLD VENIPUNCTURE: CPT

## 2022-11-04 PROCEDURE — 80061 LIPID PANEL: CPT

## 2022-11-04 PROCEDURE — 82043 UR ALBUMIN QUANTITATIVE: CPT

## 2022-11-04 PROCEDURE — 82550 ASSAY OF CK (CPK): CPT

## 2022-11-21 DIAGNOSIS — I10 ESSENTIAL HYPERTENSION WITH GOAL BLOOD PRESSURE LESS THAN 130/80: ICD-10-CM

## 2022-11-22 ENCOUNTER — ALLIED HEALTH/NURSE VISIT (OUTPATIENT)
Dept: INTERNAL MEDICINE | Facility: CLINIC | Age: 72
End: 2022-11-22

## 2022-11-22 VITALS — DIASTOLIC BLOOD PRESSURE: 72 MMHG | SYSTOLIC BLOOD PRESSURE: 128 MMHG

## 2022-11-22 DIAGNOSIS — I10 BENIGN ESSENTIAL HYPERTENSION: Primary | ICD-10-CM

## 2022-11-22 NOTE — PROGRESS NOTES
Dylon' blood past 6 months of blood pressure readings were reviewed by pharmacist at [facility] on 11/22/2022. Recent blood pressure history listed below:    BP Readings from Last 3 Encounters:   09/20/22 128/72   09/20/22 128/72   05/19/22 131/77     Pulse Readings from Last 3 Encounters:   09/20/22 67   05/19/22 80   04/27/22 82       Due to recent blood pressure readings at goal, blood pressure check at pharmacy not needed today. Pharmacy will continue to follow in [facility] program. Next blood pressure check due 3/20/2023.  Note completed by: SHONA ABRAHAM RPH

## 2022-11-23 RX ORDER — INDAPAMIDE 1.25 MG/1
TABLET ORAL
Qty: 90 TABLET | Refills: 1 | Status: SHIPPED | OUTPATIENT
Start: 2022-11-23 | End: 2023-05-24

## 2022-11-26 DIAGNOSIS — E78.5 HYPERLIPIDEMIA LDL GOAL <130: ICD-10-CM

## 2022-12-01 RX ORDER — ATORVASTATIN CALCIUM 40 MG/1
40 TABLET, FILM COATED ORAL DAILY
Qty: 90 TABLET | Refills: 3 | Status: SHIPPED | OUTPATIENT
Start: 2022-12-01 | End: 2023-07-24

## 2022-12-12 DIAGNOSIS — G47.33 OSA (OBSTRUCTIVE SLEEP APNEA): Primary | ICD-10-CM

## 2023-04-06 ENCOUNTER — MYC MEDICAL ADVICE (OUTPATIENT)
Dept: INTERNAL MEDICINE | Facility: CLINIC | Age: 73
End: 2023-04-06
Payer: COMMERCIAL

## 2023-04-07 ENCOUNTER — E-VISIT (OUTPATIENT)
Dept: INTERNAL MEDICINE | Facility: CLINIC | Age: 73
End: 2023-04-07
Payer: COMMERCIAL

## 2023-04-07 ENCOUNTER — APPOINTMENT (OUTPATIENT)
Dept: LAB | Facility: OTHER | Age: 73
End: 2023-04-07
Payer: COMMERCIAL

## 2023-04-07 DIAGNOSIS — Z20.818 STREPTOCOCCUS EXPOSURE: Primary | ICD-10-CM

## 2023-04-07 LAB
DEPRECATED S PYO AG THROAT QL EIA: NEGATIVE
GROUP A STREP BY PCR: NOT DETECTED

## 2023-04-07 PROCEDURE — 99421 OL DIG E/M SVC 5-10 MIN: CPT | Performed by: INTERNAL MEDICINE

## 2023-04-07 PROCEDURE — 87651 STREP A DNA AMP PROBE: CPT | Performed by: INTERNAL MEDICINE

## 2023-04-07 NOTE — PATIENT INSTRUCTIONS
Thank you for choosing us for your care. Given your symptoms, I would like you to do a lab-only visit to determine what is causing them.  I have placed the orders.  Please schedule an appointment with the lab [place], or call [phone].  I will let you know when the results are back and next steps to take.

## 2023-04-30 DIAGNOSIS — F41.9 ANXIETY: ICD-10-CM

## 2023-05-02 RX ORDER — PAROXETINE HYDROCHLORIDE HEMIHYDRATE 25 MG/1
25 TABLET, FILM COATED, EXTENDED RELEASE ORAL EVERY MORNING
Qty: 90 TABLET | Refills: 3 | Status: SHIPPED | OUTPATIENT
Start: 2023-05-02 | End: 2023-07-24

## 2023-05-02 NOTE — TELEPHONE ENCOUNTER
"Requested Prescriptions   Pending Prescriptions Disp Refills    PARoxetine (PAXIL-CR) 25 MG 24 hr tablet [Pharmacy Med Name: PAROXETINE HCL ER 25MG TB24] 90 tablet 3     Sig: Take 1 tablet (25 mg) by mouth every morning       SSRIs Protocol Failed - 4/30/2023  2:29 PM        Failed - Recent (12 mo) or future (30 days) visit within the authorizing provider's specialty     Patient has had an office visit with the authorizing provider or a provider within the authorizing providers department within the previous 12 mos or has a future within next 30 days. See \"Patient Info\" tab in inbasket, or \"Choose Columns\" in Meds & Orders section of the refill encounter.              Passed - Medication is active on med list        Passed - Patient is age 18 or older             "

## 2023-05-17 NOTE — TELEPHONE ENCOUNTER
Addended by: IRISH ALVES on: 5/17/2023 08:42 AM     Modules accepted: Orders     Prescription approved per [facility] Refill Protocol.  Opal Davis RN

## 2023-05-22 DIAGNOSIS — I10 ESSENTIAL HYPERTENSION WITH GOAL BLOOD PRESSURE LESS THAN 130/80: ICD-10-CM

## 2023-05-24 RX ORDER — INDAPAMIDE 1.25 MG/1
TABLET ORAL
Qty: 90 TABLET | Refills: 0 | Status: SHIPPED | OUTPATIENT
Start: 2023-05-24 | End: 2023-07-24

## 2023-05-24 NOTE — TELEPHONE ENCOUNTER
Future Appointments 5/24/2023 - 11/20/2023    None        Sending to scheduling for yearly office visit due    Lozol  Medication is being filled for 1 time refill only due to:  Patient needs to be seen because it has been more than one year since last visit. - labs not current

## 2023-06-07 NOTE — TELEPHONE ENCOUNTER
Patient already has appointment scheduled for 7/24/23 with Dr. Galeas.     Closing encounter.   Franchesca Viera MA

## 2023-07-17 ENCOUNTER — TELEPHONE (OUTPATIENT)
Dept: INTERNAL MEDICINE | Facility: CLINIC | Age: 73
End: 2023-07-17
Payer: COMMERCIAL

## 2023-07-17 ENCOUNTER — DOCUMENTATION ONLY (OUTPATIENT)
Dept: INTERNAL MEDICINE | Facility: CLINIC | Age: 73
End: 2023-07-17
Payer: COMMERCIAL

## 2023-07-17 DIAGNOSIS — R73.09 ELEVATED GLUCOSE: ICD-10-CM

## 2023-07-17 DIAGNOSIS — E78.5 HYPERLIPIDEMIA LDL GOAL <130: ICD-10-CM

## 2023-07-17 DIAGNOSIS — I10 ESSENTIAL HYPERTENSION WITH GOAL BLOOD PRESSURE LESS THAN 130/80: Primary | Chronic | ICD-10-CM

## 2023-07-17 ASSESSMENT — ACTIVITIES OF DAILY LIVING (ADL): CURRENT_FUNCTION: TRANSPORTATION REQUIRES ASSISTANCE

## 2023-07-17 ASSESSMENT — ENCOUNTER SYMPTOMS
DIARRHEA: 1
DYSURIA: 0
HEADACHES: 0
NAUSEA: 0
MYALGIAS: 1
HEMATOCHEZIA: 0
COUGH: 0
SORE THROAT: 0
FREQUENCY: 0
JOINT SWELLING: 1
CHILLS: 0
HEMATURIA: 0
PARESTHESIAS: 0
NERVOUS/ANXIOUS: 1
HEARTBURN: 0
ABDOMINAL PAIN: 0
PALPITATIONS: 0
CONSTIPATION: 0
ARTHRALGIAS: 1
SHORTNESS OF BREATH: 0
EYE PAIN: 0
WEAKNESS: 1
FEVER: 0
DIZZINESS: 1

## 2023-07-17 ASSESSMENT — ASTHMA QUESTIONNAIRES: ACT_TOTALSCORE: 25

## 2023-07-17 NOTE — TELEPHONE ENCOUNTER
Patient has his AWV on 07/24/23 and would like to get his labs done this Wednesday 07/19/23.  Patient has a lab appointment in ER on Wednesday at 9am.    He would like to have the follow labs done:  - Cholesterol  - PSA  - A1c  - any other labs recommended  He states that he has not been seen for a couple years so wants to be sure he gets all important labs done.

## 2023-07-17 NOTE — PROGRESS NOTES
This patient has an appointment for lab work but does not have any orders. Please place some future orders as needed.    Thank You,  Katarina Albert MLT (ASCP)

## 2023-07-19 ENCOUNTER — LAB (OUTPATIENT)
Dept: LAB | Facility: OTHER | Age: 73
End: 2023-07-19
Payer: COMMERCIAL

## 2023-07-19 DIAGNOSIS — E78.5 HYPERLIPIDEMIA LDL GOAL <130: ICD-10-CM

## 2023-07-19 DIAGNOSIS — I10 ESSENTIAL HYPERTENSION WITH GOAL BLOOD PRESSURE LESS THAN 130/80: Chronic | ICD-10-CM

## 2023-07-19 DIAGNOSIS — E11.9 TYPE 2 DIABETES MELLITUS WITHOUT COMPLICATION, WITHOUT LONG-TERM CURRENT USE OF INSULIN (H): ICD-10-CM

## 2023-07-19 DIAGNOSIS — R73.09 ELEVATED GLUCOSE: ICD-10-CM

## 2023-07-19 DIAGNOSIS — Z12.5 SCREENING FOR PROSTATE CANCER: ICD-10-CM

## 2023-07-19 DIAGNOSIS — R53.83 FATIGUE, UNSPECIFIED TYPE: ICD-10-CM

## 2023-07-19 LAB
ALBUMIN SERPL BCG-MCNC: 4.3 G/DL (ref 3.5–5.2)
ALP SERPL-CCNC: 97 U/L (ref 40–129)
ALT SERPL W P-5'-P-CCNC: 25 U/L (ref 0–70)
ANION GAP SERPL CALCULATED.3IONS-SCNC: 8 MMOL/L (ref 7–15)
AST SERPL W P-5'-P-CCNC: 27 U/L (ref 0–45)
BILIRUB SERPL-MCNC: 0.9 MG/DL
BUN SERPL-MCNC: 14 MG/DL (ref 8–23)
CALCIUM SERPL-MCNC: 9.3 MG/DL (ref 8.8–10.2)
CHLORIDE SERPL-SCNC: 101 MMOL/L (ref 98–107)
CHOLEST SERPL-MCNC: 135 MG/DL
CREAT SERPL-MCNC: 0.89 MG/DL (ref 0.67–1.17)
CREAT UR-MCNC: 133.2 MG/DL
DEPRECATED HCO3 PLAS-SCNC: 32 MMOL/L (ref 22–29)
GFR SERPL CREATININE-BSD FRML MDRD: 90 ML/MIN/1.73M2
GLUCOSE SERPL-MCNC: 136 MG/DL (ref 70–99)
HBA1C MFR BLD: 6.5 % (ref 0–5.6)
HDLC SERPL-MCNC: 40 MG/DL
LDLC SERPL CALC-MCNC: 65 MG/DL
MICROALBUMIN UR-MCNC: <12 MG/L
MICROALBUMIN/CREAT UR: NORMAL MG/G{CREAT}
NONHDLC SERPL-MCNC: 95 MG/DL
POTASSIUM SERPL-SCNC: 3.9 MMOL/L (ref 3.4–5.3)
PROT SERPL-MCNC: 7.4 G/DL (ref 6.4–8.3)
SODIUM SERPL-SCNC: 141 MMOL/L (ref 136–145)
TRIGL SERPL-MCNC: 150 MG/DL

## 2023-07-19 PROCEDURE — 80053 COMPREHEN METABOLIC PANEL: CPT

## 2023-07-19 PROCEDURE — G0103 PSA SCREENING: HCPCS

## 2023-07-19 PROCEDURE — 80061 LIPID PANEL: CPT

## 2023-07-19 PROCEDURE — 82570 ASSAY OF URINE CREATININE: CPT

## 2023-07-19 PROCEDURE — 36415 COLL VENOUS BLD VENIPUNCTURE: CPT

## 2023-07-19 PROCEDURE — 84443 ASSAY THYROID STIM HORMONE: CPT

## 2023-07-19 PROCEDURE — 84439 ASSAY OF FREE THYROXINE: CPT

## 2023-07-19 PROCEDURE — 83036 HEMOGLOBIN GLYCOSYLATED A1C: CPT

## 2023-07-19 PROCEDURE — 82043 UR ALBUMIN QUANTITATIVE: CPT

## 2023-07-24 ENCOUNTER — OFFICE VISIT (OUTPATIENT)
Dept: INTERNAL MEDICINE | Facility: CLINIC | Age: 73
End: 2023-07-24
Payer: COMMERCIAL

## 2023-07-24 VITALS
WEIGHT: 203 LBS | HEIGHT: 71 IN | SYSTOLIC BLOOD PRESSURE: 120 MMHG | RESPIRATION RATE: 16 BRPM | BODY MASS INDEX: 28.42 KG/M2 | TEMPERATURE: 98.2 F | OXYGEN SATURATION: 97 % | HEART RATE: 62 BPM | DIASTOLIC BLOOD PRESSURE: 66 MMHG

## 2023-07-24 DIAGNOSIS — Z12.5 SCREENING FOR PROSTATE CANCER: ICD-10-CM

## 2023-07-24 DIAGNOSIS — E11.9 TYPE 2 DIABETES MELLITUS WITHOUT COMPLICATION, WITHOUT LONG-TERM CURRENT USE OF INSULIN (H): ICD-10-CM

## 2023-07-24 DIAGNOSIS — Z00.00 ENCOUNTER FOR MEDICARE ANNUAL WELLNESS EXAM: Primary | ICD-10-CM

## 2023-07-24 DIAGNOSIS — F41.9 ANXIETY: ICD-10-CM

## 2023-07-24 DIAGNOSIS — R53.83 FATIGUE, UNSPECIFIED TYPE: ICD-10-CM

## 2023-07-24 DIAGNOSIS — L21.0 SEBORRHEA CAPITIS IN ADULT: ICD-10-CM

## 2023-07-24 DIAGNOSIS — E78.5 HYPERLIPIDEMIA LDL GOAL <130: ICD-10-CM

## 2023-07-24 DIAGNOSIS — I10 ESSENTIAL HYPERTENSION WITH GOAL BLOOD PRESSURE LESS THAN 130/80: ICD-10-CM

## 2023-07-24 LAB
PSA SERPL DL<=0.01 NG/ML-MCNC: 1.22 NG/ML (ref 0–6.5)
T4 FREE SERPL-MCNC: 1.02 NG/DL (ref 0.9–1.7)
TSH SERPL DL<=0.005 MIU/L-ACNC: 4.38 UIU/ML (ref 0.3–4.2)

## 2023-07-24 PROCEDURE — 99214 OFFICE O/P EST MOD 30 MIN: CPT | Mod: 25 | Performed by: INTERNAL MEDICINE

## 2023-07-24 PROCEDURE — G0439 PPPS, SUBSEQ VISIT: HCPCS | Performed by: INTERNAL MEDICINE

## 2023-07-24 RX ORDER — PAROXETINE HYDROCHLORIDE HEMIHYDRATE 25 MG/1
25 TABLET, FILM COATED, EXTENDED RELEASE ORAL EVERY MORNING
Qty: 90 TABLET | Refills: 3 | Status: SHIPPED | OUTPATIENT
Start: 2023-07-24 | End: 2024-07-29

## 2023-07-24 RX ORDER — INDAPAMIDE 1.25 MG/1
1.25 TABLET ORAL EVERY MORNING
Qty: 90 TABLET | Refills: 3 | Status: SHIPPED | OUTPATIENT
Start: 2023-07-24 | End: 2024-08-02

## 2023-07-24 RX ORDER — ATORVASTATIN CALCIUM 40 MG/1
40 TABLET, FILM COATED ORAL DAILY
Qty: 90 TABLET | Refills: 3 | Status: SHIPPED | OUTPATIENT
Start: 2023-07-24 | End: 2024-08-02

## 2023-07-24 RX ORDER — TRIAMCINOLONE ACETONIDE 1 MG/G
CREAM TOPICAL 2 TIMES DAILY
Qty: 45 G | Refills: 1 | Status: SHIPPED | OUTPATIENT
Start: 2023-07-24

## 2023-07-24 ASSESSMENT — ENCOUNTER SYMPTOMS
COUGH: 0
SHORTNESS OF BREATH: 0
SORE THROAT: 0
WEAKNESS: 1
HEMATOCHEZIA: 0
NERVOUS/ANXIOUS: 1
JOINT SWELLING: 1
FEVER: 0
PALPITATIONS: 0
FREQUENCY: 0
HEMATURIA: 0
DIARRHEA: 1
EYE PAIN: 0
DYSURIA: 0
PARESTHESIAS: 0
NAUSEA: 0
DIZZINESS: 1
ARTHRALGIAS: 1
MYALGIAS: 1
HEADACHES: 0
CHILLS: 0
ABDOMINAL PAIN: 0
HEARTBURN: 0
CONSTIPATION: 0

## 2023-07-24 ASSESSMENT — ACTIVITIES OF DAILY LIVING (ADL): CURRENT_FUNCTION: TRANSPORTATION REQUIRES ASSISTANCE

## 2023-07-24 NOTE — PATIENT INSTRUCTIONS

## 2023-07-24 NOTE — PROGRESS NOTES
"SUBJECTIVE:   Tyler is a 73 year old who presents for Preventive Visit.      7/24/2023     3:20 PM   Additional Questions   Roomed by Ruma Davis     Are you in the first 12 months of your Medicare coverage?  No    Healthy Habits:     In general, how would you rate your overall health?  Good    Frequency of exercise:  1 day/week    Duration of exercise:  Less than 15 minutes    Do you usually eat at least 4 servings of fruit and vegetables a day, include whole grains    & fiber and avoid regularly eating high fat or \"junk\" foods?  No    Taking medications regularly:  Yes    Medication side effects:  None    Ability to successfully perform activities of daily living:  Transportation requires assistance    Home Safety:  Lack of grab bars in the bathroom    Hearing Impairment:  No hearing concerns    In the past 6 months, have you been bothered by leaking of urine?  No    In general, how would you rate your overall mental or emotional health?  Good    Additional concerns today:  Yes    Doing alright,     Glucose is up at 136, A1c  was 6.5.  says he is eating more sweets the last year.     Not exercising much, not cutting the grass as he is falling down more, maybe 6 times the last year. Age and his old stroke.     Some dizziness,     Gets sleepy at times, goes back to bed til 10:30.      TED and has bipap obstructive and central apnea. Just got new machine, apnea is down to 6 per hour.     Have you ever done Advance Care Planning? (For example, a Health Directive, POLST, or a discussion with a medical provider or your loved ones about your wishes): Yes, patient states has an Advance Care Planning document and will bring a copy to the clinic.    Fall risk  Fallen 2 or more times in the past year?: Yes  Any fall with injury in the past year?: Yes  Discussed falling.    Cognitive Screening   1) Repeat 3 items (Leader, Season, Table)    2) Clock draw: NORMAL  3) 3 item recall: Recalls 3 objects  Results: 3 items " recalled: COGNITIVE IMPAIRMENT LESS LIKELY    Mini-CogTM Copyright RODERICK Montalvo. Licensed by the author for use in [facility]; reprinted with permission (ginette@[facility]). All rights reserved.      Do you have sleep apnea, excessive snoring or daytime drowsiness? : yes  Bipap every night,   Reviewed and updated as needed this visit by clinical staff   Tobacco  Allergies  Meds              Reviewed and updated as needed this visit by Provider                 Social History     Tobacco Use     Smoking status: Former     Packs/day: 1.00     Years: 20.00     Pack years: 20.00     Types: Cigarettes     Quit date: 1985     Years since quittin.3     Smokeless tobacco: Never     Tobacco comments:     spouse smokes outside   Substance Use Topics     Alcohol use: Yes     Comment: Occasional         2023     2:45 PM   Alcohol Use   Prescreen: >3 drinks/day or >7 drinks/week? No     Do you have a current opioid prescription? No  Do you use any other controlled substances or medications that are not prescribed by a provider? None      Current providers sharing in care for this patient include:   Patient Care Team:  Orville Galeas MD as PCP - General  Orville Galeas MD as Assigned PCP  Zen Ramos MD as Assigned Musculoskeletal Provider    The following health maintenance items are reviewed in Epic and correct as of today:  Health Maintenance   Topic Date Due     ASTHMA ACTION PLAN  2016     MEDICARE ANNUAL WELLNESS VISIT  2022     COVID-19 Vaccine (6 - Moderna series) 2023     ANNUAL REVIEW OF HM ORDERS  2023     ZOSTER IMMUNIZATION (2 of 2) 2023     INFLUENZA VACCINE (1) 2023     ASTHMA CONTROL TEST  2024     LIPID  2024     MICROALBUMIN  2024     CREATININE  2024     FALL RISK ASSESSMENT  2024     DTAP/TDAP/TD IMMUNIZATION (2 - Td or Tdap) 2025     ADVANCE CARE PLANNING  2026     COLORECTAL CANCER SCREENING   "12/02/2026     HEPATITIS C SCREENING  Completed     PHQ-2 (once per calendar year)  Completed     Pneumococcal Vaccine: 65+ Years  Completed     AORTIC ANEURYSM SCREENING (SYSTEM ASSIGNED)  Completed     IPV IMMUNIZATION  Aged Out     MENINGITIS IMMUNIZATION  Aged Out     Labs reviewed in EPIC  Shots are up to [email or web address]     Review of Systems   Constitutional:  Negative for chills and fever.   HENT:  Negative for congestion, ear pain, hearing loss and sore throat.    Eyes:  Positive for visual disturbance. Negative for pain.   Respiratory:  Negative for cough and shortness of breath.    Cardiovascular:  Positive for peripheral edema. Negative for chest pain and palpitations.   Gastrointestinal:  Positive for diarrhea. Negative for abdominal pain, constipation, heartburn, hematochezia and nausea.   Genitourinary:  Positive for impotence. Negative for dysuria, frequency, genital sores, hematuria, penile discharge and urgency.   Musculoskeletal:  Positive for arthralgias, joint swelling and myalgias.   Skin:  Negative for rash.   Neurological:  Positive for dizziness and weakness. Negative for headaches and paresthesias.   Psychiatric/Behavioral:  Negative for mood changes. The patient is nervous/anxious.    Dandruff and uses Head and Shoulder, doesn't shower daily.     OBJECTIVE:   /66   Pulse 62   Temp 98.2  F (36.8  C) (Temporal)   Resp 16   Ht 1.797 m (5' 10.75\")   Wt 92.1 kg (203 lb)   SpO2 97%   BMI 28.51 kg/m   Estimated body mass index is 28.51 kg/m  as calculated from the following:    Height as of this encounter: 1.797 m (5' 10.75\").    Weight as of this encounter: 92.1 kg (203 lb).  Physical Exam  GENERAL: healthy, alert and no distress  EYES: Eyes grossly normal to inspection, PERRL and conjunctivae and sclerae normal  HENT: ear canals and TM's normal, nose and mouth without ulcers or lesions  NECK: no adenopathy, no asymmetry, masses, or scars and thyroid normal to palpation  RESP: lungs clear to " auscultation - no rales, rhonchi or wheezes  CV: regular rate and rhythm, normal S1 S2, no S3 or S4, no murmur, click or rub, no peripheral edema and peripheral pulses strong  ABDOMEN: soft, nontender, no hepatosplenomegaly, no masses and bowel sounds normal  MS: no gross musculoskeletal defects noted, no edema  slight swelling.   SKIN: no suspicious lesions or rashes  NEURO: Normal strength and tone, mentation intact and speech normal  PSYCH: mentation appears normal, affect normal/bright        ASSESSMENT / PLAN:       ICD-10-CM    1. Encounter for Medicare annual wellness exam  Z00.00       2. Type 2 diabetes mellitus without complication, without long-term current use of insulin (H)  E11.9 **Hemoglobin A1c FUTURE 3mo     TSH with free T4 reflex      3. Fatigue, unspecified type  R53.83 TSH with free T4 reflex      4. Hyperlipidemia LDL goal <130  E78.5 atorvastatin (LIPITOR) 40 MG tablet      5. Essential hypertension with goal blood pressure less than 130/80  I10 indapamide (LOZOL) 1.25 MG tablet      6. Anxiety  F41.9 PARoxetine (PAXIL-CR) 25 MG 24 hr tablet      7. Seborrhea capitis in adult  L21.0 triamcinolone (KENALOG) 0.1 % external cream      8. Screening for prostate cancer  Z12.5 PSA, screen        Medicare wellness check.  Patient is overall doing okay.  His memory is good, he has been following.  Combination of aging and his past stroke which was 20 years ago.  Recommended he work on exercise walking on a daily basis.  He can walk on his treadmill where he has good support.    Immunizations are up-to-date got a shingles vaccine we will get a flu shot in the fall.  Colonoscopy is up-to-date  We will check a PSA.    Other issues addressed diet controlled type 2 diabetes A1c was 6.5 he will work on exercise daily, weight loss less sugar does not want medications.    Anxiety continue his Paxil    Hyperlipidemia on atorvastatin.    Seborrheic-itis we will try him with some triamcinolone cream to help  "these areas around his ears.            Patient has been advised of split billing requirements and indicates understanding: Yes      COUNSELING:  Reviewed preventive health counseling, as reflected in patient instructions       Regular exercise       Healthy diet/nutrition       Prostate cancer screening      BMI:   Estimated body mass index is 28.51 kg/m  as calculated from the following:    Height as of this encounter: 1.797 m (5' 10.75\").    Weight as of this encounter: 92.1 kg (203 lb).         He reports that he quit smoking about 38 years ago. His smoking use included cigarettes. He has a 20.00 pack-year smoking history. He has never used smokeless tobacco.      Appropriate preventive services were discussed with this patient, including applicable screening as appropriate for cardiovascular disease, diabetes, osteopenia/osteoporosis, and glaucoma.  As appropriate for age/gender, discussed screening for colorectal cancer, prostate cancer, breast cancer, and cervical cancer. Checklist reviewing preventive services available has been given to the patient.    Reviewed patients plan of care and provided an AVS. The Basic Care Plan (routine screening as documented in Health Maintenance) for Dylon meets the Care Plan requirement. This Care Plan has been established and reviewed with the Patient.    Orville Galeas MD  [facility]    Identified Health Risks:  He is at risk for lack of exercise and has been provided with information to increase physical activity for the benefit of his well-being.  The patient was counseled and encouraged to consider modifying their diet and eating habits. He was provided with information on recommended healthy diet options.  The patient reports that he has difficulty with activities of daily living. I have asked that the patient make a follow up appointment in 53 weeks where this issue will be further evaluated and addressed.  "

## 2023-08-06 ENCOUNTER — MYC MEDICAL ADVICE (OUTPATIENT)
Dept: ORTHOPEDICS | Facility: OTHER | Age: 73
End: 2023-08-06
Payer: COMMERCIAL

## 2023-08-17 ENCOUNTER — OFFICE VISIT (OUTPATIENT)
Dept: ORTHOPEDICS | Facility: OTHER | Age: 73
End: 2023-08-17
Payer: COMMERCIAL

## 2023-08-17 VITALS
BODY MASS INDEX: 28.84 KG/M2 | WEIGHT: 206 LBS | HEART RATE: 60 BPM | SYSTOLIC BLOOD PRESSURE: 108 MMHG | RESPIRATION RATE: 18 BRPM | DIASTOLIC BLOOD PRESSURE: 67 MMHG | HEIGHT: 71 IN

## 2023-08-17 DIAGNOSIS — M17.11 PRIMARY OSTEOARTHRITIS OF RIGHT KNEE: Primary | ICD-10-CM

## 2023-08-17 PROCEDURE — 20610 DRAIN/INJ JOINT/BURSA W/O US: CPT | Mod: RT | Performed by: ORTHOPAEDIC SURGERY

## 2023-08-17 RX ORDER — METHYLPREDNISOLONE ACETATE 80 MG/ML
80 INJECTION, SUSPENSION INTRA-ARTICULAR; INTRALESIONAL; INTRAMUSCULAR; SOFT TISSUE
Status: SHIPPED | OUTPATIENT
Start: 2023-08-17

## 2023-08-17 RX ORDER — LIDOCAINE HYDROCHLORIDE 10 MG/ML
5 INJECTION, SOLUTION EPIDURAL; INFILTRATION; INTRACAUDAL; PERINEURAL
Status: SHIPPED | OUTPATIENT
Start: 2023-08-17

## 2023-08-17 RX ADMIN — LIDOCAINE HYDROCHLORIDE 5 ML: 10 INJECTION, SOLUTION EPIDURAL; INFILTRATION; INTRACAUDAL; PERINEURAL at 09:14

## 2023-08-17 RX ADMIN — METHYLPREDNISOLONE ACETATE 80 MG: 80 INJECTION, SUSPENSION INTRA-ARTICULAR; INTRALESIONAL; INTRAMUSCULAR; SOFT TISSUE at 09:14

## 2023-08-17 NOTE — PROGRESS NOTES
Large Joint Injection/Arthocentesis: R knee joint    Date/Time: 8/17/2023 9:14 AM    Performed by: Zen Ramos MD  Authorized by: Zen Ramos MD    Indications:  Pain  Needle Size:  22 G  Guidance: landmark guided    Location:  Knee      Medications:  80 mg methylPREDNISolone 80 MG/ML; 5 mL lidocaine (PF) 1 %  Outcome:  Tolerated well, no immediate complications  Procedure discussed: discussed risks, benefits, and alternatives    Consent Given by:  Patient  Timeout: timeout called immediately prior to procedure    Prep: patient was prepped and draped in usual sterile fashion

## 2023-08-17 NOTE — PROGRESS NOTES
Follow up right knee primary osteoarthritis.  Last injection 5/19/23.  Range of motion 0-120.    He  desires injection today of right knee(s).  Risks, benefits, potential complications and alternatives were discussed.   With the patient's consent, sterile prep was performed of right knee(s).  Right knee was injected with Depo Medrol 80 mg and lidocaine at anterolateral site.  Return to clinic as needed.

## 2023-08-17 NOTE — LETTER
8/17/2023         RE: Dylon Barragan  [address] [place] [address]        Dear Colleague,    Thank you for referring your patient, Dylon Barragan, to the [facility] [place]. Please see a copy of my visit note below.    Follow up right knee primary osteoarthritis.  Last injection 5/19/23.  Range of motion 0-120.    He  desires injection today of right knee(s).  Risks, benefits, potential complications and alternatives were discussed.   With the patient's consent, sterile prep was performed of right knee(s).  Right knee was injected with Depo Medrol 80 mg and lidocaine at anterolateral site.  Return to clinic as needed.        Large Joint Injection/Arthocentesis: R knee joint    Date/Time: 8/17/2023 9:14 AM    Performed by: Zen Ramos MD  Authorized by: Zen Ramos MD    Indications:  Pain  Needle Size:  22 G  Guidance: landmark guided    Location:  Knee      Medications:  80 mg methylPREDNISolone 80 MG/ML; 5 mL lidocaine (PF) 1 %  Outcome:  Tolerated well, no immediate complications  Procedure discussed: discussed risks, benefits, and alternatives    Consent Given by:  Patient  Timeout: timeout called immediately prior to procedure    Prep: patient was prepped and draped in usual sterile fashion        Again, thank you for allowing me to participate in the care of your patient.        Sincerely,        Zen Ramos MD

## 2023-09-28 ENCOUNTER — TRANSFERRED RECORDS (OUTPATIENT)
Dept: HEALTH INFORMATION MANAGEMENT | Facility: CLINIC | Age: 73
End: 2023-09-28
Payer: COMMERCIAL

## 2023-10-20 ENCOUNTER — LAB (OUTPATIENT)
Dept: LAB | Facility: OTHER | Age: 73
End: 2023-10-20
Payer: COMMERCIAL

## 2023-10-20 DIAGNOSIS — E11.9 TYPE 2 DIABETES MELLITUS WITHOUT COMPLICATION, WITHOUT LONG-TERM CURRENT USE OF INSULIN (H): ICD-10-CM

## 2023-10-20 LAB — HBA1C MFR BLD: 5.4 % (ref 0–5.6)

## 2023-10-20 PROCEDURE — 83036 HEMOGLOBIN GLYCOSYLATED A1C: CPT

## 2023-10-20 PROCEDURE — 36415 COLL VENOUS BLD VENIPUNCTURE: CPT

## 2023-11-30 NOTE — TELEPHONE ENCOUNTER
Name: Justen Kapadia      : 1954      MRN: [id number]  Encounter Provider: GABRIELE Wang  Encounter Date: 2023   Encounter department: [facility]     1. Upper respiratory tract infection, unspecified type  Assessment & Plan:  Likely viral, pt advised to continue to monitor symptoms and start flonase to help with nasal congestion/swelling. If symptoms worsen or persist she is advised to call back Monday for instructions. 2. Acute low back pain without sciatica, unspecified back pain laterality  Assessment & Plan:  Gradually improving with current management. Continue home meds, heat, movement. Discussed referral to PT or chiropractor. She says she would prefer PT but will give it a few days to see how she progress. Orders:  -     Ambulatory Referral to Physical Therapy; Future           Subjective      Pt is a 78 y.o. y/o female who is seen today for evaluation of hoarseness, congestion. Pt had covid 10/3 and she recovered from that. She started with a head cold with sinus congestion and clear/yellow nasal drainage, a "hard cough" onset on Saturday. No fever or chills. She has hoarseness but she denies sore throat. No sob, headache. No ear pain. No change to appetite. She denies n/v/d. She has taken nyquil once. She also notes that she threw her back out on Saturday. She says she was bending over to pick something up when the pain started. No radiation to the groin or legs. No new onset bowel or bladder incontinence. She took flexeril that she had at home and has been using heat, she thinks these interventions are helping. She uses percocet twice a day for her arthritis and is using ibuprofen 400 mg bid. She says those medications do help. Review of Systems   Constitutional:  Negative for appetite change, chills, fatigue and fever. HENT:  Positive for congestion, rhinorrhea, sinus pressure and voice change.  Negative Provider E-Visit time total (minutes):     Spoke with wife.  They had known exposure to strep and he has no symptoms, but would like to rule out strep before they visit family.  He has a 145 lab only today.    Please order strep.    PETRA BensonN, RN, PHN  [facility] ~ Registered Nurse  Clinic Triage ~ [place]  April 7, 2023     for ear pain, postnasal drip, sinus pain and sore throat. Respiratory:  Positive for cough. Negative for chest tightness, shortness of breath and wheezing. Cardiovascular:  Negative for chest pain, palpitations and leg swelling. Gastrointestinal:  Negative for diarrhea, nausea and vomiting. Musculoskeletal:  Positive for back pain. Negative for myalgias. Neurological:  Negative for dizziness and headaches. Hematological:  Negative for adenopathy. Psychiatric/Behavioral:  Negative for sleep disturbance. Current Outpatient Medications on File Prior to Visit   Medication Sig    albuterol (2.5 mg/3 mL) 0.083 % nebulizer solution Take 3 mL (2.5 mg total) by nebulization every 6 (six) hours as needed for wheezing or shortness of breath    albuterol (Ventolin HFA) 90 mcg/act inhaler Inhale 2 puffs every 6 (six) hours as needed for wheezing or shortness of breath    amLODIPine (NORVASC) 10 mg tablet take 1 tablet by mouth once daily    budesonide-formoterol (Symbicort) 160-4.5 mcg/act inhaler Inhale 2 puffs 2 (two) times a day Rinse mouth after use.     ergocalciferol (VITAMIN D2) 50,000 units take 1 capsule by mouth every week    escitalopram (LEXAPRO) 10 mg tablet take 1/2 tablet by mouth once daily    hydrochlorothiazide (HYDRODIURIL) 25 mg tablet take 1 tablet by mouth once daily    leflunomide (ARAVA) 10 MG tablet Take 10 mg by mouth daily    losartan (COZAAR) 50 mg tablet take 1 tablet once daily as directed    metFORMIN (GLUCOPHAGE-XR) 500 mg 24 hr tablet take 1 tablet by mouth three times a day (Patient taking differently: 500 mg 2 (two) times a day)    metoprolol succinate (TOPROL-XL) 100 mg 24 hr tablet take 1 tablet by mouth once daily    Multiple Vitamin (MULTIVITAMIN ADULT PO) multivitamin    ondansetron (ZOFRAN) 4 mg tablet take 1 tablet by mouth every 6 hours AS NEEDED FOR NAUSE OR VOMITING    oxyCODONE-acetaminophen (PERCOCET) 5-325 mg per tablet Take 1 tablet by mouth every 8 (eight) hours as needed for moderate pain    simvastatin (ZOCOR) 10 mg tablet take 1 tablet by mouth once daily    ALPRAZolam (XANAX) 0.5 mg tablet Take 0.5 tablets (0.25 mg total) by mouth daily (Patient not taking: Reported on 10/4/2023)       Objective     /61 (BP Location: Left arm)   Pulse 71   Temp (!) 97.3 °F (36.3 °C) (Tympanic)   Resp 16   Ht 5' 2" (1.575 m)   Wt 82.6 kg (182 lb)   SpO2 98%   BMI 33.29 kg/m²     Physical Exam  Vitals reviewed. Constitutional:       General: She is awake. She is not in acute distress. Appearance: Normal appearance. She is well-developed and well-groomed. She is not ill-appearing. HENT:      Head: Normocephalic. Right Ear: Hearing, ear canal and external ear normal. No middle ear effusion. Tympanic membrane is bulging. Left Ear: Hearing, ear canal and external ear normal.  No middle ear effusion. Tympanic membrane is bulging. Nose: Mucosal edema and congestion present. No rhinorrhea. Mouth/Throat:      Lips: Pink. Mouth: Mucous membranes are moist. Mucous membranes are not dry. Pharynx: Oropharynx is clear. No oropharyngeal exudate or posterior oropharyngeal erythema. Tonsils: No tonsillar abscesses. Eyes:      Conjunctiva/sclera: Conjunctivae normal.   Cardiovascular:      Rate and Rhythm: Normal rate and regular rhythm. Heart sounds: Normal heart sounds. No murmur heard. Pulmonary:      Effort: Pulmonary effort is normal. No accessory muscle usage or respiratory distress. Breath sounds: Normal breath sounds. No decreased breath sounds, wheezing, rhonchi or rales. Musculoskeletal:      Lumbar back: No tenderness or bony tenderness. Normal range of motion. Lymphadenopathy:      Head:      Right side of head: No submental, submandibular, tonsillar, preauricular, posterior auricular or occipital adenopathy.       Left side of head: No submental, submandibular, tonsillar, preauricular, posterior auricular or occipital adenopathy. Cervical: No cervical adenopathy. Skin:     General: Skin is warm and dry. Neurological:      Mental Status: She is alert and oriented to person, place, and time. Motor: Motor function is intact. No weakness. Psychiatric:         Attention and Perception: Attention normal.         Mood and Affect: Mood normal.         Speech: Speech normal.         Behavior: Behavior normal. Behavior is cooperative. Thought Content:  Thought content normal.         Cognition and Memory: Cognition normal.         Judgment: Judgment normal.       GABRIELE Rodriguez

## 2024-03-16 ENCOUNTER — HEALTH MAINTENANCE LETTER (OUTPATIENT)
Age: 74
End: 2024-03-16

## 2024-03-29 ENCOUNTER — LAB (OUTPATIENT)
Dept: LAB | Facility: OTHER | Age: 74
End: 2024-03-29
Payer: COMMERCIAL

## 2024-03-29 DIAGNOSIS — E11.9 DIABETES MELLITUS, TYPE 2 (H): Primary | ICD-10-CM

## 2024-03-29 LAB — HBA1C MFR BLD: 6.3 % (ref 0–5.6)

## 2024-03-29 PROCEDURE — 83036 HEMOGLOBIN GLYCOSYLATED A1C: CPT

## 2024-03-29 PROCEDURE — 36415 COLL VENOUS BLD VENIPUNCTURE: CPT

## 2024-05-15 ENCOUNTER — TRANSFERRED RECORDS (OUTPATIENT)
Dept: HEALTH INFORMATION MANAGEMENT | Facility: CLINIC | Age: 74
End: 2024-05-15
Payer: COMMERCIAL

## 2024-05-17 ENCOUNTER — TRANSCRIBE ORDERS (OUTPATIENT)
Dept: OTHER | Age: 74
End: 2024-05-17

## 2024-05-17 DIAGNOSIS — R29.6 RECURRENT FALLS: Primary | ICD-10-CM

## 2024-05-23 ENCOUNTER — THERAPY VISIT (OUTPATIENT)
Dept: PHYSICAL THERAPY | Facility: CLINIC | Age: 74
End: 2024-05-23
Attending: PHYSICIAN ASSISTANT
Payer: COMMERCIAL

## 2024-05-23 DIAGNOSIS — R29.6 RECURRENT FALLS: Primary | ICD-10-CM

## 2024-05-23 PROCEDURE — 97110 THERAPEUTIC EXERCISES: CPT | Mod: GP | Performed by: PHYSICAL THERAPIST

## 2024-05-23 PROCEDURE — 97161 PT EVAL LOW COMPLEX 20 MIN: CPT | Mod: GP | Performed by: PHYSICAL THERAPIST

## 2024-05-23 NOTE — PROGRESS NOTES
PHYSICAL THERAPY EVALUATION  Type of Visit: Evaluation    See electronic medical record for Abuse and Falls Screening details.    Subjective       Presenting condition or subjective complaint: Balance issues  Pt is a 73 yo male who presents to PT with c/o balance issues, weakness, and general stiffness throughout his body. Pt has experienced multiple falls throughout this past year, no major injuries reported. Pt to have formal assessment at [facility] on June 5 to assess extent of his neuropathy.  Date of onset: 05/17/24    Relevant medical history: Asthma; Bladder or bowel problems; Depression; Diabetes; Dizziness; High blood pressure; Numbness or tingling in perianal area; Overweight; Progressive neurological deficits; Severe dizziness; Significant weakness; Sleep disorder like apnea; Stroke; Vision problems   Dates & types of surgery: Tonsils, appendix, hernia, eye lids, cataract. See MyChart for dates.    Prior diagnostic imaging/testing results: MRI; CT scan; Video fluoroscopic swallow study     Prior therapy history for the same diagnosis, illness or injury: Yes Balance therapy in 2022 but didn t complete due to a knee injury.    Prior Level of Function  Transfers:   Ambulation:   ADL:   IADL:     Living Environment  Social support: With a significant other or spouse   Type of home: House; 2-story; Basement   Stairs to enter the home: Yes 2 Is there a railing: No   Ramp: No   Stairs inside the home: Yes 13 Is there a railing: Yes   Help at home: Home and Yard maintenance tasks; Assist for driving and community activities  Equipment owned: Straight Cane; Walker with wheels; Bath bench; walking pole    Employment: No    Hobbies/Interests: Watching sports, play games, playing golf, casino, road trips, spending time with friends and family especially 4 year old granddaughter.    Patient goals for therapy: Gain flexibility and balance. More stability with walking. Eliminate falls. More stability  bending over and getting up and lifting. Swing a golf club.    Pain assessment:      Objective      Cognitive Status Examination  Orientation:    Level of Consciousness:   Follows Commands and Answers Questions:   Personal Safety and Judgement:   Memory:     OBSERVATION:   INTEGUMENTARY:   POSTURE:   PALPATION:   RANGE OF MOTION:   STRENGTH:     BED MOBILITY:     TRANSFERS:     WHEELCHAIR MOBILITY:     GAIT:   Level of [place]: Independent  Assistive Device(s): Cane (single end)  Gait Deviations: Base of support increased  Pt with lateral path deviations, weakness in B hips resulting in trendelenberg  Gait Distance: Not formally assessed  Stairs: 10 with no rails    BALANCE:  Impaired, see FGA score. Pt with weakness in B hips as well as neuropathy in his feet impacting his ability to perform SLS without UE support.    SPECIAL TESTS  Functional Gait Assessment (FGA) TOTAL SCORE: (MAXIMUM SCORE 30): 16    10 Meter Walk Test (Comfortable)     10 Meter Walk Test (Fast)     6 Minute Walk Test (6MWT)           Carter Balance Scale (BBS)     5 Times Sit-to-Stand (5TSTS)  19.2 seconds without UE support     Dynamic Gait Index (DGI)     Timed Up and Go (TUG) - sec    Single Leg Stance Right (sec)    Single Leg Stance Left (sec)    Modified CTSIB Conditions (sec) Cond 1:   Cond 2:   Cond 4:   Cond 5 :    Romberg  (sec)    Sharpened Romberg (sec)    30 Second Sit to Stand (reps/height)    Mini-BESTest              SENSATION:     REFLEXES:   COORDINATION:   MUSCLE TONE:         Assessment & Plan   CLINICAL IMPRESSIONS  Medical Diagnosis: Recurrent falls (R29.6)    Treatment Diagnosis: Repeated falls, impaired balance and mobility   Impression/Assessment:  Pt presents to PT with significant functional strength deficits in his hips and LE B, impaired balance putting him at high risk for falls and injury which may result in higher needs of assistance and care. Pt would benefit from skilled PT interventions in order to address  his weakness and balance impairments in order to reduce his falls risk and improve his safety, independence, and overall QOL.    Clinical Decision Making (Complexity):  Clinical Presentation: Stable/Uncomplicated  Clinical Presentation Rationale: based on medical and personal factors listed in PT evaluation  Clinical Decision Making (Complexity): Low complexity    PLAN OF CARE  Treatment Interventions:  Interventions: Gait Training, Neuromuscular Re-education, Therapeutic Activity, Therapeutic Exercise    Long Term Goals     PT Goal 1  Goal Identifier: HEP  Goal Description: Pt will demo independence in performance and progression of strengthening and balance HEP in order to improve CLOF.  Target Date:  (Ongoing, updates as needed)  PT Goal 2  Goal Identifier: FGA  Goal Description: Pt will demo improved balance and mobility as shown by increased FGA score of at least 4 points in order to show significant improvement and reduced falls risk.  Goal Progress: 16/30 (eval)  Target Date: 07/18/24  PT Goal 3  Goal Identifier: Golf  Goal Description: Pt will demo improved functional dynamic balance as shown by his ability to swing golf club through partial swing length in order to effectively participate in chipping and putting with his sons.  Goal Progress: Pt not comfortable swinging a club at this time (eval)  Target Date: 07/18/24      Frequency of Treatment: 1x/week  Duration of Treatment: 8 weeks    Recommended Referrals to Other Professionals:   Education Assessment:   Learner/Method: Patient;Significant Other;Listening;Demonstration  Education Comments: Sensory inputs for balance, strength outputs for balance; natural aging and loss of strength    Risks and benefits of evaluation/treatment have been explained.   Patient/Family/caregiver agrees with Plan of Care.     Evaluation Time:     PT Dinesh, Low Complexity Minutes (68012): 15       Signing Clinician: Mack Murray PT      [facility]  Services                                                                                   OUTPATIENT PHYSICAL THERAPY      PLAN OF TREATMENT FOR OUTPATIENT REHABILITATION   Patient's Last Name, First Name, Dylon Pollack YOB: 1950   Provider's Name   [facility]   Medical Record No.  [id number]     Onset Date: 05/17/24  Start of Care Date: 05/23/24     Medical Diagnosis:  Recurrent falls (R29.6)      PT Treatment Diagnosis:  Repeated falls, impaired balance and mobility Plan of Treatment  Frequency/Duration: 1x/week/ 8 weeks    Certification date from 05/23/24 to 07/18/24         See note for plan of treatment details and functional goals     Mack Murray, DEWAYNE                         I CERTIFY THE NEED FOR THESE SERVICES FURNISHED UNDER        THIS PLAN OF TREATMENT AND WHILE UNDER MY CARE .             Physician Signature               Date    X_____________________________________________________                  Referring Provider:  Aliya Zaldivar NP    Initial Assessment  See Epic Evaluation- Start of Care Date: 05/23/24

## 2024-05-30 ENCOUNTER — THERAPY VISIT (OUTPATIENT)
Dept: PHYSICAL THERAPY | Facility: CLINIC | Age: 74
End: 2024-05-30
Attending: PHYSICIAN ASSISTANT
Payer: COMMERCIAL

## 2024-05-30 DIAGNOSIS — R29.6 RECURRENT FALLS: Primary | ICD-10-CM

## 2024-05-30 PROCEDURE — 97530 THERAPEUTIC ACTIVITIES: CPT | Mod: GP | Performed by: PHYSICAL THERAPIST

## 2024-06-06 ENCOUNTER — THERAPY VISIT (OUTPATIENT)
Dept: PHYSICAL THERAPY | Facility: CLINIC | Age: 74
End: 2024-06-06
Attending: PHYSICIAN ASSISTANT
Payer: COMMERCIAL

## 2024-06-06 DIAGNOSIS — R29.6 RECURRENT FALLS: Primary | ICD-10-CM

## 2024-06-06 PROCEDURE — 97530 THERAPEUTIC ACTIVITIES: CPT | Mod: GP | Performed by: PHYSICAL THERAPIST

## 2024-06-17 ENCOUNTER — THERAPY VISIT (OUTPATIENT)
Dept: PHYSICAL THERAPY | Facility: CLINIC | Age: 74
End: 2024-06-17
Attending: PHYSICIAN ASSISTANT
Payer: COMMERCIAL

## 2024-06-17 DIAGNOSIS — R29.6 RECURRENT FALLS: Primary | ICD-10-CM

## 2024-06-17 PROCEDURE — 97110 THERAPEUTIC EXERCISES: CPT | Mod: GP | Performed by: PHYSICAL THERAPIST

## 2024-06-17 PROCEDURE — 97750 PHYSICAL PERFORMANCE TEST: CPT | Mod: GP | Performed by: PHYSICAL THERAPIST

## 2024-06-27 ENCOUNTER — THERAPY VISIT (OUTPATIENT)
Dept: PHYSICAL THERAPY | Facility: CLINIC | Age: 74
End: 2024-06-27
Attending: PHYSICIAN ASSISTANT
Payer: COMMERCIAL

## 2024-06-27 DIAGNOSIS — R29.6 RECURRENT FALLS: Primary | ICD-10-CM

## 2024-06-27 PROCEDURE — 97110 THERAPEUTIC EXERCISES: CPT | Mod: GP | Performed by: PHYSICAL THERAPIST

## 2024-06-27 PROCEDURE — 97530 THERAPEUTIC ACTIVITIES: CPT | Mod: GP | Performed by: PHYSICAL THERAPIST

## 2024-07-11 ENCOUNTER — THERAPY VISIT (OUTPATIENT)
Dept: PHYSICAL THERAPY | Facility: CLINIC | Age: 74
End: 2024-07-11
Attending: PHYSICIAN ASSISTANT
Payer: COMMERCIAL

## 2024-07-11 DIAGNOSIS — R29.6 RECURRENT FALLS: Primary | ICD-10-CM

## 2024-07-11 PROCEDURE — 97110 THERAPEUTIC EXERCISES: CPT | Mod: GP | Performed by: PHYSICAL THERAPIST

## 2024-07-11 PROCEDURE — 97112 NEUROMUSCULAR REEDUCATION: CPT | Mod: GP | Performed by: PHYSICAL THERAPIST

## 2024-07-12 NOTE — PROGRESS NOTES
[facility]                                                                                   OUTPATIENT PHYSICAL THERAPY    PLAN OF TREATMENT FOR OUTPATIENT REHABILITATION   Patient's Last Name, First Name, Dylon Pollack YOB: 1950   Provider's Name   [facility]   Medical Record No.  [id number]     Onset Date: 05/17/24  Start of Care Date: 05/23/24     Medical Diagnosis:  Recurrent falls (R29.6)      PT Treatment Diagnosis:  Repeated falls, impaired balance and mobility Plan of Treatment  Frequency/Duration: 4 sessions every other week/ 8 weeks    Certification date from 07/11/24 to 09/05/24         See note for plan of treatment details and functional goals     Delphine Fields, PT                         I CERTIFY THE NEED FOR THESE SERVICES FURNISHED UNDER        THIS PLAN OF TREATMENT AND WHILE UNDER MY CARE .             Physician Signature               Date    X_____________________________________________________                  Referring Provider:  Aliya AGUERO CNP    Initial Assessment  See Epic Evaluation- Start of Care Date: 05/23/24 07/11/24 0500   Appointment Info   Signing clinician's name / credentials Delphine Fields, PT LAT FPS   Total/Authorized Visits 6/10   Visits Used 6   Medical Diagnosis Recurrent falls (R29.6)   PT Tx Diagnosis Repeated falls, impaired balance and mobility   Quick Adds Certification   Progress Note/Certification   Start of Care Date 05/23/24   Onset of illness/injury or Date of Surgery 05/17/24   Therapy Frequency 4 sessions every other week   Predicted Duration 8 weeks   Certification date from 07/11/24   Certification date to 09/05/24   Progress Note Due Date 09/05/24   Progress Note Completed Date 07/11/24       Present No   GOALS   PT Goals 2;3   PT Goal 1   Goal Identifier HEP   Goal Description Pt will demo independence in performance and  progression of strengthening and balance HEP in order to improve CLOF.   Goal Progress Completing home program,   Target Date   (Ongoing, updates as needed)   PT Goal 2   Goal Identifier FGA   Goal Description Pt will demo improved balance and mobility as shown by increased FGA score of at least 4 points in order to show significant improvement and reduced falls risk.   Goal Progress 20/30   Target Date 07/18/24   PT Goal 3   Goal Identifier Golf   Goal Description Pt will demo improved functional dynamic balance as shown by his ability to swing golf club through partial swing length in order to effectively participate in chipping and putting with his sons.   Goal Progress Able to putt but not drive or chip over weekend. Walked golf course without cane or walking stick.   Target Date 07/18/24   Subjective Report   Subjective Report Near falls in home and uses the walking stick outside - bending over to weed, reacher to  sticks. Has abrasion/wound with inflammation not sure why and wife feels it is improving. Not sure how it happened as he has less feeling in the lower legs and feet. Neck symptoms with exercises, thinks due to eye issues eyes. Wife feels he is improving. Treadmill up to 15 minutes. Learning when he needs to stop, balance better but varies and less frequent off balance.  Continues to have issues moving legs at times.  not dizzy as often getting out of recliner. wife and grand daughter present for session - mat unplugged for safety per protocol   Objective Measures   Objective Measures Objective Measure 2;Objective Measure 3   Objective Measure 1   Objective Measure FGA   Details 20/30   Objective Measure 2   Objective Measure antigravity MRROM (L/R)   Details hip flexion: 3/5 bilaterally, Hip abduction L;3/5 bilaterally, Hip ADDuctors: 4/5, 4-/5; Extension: bilaterally 3/5   Objective Measure 3   Objective Measure Tandem stand:   Details R: 17 secinds, L: 21 seconds   Treatment Interventions  (PT)   Interventions Therapeutic Procedure/Exercise;Neuromuscular Re-education   Therapeutic Procedure/Exercise   Therapeutic Procedures: strength, endurance, ROM, flexibility minutes (99289) 16   Ther Proc 1 - Details education: proper technique for standing hip abduction with cues to lead with heel and reach leg back and out to side. Added:Exercise Name: Hip Flexion Straight Leg Raise, Sets: 1 - Reps: 10 with 3-5 second hold - Sessions: 3-5 ddays per week, Notes: Both legs   Skilled Intervention instruction, demonstration, activities and education to improve strength   Patient Response/Progress able to complete hip abduction with PT guiding leg->independent.   Neuromuscular Re-education   Neuromuscular re-ed of mvmt, balance, coord, kinesthetic sense, posture, proprioception minutes (31690) 23   Neuro Re-ed 1 - Details Education: monitor lower leg wound for infection and reviewed the signs. Exercise Name: Tandem Stance, Sets: 1-2 - Reps: 1 time GOAL: 20+ seconds - Sessions: 2-3 times per day, Notes: FOCUS knees slightly bent equal weight in legs - consider feeling from your knees vs feet Find target to look at. Time taken to teach pt to balance legs - bend knees, equal weight through feet and find target to assist with balance - with and without use of mirror. consider focusing on your knees if you cannot feel your feet   Skilled Intervention instruction, demonstration, education for improved balance   Patient Response/Progress able to increase time with tandem from 2-10 seconds to above times.   Education   Learner/Method Patient;Significant Other;Listening;Reading;Demonstration;Pictures/Video;No Barriers to Learning   Education Comments how to balance legs, plan of care, supine SLR into hip flexion   Plan   Home program head turns with hip abduction and extension, walk and complete head turns with someone for safety, consider total gym 20-30 minutes x 3-5 days per week   Updates to plan of care decrease to 1  time every other week   Plan for next session spouse will take picture of total gym to learn what he can do at home for exercise, check tandem, may be ready to try SLS again, walking eyes closed. laterality for legs.   Comments   Comments Tyler reports decreased frequency of falls and near falls. He is more aware of when he needs a break time. His wife has been noting improvement as well. WOund is improving on L lower leg. Understood signs of infection. He had an improvement in FGA to 20/30 on  6-. He is working into higherlevel activities and is indep in current home program so plan to decrease to every other week x 3-4 more sessions to continue to advance his activities   Total Session Time   Timed Code Treatment Minutes 39   Total Treatment Time (sum of timed and untimed services) 39

## 2024-07-18 ENCOUNTER — THERAPY VISIT (OUTPATIENT)
Dept: PHYSICAL THERAPY | Facility: CLINIC | Age: 74
End: 2024-07-18
Attending: PHYSICIAN ASSISTANT
Payer: COMMERCIAL

## 2024-07-18 DIAGNOSIS — R29.6 RECURRENT FALLS: Primary | ICD-10-CM

## 2024-07-18 PROCEDURE — 97110 THERAPEUTIC EXERCISES: CPT | Mod: GP | Performed by: PHYSICAL THERAPIST

## 2024-07-26 DIAGNOSIS — F41.9 ANXIETY: ICD-10-CM

## 2024-07-29 RX ORDER — PAROXETINE HYDROCHLORIDE HEMIHYDRATE 25 MG/1
25 TABLET, FILM COATED, EXTENDED RELEASE ORAL EVERY MORNING
Qty: 90 TABLET | Refills: 4 | Status: SHIPPED | OUTPATIENT
Start: 2024-07-29

## 2024-07-29 SDOH — HEALTH STABILITY: PHYSICAL HEALTH: ON AVERAGE, HOW MANY DAYS PER WEEK DO YOU ENGAGE IN MODERATE TO STRENUOUS EXERCISE (LIKE A BRISK WALK)?: 0 DAYS

## 2024-07-29 SDOH — HEALTH STABILITY: PHYSICAL HEALTH: ON AVERAGE, HOW MANY MINUTES DO YOU ENGAGE IN EXERCISE AT THIS LEVEL?: 30 MIN

## 2024-07-29 ASSESSMENT — SOCIAL DETERMINANTS OF HEALTH (SDOH): HOW OFTEN DO YOU GET TOGETHER WITH FRIENDS OR RELATIVES?: TWICE A WEEK

## 2024-08-02 ENCOUNTER — OFFICE VISIT (OUTPATIENT)
Dept: INTERNAL MEDICINE | Facility: CLINIC | Age: 74
End: 2024-08-02
Attending: INTERNAL MEDICINE
Payer: COMMERCIAL

## 2024-08-02 VITALS
RESPIRATION RATE: 16 BRPM | SYSTOLIC BLOOD PRESSURE: 114 MMHG | TEMPERATURE: 97.3 F | WEIGHT: 199.1 LBS | OXYGEN SATURATION: 97 % | HEIGHT: 70 IN | BODY MASS INDEX: 28.5 KG/M2 | HEART RATE: 64 BPM | DIASTOLIC BLOOD PRESSURE: 70 MMHG

## 2024-08-02 DIAGNOSIS — E78.5 HYPERLIPIDEMIA LDL GOAL <130: ICD-10-CM

## 2024-08-02 DIAGNOSIS — Z12.5 SCREENING FOR PROSTATE CANCER: ICD-10-CM

## 2024-08-02 DIAGNOSIS — E11.9 TYPE 2 DIABETES MELLITUS WITHOUT COMPLICATION, WITHOUT LONG-TERM CURRENT USE OF INSULIN (H): ICD-10-CM

## 2024-08-02 DIAGNOSIS — R25.1 TREMOR: ICD-10-CM

## 2024-08-02 DIAGNOSIS — Z00.00 ENCOUNTER FOR MEDICARE ANNUAL WELLNESS EXAM: Primary | ICD-10-CM

## 2024-08-02 DIAGNOSIS — R29.6 RECURRENT FALLS: ICD-10-CM

## 2024-08-02 DIAGNOSIS — I10 ESSENTIAL HYPERTENSION WITH GOAL BLOOD PRESSURE LESS THAN 130/80: ICD-10-CM

## 2024-08-02 PROCEDURE — G0439 PPPS, SUBSEQ VISIT: HCPCS | Performed by: INTERNAL MEDICINE

## 2024-08-02 PROCEDURE — 99213 OFFICE O/P EST LOW 20 MIN: CPT | Mod: 25 | Performed by: INTERNAL MEDICINE

## 2024-08-02 RX ORDER — PROPRANOLOL HCL 60 MG
120 CAPSULE, EXTENDED RELEASE 24HR ORAL DAILY
COMMUNITY
Start: 2024-08-02

## 2024-08-02 RX ORDER — ATORVASTATIN CALCIUM 40 MG/1
40 TABLET, FILM COATED ORAL DAILY
Qty: 90 TABLET | Refills: 3 | Status: SHIPPED | OUTPATIENT
Start: 2024-08-02

## 2024-08-02 RX ORDER — INDAPAMIDE 1.25 MG/1
1.25 TABLET ORAL EVERY MORNING
Qty: 90 TABLET | Refills: 3 | Status: SHIPPED | OUTPATIENT
Start: 2024-08-02

## 2024-08-02 ASSESSMENT — PAIN SCALES - GENERAL: PAINLEVEL: MODERATE PAIN (5)

## 2024-08-02 NOTE — PROGRESS NOTES
"Preventive Care Visit  [facility]  Orville Galeas MD, Internal Medicine  Aug 2, 2024      Assessment & Plan   Problem List Items Addressed This Visit       Essential hypertension with goal blood pressure less than 130/80 (Chronic)    Relevant Medications    indapamide (LOZOL) 1.25 MG tablet    Other Relevant Orders    Comprehensive metabolic panel (BMP + Alb, Alk Phos, ALT, AST, Total. Bili, TP)    Lipid panel reflex to direct LDL Fasting    HYPERLIPIDEMIA LDL GOAL <130    Relevant Medications    atorvastatin (LIPITOR) 40 MG tablet    Diabetes mellitus, type 2 (H)    Relevant Orders    HEMOGLOBIN A1C    Albumin Random Urine Quantitative with Creat Ratio    Comprehensive metabolic panel (BMP + Alb, Alk Phos, ALT, AST, Total. Bili, TP)    Recurrent falls     Other Visit Diagnoses       Encounter for Medicare annual wellness exam    -  Primary    Screening for prostate cancer        Relevant Orders    PSA, screen    Tremor        Relevant Medications    propranolol ER (INDERAL LA) 60 MG 24 hr capsule           Medicare wellness exam.  Patient is overall doing okay he does have a lot of falling he is worked with neurology and PT Uses a cane or walking stick.  Colonoscopy is good till 2026  Tetanus is due in 25  Recommended a flu shot and COVID shot this fall.    Other issues addressed tremors he is on propranolol seen neurology.  Type 2 diabetes is new we are following his A1c but no medications.  Hypertension continue his meds blood pressure is controlled.  Will do future labs when he is fasting in [place].          Patient has been advised of split billing requirements and indicates understanding: Yes       BMI  Estimated body mass index is 28.36 kg/m  as calculated from the following:    Height as of this encounter: 1.784 m (5' 10.25\").    Weight as of this encounter: 90.3 kg (199 lb 1.6 oz).   Weight management plan: Discussed healthy diet and exercise " guidelines    Counseling  Appropriate preventive services were addressed with this patient via screening, questionnaire, or discussion as appropriate for fall prevention, nutrition, physical activity, Tobacco-use cessation, weight loss and cognition.  Checklist reviewing preventive services available has been given to the patient.  Reviewed patient's diet, addressing concerns and/or questions.   He is at risk for psychosocial distress and has been provided with information to reduce risk.     FUTURE APPOINTMENTS:       - Follow-up for annual visit or as needed      Subjective   Tyler is a 74 year old, presenting for the following:  Wellness Visit        8/2/2024    11:19 AM   Additional Questions   Roomed by Carito GANN         8/2/2024    11:19 AM   Patient Reported Additional Medications   Patient reports taking the following new medications Propranolol er 60 mg 2 capsules in the morning         Health Care Directive  Patient does not have a Health Care Directive or Living Will: Patient states has Advance Directive and will bring in a copy to clinic.    HPI    Balance is an issue, falling down more, gave up golf, yardwork, fell down last year and needed help getting up.  Uses a cane. Working with PT for the falling.     Seeing neurology, tremor is worse, stroke in 1999. Residual stroke symptoms is the thought.     Watches grand daughter one day week.     Still able to drive.     BIPAP now for mayo. Uses it nightly.     Weight is down 5 pounds.         7/29/2024   General Health   How would you rate your overall physical health? (!) FAIR   Feel stress (tense, anxious, or unable to sleep) Only a little      (!) STRESS CONCERN      7/29/2024   Nutrition   Diet: Regular (no restrictions)            7/29/2024   Exercise   Days per week of moderate/strenous exercise 0 days   Average minutes spent exercising at this level 30 min      (!) EXERCISE CONCERN      7/29/2024   Social Factors   Frequency of gathering with friends  or relatives Twice a week   Worry food won't last until get money to buy more No   Food not last or not have enough money for food? No   Do you have housing? (Housing is defined as stable permanent housing and does not include staying ouside in a car, in a tent, in an abandoned building, in an overnight shelter, or couch-surfing.) Yes   Are you worried about losing your housing? No   Lack of transportation? No   Unable to get utilities (heat,electricity)? No            8/2/2024   Fall Risk   Gait Speed Test (Document in seconds) 4   Gait Speed Test Interpretation Less than or equal to 5.00 seconds - PASS             7/29/2024   Activities of Daily Living- Home Safety   Needs help with the following daily activites None of the above   Safety concerns in the home None of the above            7/29/2024   Dental   Dentist two times every year? Yes            7/29/2024   Hearing Screening   Hearing concerns? None of the above            7/29/2024   Driving Risk Screening   Patient/family members have concerns about driving (!) DECLINE            7/29/2024   General Alertness/Fatigue Screening   Have you been more tired than usual lately? No            7/29/2024   Urinary Incontinence Screening   Bothered by leaking urine in past 6 months No            7/29/2024   TB Screening   Were you born outside of the [place]? No            Today's PHQ-2 Score:       8/2/2024    10:53 AM   PHQ-2 ( 1999 Pfizer)   Q1: Little interest or pleasure in doing things 0   Q2: Feeling down, depressed or hopeless 1   PHQ-2 Score 1   Q1: Little interest or pleasure in doing things Not at all   Q2: Feeling down, depressed or hopeless Several days   PHQ-2 Score 1           7/29/2024   Substance Use   Alcohol more than 3/day or more than 7/wk No   Do you have a current opioid prescription? No   How severe/bad is pain from 1 to 10? 5/10   Do you use any other substances recreationally? (!) OTHER        Social History     Tobacco Use    Smoking status:  Former     Current packs/day: 0.00     Average packs/day: 1 pack/day for 20.0 years (20.0 ttl pk-yrs)     Types: Cigarettes     Start date: 1965     Quit date: 1985     Years since quittin.3    Smokeless tobacco: Never    Tobacco comments:     spouse smokes outside   Vaping Use    Vaping status: Never Used   Substance Use Topics    Alcohol use: Yes     Comment: Occasional    Drug use: Never     ASCVD Risk   The ASCVD Risk score (Michelle VALDES, et al., 2019) failed to calculate for the following reasons:    The patient has a prior MI or stroke diagnosis  Reviewed and updated as needed this visit by Provider                    Lab work is in process  Current providers sharing in care for this patient include:  Patient Care Team:  Orville Galeas MD as PCP - General  Orville Galeas MD as Assigned PCP  Zen Ramos MD as Assigned Musculoskeletal Provider    The following health maintenance items are reviewed in Epic and correct as of today:  Health Maintenance   Topic Date Due    DIABETIC FOOT EXAM  Never done    COVID-19 Vaccine (7 - 2023-24 season) 2024    EYE EXAM  2024    A1C  2024    BMP  2024    LIPID  2024    MICROALBUMIN  2024    ANNUAL REVIEW OF HM ORDERS  2024    CREATININE  2024    MEDICARE ANNUAL WELLNESS VISIT  2024    INFLUENZA VACCINE (1) 2024    DTAP/TDAP/TD IMMUNIZATION (2 - Td or Tdap) 2025    FALL RISK ASSESSMENT  2025    COLORECTAL CANCER SCREENING  2026    ADVANCE CARE PLANNING  2028    HEPATITIS C SCREENING  Completed    PHQ-2 (once per calendar year)  Completed    Pneumococcal Vaccine: 65+ Years  Completed    ZOSTER IMMUNIZATION  Completed    RSV VACCINE (Pregnancy & 60+)  Completed    AORTIC ANEURYSM SCREENING (SYSTEM ASSIGNED)  Completed    IPV IMMUNIZATION  Aged Out    HPV IMMUNIZATION  Aged Out    MENINGITIS IMMUNIZATION  Aged Out    RSV MONOCLONAL ANTIBODY  Aged Out         Review  "of Systems  CONSTITUTIONAL: NEGATIVE for fever, chills, change in weight  INTEGUMENTARY/SKIN: NEGATIVE for worrisome rashes, moles or lesions  EYES: NEGATIVE for vision changes or irritation  ENT/MOUTH: NEGATIVE for ear, mouth and throat problems  RESP: NEGATIVE for significant cough or SOB  BREAST: NEGATIVE for masses, tenderness or discharge  CV: NEGATIVE for chest pain, palpitations or peripheral edema  GI: NEGATIVE for nausea, abdominal pain, heartburn, or change in bowel habits  : NEGATIVE for frequency, dysuria, or hematuria  MUSCULOSKELETAL: NEGATIVE for significant arthralgias or myalgia  NEURO: chronic balance issues and falling frequently   ENDOCRINE: NEGATIVE for temperature intolerance, skin/hair changes  HEME: NEGATIVE for bleeding problems  PSYCHIATRIC: NEGATIVE for changes in mood or affect     Objective    Exam  /70 (BP Location: Left arm, Patient Position: Chair)   Pulse 64   Temp 97.3  F (36.3  C) (Temporal)   Resp 16   Ht 1.784 m (5' 10.25\")   Wt 90.3 kg (199 lb 1.6 oz)   SpO2 97%   BMI 28.36 kg/m     Estimated body mass index is 28.36 kg/m  as calculated from the following:    Height as of this encounter: 1.784 m (5' 10.25\").    Weight as of this encounter: 90.3 kg (199 lb 1.6 oz).    Physical Exam  GENERAL: alert and no distress  EYES: Eyes grossly normal to inspection, PERRL and conjunctivae and sclerae normal  HENT: ear canals and TM's normal, nose and mouth without ulcers or lesions  NECK: no adenopathy, no asymmetry, masses, or scars  RESP: lungs clear to auscultation - no rales, rhonchi or wheezes  CV: regular rate and rhythm, normal S1 S2, no S3 or S4, no murmur, click or rub, no peripheral edema  ABDOMEN: soft, nontender, no hepatosplenomegaly, no masses and bowel sounds normal  MS: no gross musculoskeletal defects noted, no edema  SKIN: no suspicious lesions or rashes  PSYCH: mentation appears normal, affect normal/bright        8/2/2024   Mini Cog   Clock Draw Score 2 " Normal   3 Item Recall 3 objects recalled   Mini Cog Total Score 5           Signed Electronically by: Orville Galeas MD

## 2024-08-03 ENCOUNTER — HEALTH MAINTENANCE LETTER (OUTPATIENT)
Age: 74
End: 2024-08-03

## 2024-08-15 ENCOUNTER — THERAPY VISIT (OUTPATIENT)
Dept: PHYSICAL THERAPY | Facility: CLINIC | Age: 74
End: 2024-08-15
Attending: PHYSICIAN ASSISTANT
Payer: COMMERCIAL

## 2024-08-15 DIAGNOSIS — R29.6 RECURRENT FALLS: Primary | ICD-10-CM

## 2024-08-15 PROCEDURE — 97110 THERAPEUTIC EXERCISES: CPT | Mod: GP | Performed by: PHYSICAL THERAPIST

## 2024-08-15 PROCEDURE — 97530 THERAPEUTIC ACTIVITIES: CPT | Mod: GP | Performed by: PHYSICAL THERAPIST

## 2024-08-30 ENCOUNTER — LAB (OUTPATIENT)
Dept: LAB | Facility: OTHER | Age: 74
End: 2024-08-30
Payer: COMMERCIAL

## 2024-08-30 DIAGNOSIS — Z12.5 SCREENING FOR PROSTATE CANCER: ICD-10-CM

## 2024-08-30 DIAGNOSIS — I10 ESSENTIAL HYPERTENSION WITH GOAL BLOOD PRESSURE LESS THAN 130/80: ICD-10-CM

## 2024-08-30 DIAGNOSIS — E11.9 TYPE 2 DIABETES MELLITUS WITHOUT COMPLICATION, WITHOUT LONG-TERM CURRENT USE OF INSULIN (H): ICD-10-CM

## 2024-08-30 LAB
ALBUMIN SERPL BCG-MCNC: 4 G/DL (ref 3.5–5.2)
ALP SERPL-CCNC: 99 U/L (ref 40–150)
ALT SERPL W P-5'-P-CCNC: 26 U/L (ref 0–70)
ANION GAP SERPL CALCULATED.3IONS-SCNC: 7 MMOL/L (ref 7–15)
AST SERPL W P-5'-P-CCNC: 24 U/L (ref 0–45)
BILIRUB SERPL-MCNC: 0.8 MG/DL
BUN SERPL-MCNC: 18.7 MG/DL (ref 8–23)
CALCIUM SERPL-MCNC: 9.1 MG/DL (ref 8.8–10.4)
CHLORIDE SERPL-SCNC: 101 MMOL/L (ref 98–107)
CHOLEST SERPL-MCNC: 131 MG/DL
CREAT SERPL-MCNC: 0.87 MG/DL (ref 0.67–1.17)
CREAT UR-MCNC: 166.3 MG/DL
EGFRCR SERPLBLD CKD-EPI 2021: 90 ML/MIN/1.73M2
FASTING STATUS PATIENT QL REPORTED: YES
FASTING STATUS PATIENT QL REPORTED: YES
GLUCOSE SERPL-MCNC: 136 MG/DL (ref 70–99)
HBA1C MFR BLD: 6.1 % (ref 0–5.6)
HCO3 SERPL-SCNC: 34 MMOL/L (ref 22–29)
HDLC SERPL-MCNC: 39 MG/DL
LDLC SERPL CALC-MCNC: 66 MG/DL
MICROALBUMIN UR-MCNC: <12 MG/L
MICROALBUMIN/CREAT UR: NORMAL MG/G{CREAT}
NONHDLC SERPL-MCNC: 92 MG/DL
POTASSIUM SERPL-SCNC: 4.2 MMOL/L (ref 3.4–5.3)
PROT SERPL-MCNC: 7.1 G/DL (ref 6.4–8.3)
PSA SERPL DL<=0.01 NG/ML-MCNC: 1.28 NG/ML (ref 0–6.5)
SODIUM SERPL-SCNC: 142 MMOL/L (ref 135–145)
TRIGL SERPL-MCNC: 128 MG/DL

## 2024-08-30 PROCEDURE — G0103 PSA SCREENING: HCPCS

## 2024-08-30 PROCEDURE — 82570 ASSAY OF URINE CREATININE: CPT

## 2024-08-30 PROCEDURE — 80053 COMPREHEN METABOLIC PANEL: CPT

## 2024-08-30 PROCEDURE — 82043 UR ALBUMIN QUANTITATIVE: CPT

## 2024-08-30 PROCEDURE — 80061 LIPID PANEL: CPT

## 2024-08-30 PROCEDURE — 83036 HEMOGLOBIN GLYCOSYLATED A1C: CPT

## 2024-08-30 PROCEDURE — 36415 COLL VENOUS BLD VENIPUNCTURE: CPT

## 2024-09-18 ENCOUNTER — THERAPY VISIT (OUTPATIENT)
Dept: PHYSICAL THERAPY | Facility: CLINIC | Age: 74
End: 2024-09-18
Attending: PHYSICIAN ASSISTANT
Payer: COMMERCIAL

## 2024-09-18 ENCOUNTER — TELEPHONE (OUTPATIENT)
Dept: INTERNAL MEDICINE | Facility: CLINIC | Age: 74
End: 2024-09-18
Payer: COMMERCIAL

## 2024-09-18 DIAGNOSIS — R29.6 RECURRENT FALLS: Primary | ICD-10-CM

## 2024-09-18 DIAGNOSIS — L08.9 LOCAL INFECTION OF SKIN AND SUBCUTANEOUS TISSUE: Primary | ICD-10-CM

## 2024-09-18 PROCEDURE — 97112 NEUROMUSCULAR REEDUCATION: CPT | Mod: GP | Performed by: PHYSICAL THERAPIST

## 2024-09-18 PROCEDURE — 97750 PHYSICAL PERFORMANCE TEST: CPT | Mod: GP | Performed by: PHYSICAL THERAPIST

## 2024-09-18 RX ORDER — MUPIROCIN 20 MG/G
OINTMENT TOPICAL 3 TIMES DAILY
Qty: 30 G | Refills: 0 | Status: SHIPPED | OUTPATIENT
Start: 2024-09-18 | End: 2024-09-19

## 2024-09-18 NOTE — TELEPHONE ENCOUNTER
Pt was recently seen at ER and was prescribed Mupirocin ointment, he is wondering if he can get a refill? Directions were apply to skin twice daily.    Thank You~  Mary Kruger CPhT  [facility]  [place]

## 2024-09-18 NOTE — PROGRESS NOTES
08/15/24 0500   Appointment Info   Signing clinician's name / credentials Delphine Fields, PT   Total/Authorized Visits 8/10   Visits Used 8   PT Tx Diagnosis Repeated falls, impaired balance and mobility   Progress Note/Certification   Start of Care Date 05/23/24   Onset of illness/injury or Date of Surgery 05/17/24   Therapy Frequency 4 sessions every other week   Predicted Duration 8 weeks   Certification date from 07/11/24   Certification date to 09/05/24   Progress Note Due Date 09/05/24   Progress Note Completed Date 07/11/24       Present No   GOALS   PT Goals 2;3   PT Goal 1   Goal Identifier HEP   Goal Description Pt will demo independence in performance and progression of strengthening and balance HEP in order to improve CLOF.   Goal Progress Completing home program,   Target Date   (Ongoing, updates as needed)   PT Goal 2   Goal Identifier FGA   Goal Description Pt will demo improved balance and mobility as shown by increased FGA score of at least 4 points in order to show significant improvement and reduced falls risk.   Goal Progress improved to 22/30 today - still risk of fall   Target Date 09/20/24   PT Goal 3   Goal Identifier Golf   Goal Description Pt will demo improved functional dynamic balance as shown by his ability to swing golf club through partial swing length in order to effectively participate in chipping and putting with his sons.   Goal Progress Able to putt but not drive or chip over weekend. Walked golf course without cane or walking stick.   Target Date 07/18/24   Subjective Report   Subjective Report Total gym and might have over worked. Continuing treadmill. Balance: varies day to day. Affect by sleep, sicne CVA. Reports being outside and bending over to  sticks without telling wife, wife found out and readdressed the issue.   Objective Measures   Objective Measures Objective Measure 2;Objective Measure 3   Objective Measure 1   Objective Measure FGA    Details 22/30   Objective Measure 2   Objective Measure antigravity MRROM (L/R)   Details not tested today   Objective Measure 3   Objective Measure Tandem stand:   Details continue at home or complete a lunge that is safe for him and with support, practice head turns progressing to walking with someone with him for safety   Treatment Interventions (PT)   Interventions Intervention (Other);Therapeutic Activity   Therapeutic Procedure/Exercise   Therapeutic Procedures: strength, endurance, ROM, flexibility minutes (07337) 15   Ther Proc 1 - Details reviewed home exercises - limit shoulder extension to neutral until better motion, tandem lunge position with support and head turns ->walking head turns with someone for support/safety, slower progression of exercises - high rep low weight to start   Skilled Intervention instruction and recommendations, demonstration   Patient Response/Progress no questions.   Therapeutic Activity   Therapeutic Activities: dynamic activities to improve functional performance minutes (71156) 23   Ther Act 1 - Details Completed FGA with pt informed about outcome and need to use support when outside, let wife know when out and active.   Skilled Intervention instruction, monitoring, completing test   Patient Response/Progress no questions.   Education   Learner/Method Patient;Listening;Demonstration;Pictures/Video;No Barriers to Learning;Reading   Education Comments lunge position with head turns vs tandem stand, FGA outcome., plan of care   Plan   Home program head turns with hip abduction and extension, walk and complete head turns with someone for safety, consider total gym 20-30 minutes x 3-5 days per week,   Updates to plan of care will work on home program and schedule 1-2 appts over the next2 weeks   Plan for next session continue to work on higher level balance   Comments   Comments He feels vision is creating his off balance issues with head turns during walking. IMprovement in FGA  from 20->22 (see separate report).  Plan to continue monthly for advanced activities and recommnedations.   Total Session Time   Timed Code Treatment Minutes 38   Total Treatment Time (sum of timed and untimed services) 38         [facility]                                                                                   OUTPATIENT PHYSICAL THERAPY    PLAN OF TREATMENT FOR OUTPATIENT REHABILITATION   Patient's Last Name, First Name, Dylon Pollack YOB: 1950   Provider's Name   [facility]   Medical Record No.  [id number]     Onset Date: 05/17/24  Start of Care Date: 05/23/24     Medical Diagnosis:     Recurrent falls [R29.6]  - Primary       PT Treatment Diagnosis:  Repeated falls, impaired balance and mobility Plan of Treatment  Frequency/Duration: 1 time every month/2 months    Certification date from 8- to   10-       See note for plan of treatment details and functional goals     Delphine Fields, PT                         I CERTIFY THE NEED FOR THESE SERVICES FURNISHED UNDER        THIS PLAN OF TREATMENT AND WHILE UNDER MY CARE .             Physician Signature               Date    X_____________________________________________________                  Referring Provider:  Aliya AGUERO CNP    Initial Assessment  See Epic Evaluation- Start of Care Date: 05/23/24

## 2024-09-19 DIAGNOSIS — L08.9 LOCAL INFECTION OF SKIN AND SUBCUTANEOUS TISSUE: ICD-10-CM

## 2024-09-19 RX ORDER — MUPIROCIN 20 MG/G
OINTMENT TOPICAL 3 TIMES DAILY
Qty: 30 G | Refills: 0 | Status: SHIPPED | OUTPATIENT
Start: 2024-09-19

## 2024-09-19 NOTE — PROGRESS NOTES
09/18/24 0500   Appointment Info   Signing clinician's name / credentials Delphine Fields PT   Total/Authorized Visits 9/10   Visits Used 9   Medical Diagnosis Recurrent falls (R29.6)  - Primary   PT Tx Diagnosis Repeated falls, impaired balance and mobility   Progress Note/Certification   Start of Care Date 05/23/24   Onset of illness/injury or Date of Surgery 05/17/24   Therapy Frequency 1 time monthly x 2   Predicted Duration 60 days   Certification date from 08/15/24   Certification date to 10/15/24   Progress Note Completed Date 07/11/24       Present No   GOALS   PT Goals 2;3   PT Goal 1   Goal Identifier HEP   Goal Description Pt will demo independence in performance and progression of strengthening and balance HEP in order to improve CLOF.   Goal Progress Completing home program,   Target Date   (Ongoing, updates as needed)   PT Goal 2   Goal Identifier FGA   Goal Description Pt will demo improved balance and mobility as shown by increased FGA score of at least 4 points in order to show significant improvement and reduced falls risk.   Goal Progress improved to 22/30 today - still risk of fall   Target Date 09/20/24   PT Goal 3   Goal Identifier Golf   Goal Description Pt will demo improved functional dynamic balance as shown by his ability to swing golf club through partial swing length in order to effectively participate in chipping and putting with his sons.   Goal Progress Able to putt but not drive or chip over weekend. Walked golf course without cane or walking stick.   Target Date 07/18/24   Subjective Report   Subjective Report fell and sustained abrasions bilateral knees with R more than L. no fall other than this one time since last seen. Had a new med and this might have been cause of the fall - primadone. He was out of it and tired with the new meds and balance was off. feels pain is improving. Has enough exercises to day. Using total gym and treadmill at home. (.6 miles  TM) Good track and some improvement with the exercises until falling. Wife present for entire session today.   Objective Measures   Objective Measures Objective Measure 2;Objective Measure 3   Objective Measure 1   Objective Measure FGA   Details 22/30   Objective Measure 3   Objective Measure Tandem stand:   Details continues to be difficult. able to take 3-4 steps with walking and difficulty placing feet.   Treatment Interventions (PT)   Interventions Neuromuscular Re-education   Neuromuscular Re-education   Neuromuscular re-ed of mvmt, balance, coord, kinesthetic sense, posture, proprioception minutes (57431) 17   Neuro Re-ed 1 - Details Continue with current exercises once R knee improves. Asked to continue wt total gym. Strongly recommended 4 WW when in community especially with longer walks, use cane in home vs wall and other furniture, continue with walking stick for safety.  Discussed may not get full balance back and wife present and agreed with walker/cane/walking stick information. discussed even if progress plateaus, he should consistently continue with his home program.   Skilled Intervention education/recommendations   Patient Response/Progress Wife agrees as does pt. not sure he wants to use cane or rollater/4WW in public.   Neuro Re-ed 1 home   Eval/Assessments   Assessments Physical Performance Test/Measures   Physical Performance Test/measures   Physical Performance Test/Measurement, Minutes (04449) 23   Physical Performance Test/Measurement Details Functional Gait Assessment: see separate report   Skilled Intervention administering, monitoring, interpretation, review of results with Tyler and his wife.   Patient Response/Progress no overall change since last test. He did have slightly different scores. Please note he was still sore from the fall he took late AUgust.   Progress Understood the score and both felt he was progressing  overall prior to the fall.   Education   Learner/Method  Patient;Significant Other;Listening;No Barriers to Learning   Education Comments plan of care   Plan   Home program head turns with hip abduction and extension, walk and complete head turns with someone for safety, consider total gym 20-30 minutes x 3-5 days per week,   Updates to plan of care discharge   Comments   Comments After completing the FGA today, we discussed progress before he last fell at the end of AUgust. His wife felt it was more from new medications that made him shake. HE is unsure of the cause. Overall he had improvement with the FGA since initial evaluation. He had no overall change in FGA as he is still sore from fall. He is independent with his home program and wife reports he was beginning to get a better routine and feeling stronger. We agreed he would continue with his home program working slowly into previous exercises and PT chart would be closed.   Total Session Time   Timed Code Treatment Minutes 40   Total Treatment Time (sum of timed and untimed services) 40         DISCHARGE  Reason for Discharge: has made progress with balance and strength, endurance. Does not feel he needs any more exercises and is working into an exercise routine.     Equipment Issued: Band    Discharge Plan: Patient to continue home program.    Referring Provider:  Aliya AGUERO CNP

## 2024-09-26 ENCOUNTER — TRANSFERRED RECORDS (OUTPATIENT)
Dept: MULTI SPECIALTY CLINIC | Facility: CLINIC | Age: 74
End: 2024-09-26

## 2024-09-26 LAB — RETINOPATHY: NORMAL

## 2024-10-09 ENCOUNTER — TELEPHONE (OUTPATIENT)
Dept: INTERNAL MEDICINE | Facility: CLINIC | Age: 74
End: 2024-10-09
Payer: COMMERCIAL

## 2024-10-31 ENCOUNTER — MYC MEDICAL ADVICE (OUTPATIENT)
Dept: INTERNAL MEDICINE | Facility: CLINIC | Age: 74
End: 2024-10-31
Payer: COMMERCIAL

## 2024-10-31 DIAGNOSIS — J06.9 UPPER RESPIRATORY TRACT INFECTION, UNSPECIFIED TYPE: Primary | ICD-10-CM

## 2024-10-31 NOTE — TELEPHONE ENCOUNTER
Patient states he only has 1/2 an airway from a stroke.    He states he is coughing up green discharge.  He states it gets worse the longer he waits.    This has been going on for a week.  He states he is occasionally short of breath at rest. If he can cough it out he feels better, but he can not always cough things out.    He states he use to get 1-2 yearly.    Patient would like a prescription of Zithromax and prednisone.    Vira Cardenas RN on 10/31/2024 at 3:11 PM

## 2024-11-01 RX ORDER — PREDNISONE 20 MG/1
40 TABLET ORAL DAILY
Qty: 14 TABLET | Refills: 0 | Status: SHIPPED | OUTPATIENT
Start: 2024-11-01

## 2024-11-01 RX ORDER — AZITHROMYCIN 250 MG/1
TABLET, FILM COATED ORAL
Qty: 6 TABLET | Refills: 0 | Status: SHIPPED | OUTPATIENT
Start: 2024-11-01

## 2024-12-13 DIAGNOSIS — R21 RASH AND NONSPECIFIC SKIN ERUPTION: ICD-10-CM

## 2024-12-13 NOTE — TELEPHONE ENCOUNTER
Clinic RN: Please investigate patient's chart or contact patient if the information cannot be found because patient should have run out of this medication on 9/2021. Confirm patient is taking this medication as prescribed. Document findings and route refill encounter to provider for approval or denial.    Emir Hollingsworth RN on 12/13/2024 at 3:11 PM

## 2024-12-16 RX ORDER — NYSTATIN 100000 U/G
CREAM TOPICAL 2 TIMES DAILY
Qty: 60 G | Refills: 1 | Status: SHIPPED | OUTPATIENT
Start: 2024-12-16

## 2024-12-16 NOTE — TELEPHONE ENCOUNTER
Pt says he doesn't use very often but still does use.   Roxy Chow, Pharmacy Tech, [facility] [phone]

## 2024-12-21 ENCOUNTER — HEALTH MAINTENANCE LETTER (OUTPATIENT)
Age: 74
End: 2024-12-21

## 2025-02-24 ENCOUNTER — TRANSFERRED RECORDS (OUTPATIENT)
Dept: HEALTH INFORMATION MANAGEMENT | Facility: CLINIC | Age: 75
End: 2025-02-24
Payer: COMMERCIAL

## 2025-03-22 ENCOUNTER — HEALTH MAINTENANCE LETTER (OUTPATIENT)
Age: 75
End: 2025-03-22

## 2025-06-02 DIAGNOSIS — I10 ESSENTIAL HYPERTENSION WITH GOAL BLOOD PRESSURE LESS THAN 130/80: ICD-10-CM

## 2025-06-02 RX ORDER — INDAPAMIDE 1.25 MG/1
1.25 TABLET ORAL EVERY MORNING
Qty: 90 TABLET | Refills: 3 | OUTPATIENT
Start: 2025-06-02

## 2025-07-05 ENCOUNTER — HEALTH MAINTENANCE LETTER (OUTPATIENT)
Age: 75
End: 2025-07-05

## 2025-07-16 ENCOUNTER — TELEPHONE (OUTPATIENT)
Dept: INTERNAL MEDICINE | Facility: CLINIC | Age: 75
End: 2025-07-16
Payer: COMMERCIAL

## 2025-07-16 NOTE — TELEPHONE ENCOUNTER
Reason for Call:  Appointment Request    Patient requesting this type of appt: Pre-op    Requested provider: Orville Galeas    Reason patient unable to be scheduled: Not within requested timeframe    When does patient want to be seen/preferred time: needs an appointment before 7/31/25    Pre Op, 7/31/25, vocal keyanna/Dr. Jb clarke [place]  (will be needing an EKG also).     Patient stated that his wife has an appointment scheduled for 7/23/25@ 9:30 am with PCP and was wondering if he can be added to the appointment.     Comments: Please contact patient if that would be possible or if he'll need his own appointment.     Could we send this information to you in eMagin or would you prefer to receive a phone call?:   Patient would like to be contacted via eMagin      Call taken on 7/16/2025 at 4:18 PM by Christine Nazario

## 2025-07-23 ENCOUNTER — OFFICE VISIT (OUTPATIENT)
Dept: INTERNAL MEDICINE | Facility: CLINIC | Age: 75
End: 2025-07-23
Payer: COMMERCIAL

## 2025-07-23 VITALS
DIASTOLIC BLOOD PRESSURE: 65 MMHG | TEMPERATURE: 96.9 F | OXYGEN SATURATION: 95 % | BODY MASS INDEX: 28.98 KG/M2 | HEIGHT: 70 IN | RESPIRATION RATE: 14 BRPM | WEIGHT: 202.4 LBS | HEART RATE: 72 BPM | SYSTOLIC BLOOD PRESSURE: 110 MMHG

## 2025-07-23 DIAGNOSIS — I10 ESSENTIAL HYPERTENSION WITH GOAL BLOOD PRESSURE LESS THAN 130/80: ICD-10-CM

## 2025-07-23 DIAGNOSIS — E11.9 TYPE 2 DIABETES MELLITUS WITHOUT COMPLICATION, WITHOUT LONG-TERM CURRENT USE OF INSULIN (H): ICD-10-CM

## 2025-07-23 DIAGNOSIS — Z01.818 PRE-OP EXAM: Primary | ICD-10-CM

## 2025-07-23 LAB
ALBUMIN SERPL BCG-MCNC: 4.2 G/DL (ref 3.5–5.2)
ALP SERPL-CCNC: 112 U/L (ref 40–150)
ALT SERPL W P-5'-P-CCNC: 21 U/L (ref 0–70)
ANION GAP SERPL CALCULATED.3IONS-SCNC: 7 MMOL/L (ref 7–15)
AST SERPL W P-5'-P-CCNC: 21 U/L (ref 0–45)
BILIRUB SERPL-MCNC: 0.8 MG/DL
BUN SERPL-MCNC: 16.1 MG/DL (ref 8–23)
CALCIUM SERPL-MCNC: 9.6 MG/DL (ref 8.8–10.4)
CHLORIDE SERPL-SCNC: 101 MMOL/L (ref 98–107)
CREAT SERPL-MCNC: 0.85 MG/DL (ref 0.67–1.17)
EGFRCR SERPLBLD CKD-EPI 2021: 90 ML/MIN/1.73M2
ERYTHROCYTE [DISTWIDTH] IN BLOOD BY AUTOMATED COUNT: 14.5 % (ref 10–15)
EST. AVERAGE GLUCOSE BLD GHB EST-MCNC: 137 MG/DL
GLUCOSE SERPL-MCNC: 143 MG/DL (ref 70–99)
HBA1C MFR BLD: 6.4 %
HCO3 SERPL-SCNC: 32 MMOL/L (ref 22–29)
HCT VFR BLD AUTO: 40.4 % (ref 40–53)
HGB BLD-MCNC: 13.7 G/DL (ref 13.3–17.7)
MCH RBC QN AUTO: 29.5 PG (ref 26.5–33)
MCHC RBC AUTO-ENTMCNC: 33.9 G/DL (ref 31.5–36.5)
MCV RBC AUTO: 87 FL (ref 78–100)
PLATELET # BLD AUTO: 117 10E3/UL (ref 150–450)
POTASSIUM SERPL-SCNC: 4.7 MMOL/L (ref 3.4–5.3)
PROT SERPL-MCNC: 7.5 G/DL (ref 6.4–8.3)
RBC # BLD AUTO: 4.65 10E6/UL (ref 4.4–5.9)
SODIUM SERPL-SCNC: 140 MMOL/L (ref 135–145)
WBC # BLD AUTO: 6.4 10E3/UL (ref 4–11)

## 2025-07-23 PROCEDURE — 85027 COMPLETE CBC AUTOMATED: CPT | Performed by: INTERNAL MEDICINE

## 2025-07-23 PROCEDURE — 3044F HG A1C LEVEL LT 7.0%: CPT | Performed by: INTERNAL MEDICINE

## 2025-07-23 PROCEDURE — 36415 COLL VENOUS BLD VENIPUNCTURE: CPT | Performed by: INTERNAL MEDICINE

## 2025-07-23 PROCEDURE — 93000 ELECTROCARDIOGRAM COMPLETE: CPT | Performed by: INTERNAL MEDICINE

## 2025-07-23 PROCEDURE — 1126F AMNT PAIN NOTED NONE PRSNT: CPT | Performed by: INTERNAL MEDICINE

## 2025-07-23 PROCEDURE — G2211 COMPLEX E/M VISIT ADD ON: HCPCS | Performed by: INTERNAL MEDICINE

## 2025-07-23 PROCEDURE — 3074F SYST BP LT 130 MM HG: CPT | Performed by: INTERNAL MEDICINE

## 2025-07-23 PROCEDURE — 3078F DIAST BP <80 MM HG: CPT | Performed by: INTERNAL MEDICINE

## 2025-07-23 PROCEDURE — 80053 COMPREHEN METABOLIC PANEL: CPT | Performed by: INTERNAL MEDICINE

## 2025-07-23 PROCEDURE — 99214 OFFICE O/P EST MOD 30 MIN: CPT | Performed by: INTERNAL MEDICINE

## 2025-07-23 PROCEDURE — 83036 HEMOGLOBIN GLYCOSYLATED A1C: CPT | Performed by: INTERNAL MEDICINE

## 2025-07-23 ASSESSMENT — PAIN SCALES - GENERAL: PAINLEVEL_OUTOF10: NO PAIN (0)

## 2025-07-23 NOTE — PROGRESS NOTES
Preoperative Evaluation  [address] [address]  Phone: [phone]  Primary Provider: Orville Galeas MD  Pre-op Performing Provider: Orville Galeas MD  Jul 23, 2025 7/18/2025   Surgical Information   What procedure is being done? Vocal cord injection and possibly injection orcutting one of the muscles involved in swallowing   Facility or Hospital where procedure/surgery will be performed: [facility]   Who is doing the procedure / surgery? Dr Willett   Date of surgery / procedure: 7/31/25   Time of surgery / procedure: TBD   Where do you plan to recover after surgery? at home with family     Fax number for surgical facility: [phone]     Assessment & Plan     The proposed surgical procedure is considered INTERMEDIATE risk.    Problem List Items Addressed This Visit       Essential hypertension with goal blood pressure less than 130/80 (Chronic)    Diabetes mellitus, type 2 (H)    Relevant Orders    HEMOGLOBIN A1C     Other Visit Diagnoses         Pre-op exam    -  Primary    Relevant Orders    EKG 12-lead complete w/read - Clinics (Completed)    CBC with platelets    Comprehensive metabolic panel (BMP + Alb, Alk Phos, ALT, AST, Total. Bili, TP)          Ok for surgery          Risks and Recommendations  The patient has the following additional risks and recommendations for perioperative complications:  Obstructive Sleep Apnea:   Previous stroke in 1999   -     Antiplatelet or Anticoagulation Medication Instructions   - aspirin: Discontinue aspirin 7 days prior to procedure to reduce bleeding risk. It should be resumed postoperatively.     Additional Medication Instructions   - Beta Blockers (atenolol, metoprolol, propranolol) : Continue taking on the day of surgery.   - Diuretics (furosemide, hydrochlorothiazide, chlorothalidone): DO NOT TAKE on the day of surgery.    Recommendation  Approval given to proceed with proposed  procedure, without further diagnostic evaluation.    The longitudinal plan of care for the diagnosis(es)/condition(s) as documented were addressed during this visit. Due to the added complexity in care, I will continue to support Tyler in the subsequent management and with ongoing continuity of care.    Subjective   Tyler is a 75 year old, presenting for the following:  Pre-Op Exam          7/23/2025    10:02 AM   Additional Questions   Roomed by Tony ELAM: ENT at [facility] will do injection and possible clipping or dilatation would be helpful.            7/18/2025   Pre-Op Questionnaire   Have you ever had a heart attack or stroke? (!) YES brainstem stroke in 1999   Have you ever had surgery on your heart or blood vessels, such as a stent placement, a coronary artery bypass, or surgery on an artery in your head, neck, heart, or legs? No   Do you have chest pain with activity? No   Do you have a history of heart failure? No   Do you currently have a cold, bronchitis or symptoms of other infection? No   Do you have a cough, shortness of breath, or wheezing? No   Do you or anyone in your family have previous history of blood clots? (!) UNKNOWN   Do you or does anyone in your family have a serious bleeding problem such as prolonged bleeding following surgeries or cuts? No   Have you ever had problems with anemia or been told to take iron pills? No   Have you had any abnormal blood loss such as black, tarry or bloody stools? No   Have you ever had a blood transfusion? No   Are you willing to have a blood transfusion if it is medically needed before, during, or after your surgery? Yes   Have you or any of your relatives ever had problems with anesthesia? (!) UNKNOWN    Do you have sleep apnea, excessive snoring or daytime drowsiness? (!) YES   Do you have a CPAP machine? Yes   Do you have any artifical heart valves or other implanted medical devices like a pacemaker, defibrillator, or continuous glucose monitor? No   Do you  have artificial joints? No   Are you allergic to latex? No     Advance Care Planning    Discussed advance care planning with patient; informed AVS has link to [facility] [employer].    Preoperative Review of    reviewed - no record of controlled substances prescribed. Only clonazepam daily     Status of Chronic Conditions:  HYPERLIPIDEMIA - Patient has a long history of significant Hyperlipidemia requiring medication for treatment with recent good control. Patient reports no problems or side effects with the medication.     HYPERTENSION - Patient has longstanding history of HTN , currently denies any symptoms referable to elevated blood pressure. Specifically denies chest pain, palpitations, dyspnea, orthopnea, PND or peripheral edema. Blood pressure readings have been in normal range. Current medication regimen is as listed below. Patient denies any side effects of medication.     Diet controlled diabetes A1c pending.     Patient Active Problem List    Diagnosis Date Noted    Diabetes mellitus, type 2 (H) 07/24/2023     Priority: Medium    Primary osteoarthritis of right knee 05/19/2022     Priority: Medium    Other sequelae following unspecified cerebrovascular disease 08/04/2021     Priority: Medium    Essential hypertension with goal blood pressure less than 130/80 08/30/2016     Priority: Medium    Adjustment disorder with depressed mood 12/22/2015     Priority: Medium    Acute bronchitis 07/09/2015     Priority: Medium    TED (obstructive sleep apnea) 06/25/2013     Priority: Medium    HYPERLIPIDEMIA LDL GOAL <130 10/31/2010     Priority: Medium    GERD (gastroesophageal reflux disease) 09/25/2008     Priority: Medium    Acute, but ill-defined, cerebrovascular disease      Priority: Medium     right brainstem        Past Medical History:   Diagnosis Date    Acute, but ill-defined, cerebrovascular disease 07/1999    right brainstem with right hemiparesis and dysphagia left eye    Complication of anesthesia      half an airway due to stroke hx    Depressive disorder     Given meds for but think I'm ok    Diabetes mellitus, type 2 (H) 7/24/2023    Dysphagia     Edema     Essential hypertension, benign     Mild intermittent asthma     Mixed hyperlipidemia     Other psoriasis     Personal history of unspecified circulatory disease     Primary osteoarthritis of right knee 05/19/2022    Sleep apnea     CPAP    Unspecified cerebral artery occlusion with cerebral infarction 07/01/1999    Ventral hernia, unspecified, without mention of obstruction or gangrene      Past Surgical History:   Procedure Laterality Date    ABDOMEN SURGERY  2000    Hernia    COLONOSCOPY  05/22/2006    COLONOSCOPY N/A 12/2/2016    Procedure: COLONOSCOPY;  Surgeon: Mj Mcfarland MD;  Location:  [facility]    HERNIA REPAIR  2002    REPAIR PTOSIS  10/18/2013    Procedure: REPAIR PTOSIS;  LEFT UPPER LID PTOSIS REPAIR;  Surgeon: Bienvenido Alejandra MD;  Location: [facility]    REPAIR PTOSIS BILATERAL  8/23/2013    Procedure: REPAIR PTOSIS BILATERAL;  BILATERAL UPPER LID PTOSIS AND MECHANICAL PTOSIS REPAIR ;  Surgeon: Bienvenido Alejandra MD;  Location:  [facility]    TONSILLECTOMY & ADENOIDECTOMY      ZZC APPENDECTOMY      Z NONSPECIFIC PROCEDURE      previous PEG from CVA    Z NONSPECIFIC PROCEDURE      incisional hernia repair from PEG site     Current Outpatient Medications   Medication Sig Dispense Refill    albuterol (PROAIR HFA/PROVENTIL HFA/VENTOLIN HFA) 108 (90 Base) MCG/ACT inhaler Inhale 2 puffs into the lungs every 6 hours as needed for shortness of breath, wheezing or cough. 18 g 0    albuterol (VENTOLIN HFA) 108 (90 Base) MCG/ACT inhaler INHALE 2 PUFFS BY MOUTH EVERY 6 HOURS AS NEEDED FOR SHORTNESS OF BREATH /DYSPNEA 18 g 2    aspirin 81 MG EC tablet Take 162 mg by mouth daily      atorvastatin (LIPITOR) 40 MG tablet Take 1 tablet (40 mg) by mouth daily 90 tablet 3    azithromycin (ZITHROMAX) 250 MG tablet Two tablets first day, then one tablet daily  for four days. 6 tablet 0    baclofen (LIORESAL) 10 MG tablet Take 20 mg by mouth 3 times daily      clonazePAM (KLONOPIN) 0.5 MG tablet Take 1 tablet (0.5 mg) by mouth 2 times daily 12 tablet 0    indapamide (LOZOL) 1.25 MG tablet Take 1 tablet (1.25 mg) by mouth every morning 90 tablet 3    mupirocin (BACTROBAN) 2 % external ointment Apply topically 3 times daily. 30 g 0    nystatin (MYCOSTATIN) 021313 UNIT/GM external cream Apply topically 2 times daily. Profile 60 g 1    order for DME Equipment ordered: RESMED Auto PAP Mask type: Full face  Settings: 6-15 CM H2O      PARoxetine (PAXIL-CR) 25 MG 24 hr tablet TAKE ONE TABLET BY MOUTH EVERY MORNING 90 tablet 4    predniSONE (DELTASONE) 20 MG tablet Take 2 tablets (40 mg) by mouth daily. 14 tablet 0    propranolol ER (INDERAL LA) 60 MG 24 hr capsule Take 2 capsules (120 mg) by mouth daily      triamcinolone (KENALOG) 0.1 % external cream Apply topically 2 times daily 45 g 1    azithromycin (ZITHROMAX) 250 MG tablet Two tablets first day, then one tablet daily for four days. 6 tablet 0    predniSONE (DELTASONE) 20 MG tablet Take 2 tablets (40 mg) by mouth daily. 14 tablet 0       Allergies   Allergen Reactions    Primidone         Social History     Tobacco Use    Smoking status: Former     Current packs/day: 0.00     Average packs/day: 1 pack/day for 20.0 years (20.0 ttl pk-yrs)     Types: Cigarettes     Start date: 1965     Quit date: 1985     Years since quittin.3    Smokeless tobacco: Never    Tobacco comments:     spouse smokes outside   Substance Use Topics    Alcohol use: Yes     Comment: Occasional       History   Drug Use Unknown             Review of Systems  CONSTITUTIONAL: NEGATIVE for fever, chills, change in weight  INTEGUMENTARY/SKIN: NEGATIVE for worrisome rashes, moles or lesions  EYES: NEGATIVE for vision changes or irritation  ENT/MOUTH: NEGATIVE for ear, mouth and throat problems  RESP: NEGATIVE for significant cough or SOB  CV:  "NEGATIVE for chest pain, palpitations or peripheral edema  GI: NEGATIVE for nausea, abdominal pain, heartburn, or change in bowel habits  : NEGATIVE for frequency, dysuria, or hematuria  MUSCULOSKELETAL: NEGATIVE for significant arthralgias or myalgia  NEURO: NEGATIVE for weakness, dizziness or paresthesias  ENDOCRINE: NEGATIVE for temperature intolerance, skin/hair changes  HEME: NEGATIVE for bleeding problems  PSYCHIATRIC: NEGATIVE for changes in mood or affect    Objective    /65 (BP Location: Right arm, Patient Position: Sitting, Cuff Size: Adult Regular)   Pulse 72   Temp 96.9  F (36.1  C) (Temporal)   Resp 14   Ht 1.784 m (5' 10.25\")   Wt 91.8 kg (202 lb 6.4 oz)   SpO2 95%   BMI 28.84 kg/m     Estimated body mass index is 28.84 kg/m  as calculated from the following:    Height as of this encounter: 1.784 m (5' 10.25\").    Weight as of this encounter: 91.8 kg (202 lb 6.4 oz).  Physical Exam  GENERAL: alert and no distress  NECK: no adenopathy, no asymmetry, masses, or scars  RESP: lungs clear to auscultation - no rales, rhonchi or wheezes  CV: regular rate and rhythm, normal S1 S2, no S3 or S4, no murmur, click or rub, no peripheral edema  ABDOMEN: soft, nontender, no hepatosplenomegaly, no masses and bowel sounds normal  MS: no gross musculoskeletal defects noted, no edema    Recent Labs   Lab Test 08/30/24  0846      POTASSIUM 4.2   CR 0.87   A1C 6.1*        Diagnostics  Labs pending at this time.  Results will be reviewed when available.   EKG: appears normal, NSR, normal axis, normal intervals, no acute ST/T changes c/w ischemia, no LVH by voltage criteria, unchanged from previous tracings    Revised Cardiac Risk Index (RCRI)  The patient has the following serious cardiovascular risks for perioperative complications:   - No serious cardiac risks = 0 points     RCRI Interpretation: 1 point: Class II (low risk - 0.9% complication rate)         Signed Electronically by: Orville Galeas, " MD  A copy of this evaluation report is provided to the requesting physician.

## 2025-08-02 DIAGNOSIS — I10 ESSENTIAL HYPERTENSION WITH GOAL BLOOD PRESSURE LESS THAN 130/80: ICD-10-CM

## 2025-08-04 RX ORDER — INDAPAMIDE 1.25 MG/1
1.25 TABLET ORAL EVERY MORNING
Qty: 90 TABLET | Refills: 0 | Status: SHIPPED | OUTPATIENT
Start: 2025-08-04

## 2025-08-12 DIAGNOSIS — E78.5 HYPERLIPIDEMIA LDL GOAL <130: ICD-10-CM

## 2025-08-12 RX ORDER — ATORVASTATIN CALCIUM 40 MG/1
40 TABLET, FILM COATED ORAL DAILY
Qty: 90 TABLET | Refills: 0 | Status: SHIPPED | OUTPATIENT
Start: 2025-08-12

## 2025-08-22 ENCOUNTER — APPOINTMENT (OUTPATIENT)
Dept: ULTRASOUND IMAGING | Facility: CLINIC | Age: 75
End: 2025-08-22
Attending: EMERGENCY MEDICINE
Payer: COMMERCIAL

## 2025-08-22 ENCOUNTER — HOSPITAL ENCOUNTER (EMERGENCY)
Facility: CLINIC | Age: 75
Discharge: HOME | End: 2025-08-22
Attending: EMERGENCY MEDICINE | Admitting: EMERGENCY MEDICINE
Payer: COMMERCIAL

## 2025-08-22 VITALS
RESPIRATION RATE: 18 BRPM | HEART RATE: 62 BPM | DIASTOLIC BLOOD PRESSURE: 63 MMHG | OXYGEN SATURATION: 98 % | SYSTOLIC BLOOD PRESSURE: 132 MMHG | BODY MASS INDEX: 29.35 KG/M2 | WEIGHT: 206 LBS | TEMPERATURE: 96.8 F

## 2025-08-22 DIAGNOSIS — M79.89 LEFT LEG SWELLING: Primary | ICD-10-CM

## 2025-08-22 PROCEDURE — 93971 EXTREMITY STUDY: CPT | Mod: LT

## 2025-08-22 PROCEDURE — 99284 EMERGENCY DEPT VISIT MOD MDM: CPT | Mod: 25 | Performed by: EMERGENCY MEDICINE

## 2025-08-22 PROCEDURE — 99283 EMERGENCY DEPT VISIT LOW MDM: CPT | Performed by: EMERGENCY MEDICINE

## 2025-08-22 ASSESSMENT — COLUMBIA-SUICIDE SEVERITY RATING SCALE - C-SSRS
6. HAVE YOU EVER DONE ANYTHING, STARTED TO DO ANYTHING, OR PREPARED TO DO ANYTHING TO END YOUR LIFE?: NO
1. IN THE PAST MONTH, HAVE YOU WISHED YOU WERE DEAD OR WISHED YOU COULD GO TO SLEEP AND NOT WAKE UP?: NO
2. HAVE YOU ACTUALLY HAD ANY THOUGHTS OF KILLING YOURSELF IN THE PAST MONTH?: NO

## 2025-08-22 ASSESSMENT — ACTIVITIES OF DAILY LIVING (ADL)
ADLS_ACUITY_SCORE: 41
ADLS_ACUITY_SCORE: 41